# Patient Record
Sex: MALE | Race: WHITE | Employment: OTHER | URBAN - METROPOLITAN AREA
[De-identification: names, ages, dates, MRNs, and addresses within clinical notes are randomized per-mention and may not be internally consistent; named-entity substitution may affect disease eponyms.]

---

## 2018-01-01 ENCOUNTER — CLINICAL DOCUMENTATION (OUTPATIENT)
Dept: ONCOLOGY | Age: 73
End: 2018-01-01

## 2018-01-01 ENCOUNTER — APPOINTMENT (OUTPATIENT)
Dept: MRI IMAGING | Age: 73
DRG: 014 | End: 2018-01-01
Attending: SURGERY
Payer: COMMERCIAL

## 2018-01-01 ENCOUNTER — APPOINTMENT (OUTPATIENT)
Dept: INTERVENTIONAL RADIOLOGY/VASCULAR | Age: 73
DRG: 014 | End: 2018-01-01
Attending: SURGERY
Payer: COMMERCIAL

## 2018-01-01 ENCOUNTER — APPOINTMENT (OUTPATIENT)
Dept: GENERAL RADIOLOGY | Age: 73
DRG: 014 | End: 2018-01-01
Attending: SURGERY
Payer: COMMERCIAL

## 2018-01-01 ENCOUNTER — APPOINTMENT (OUTPATIENT)
Dept: CT IMAGING | Age: 73
DRG: 014 | End: 2018-01-01
Attending: SURGERY
Payer: COMMERCIAL

## 2018-01-01 ENCOUNTER — HOSPITAL ENCOUNTER (OUTPATIENT)
Dept: ONCOLOGY | Age: 73
Setting detail: INFUSION SERIES
Discharge: HOME | End: 2018-08-14
Payer: COMMERCIAL

## 2018-01-01 ENCOUNTER — HOSPITAL ENCOUNTER (OUTPATIENT)
Dept: PULMONOLOGY | Age: 73
Discharge: HOME | End: 2018-08-14
Payer: COMMERCIAL

## 2018-01-01 ENCOUNTER — HOSPITAL ENCOUNTER (OUTPATIENT)
Dept: CT IMAGING | Age: 73
Discharge: HOME | End: 2018-08-14
Payer: COMMERCIAL

## 2018-01-01 ENCOUNTER — APPOINTMENT (OUTPATIENT)
Dept: ULTRASOUND IMAGING | Age: 73
DRG: 014 | End: 2018-01-01
Attending: SURGERY
Payer: COMMERCIAL

## 2018-01-01 ENCOUNTER — HOSPITAL ENCOUNTER (OUTPATIENT)
Dept: NUCLEAR MEDICINE | Age: 73
Discharge: HOME | End: 2018-09-12
Payer: COMMERCIAL

## 2018-01-01 ENCOUNTER — TELEPHONE (OUTPATIENT)
Dept: SURGERY | Age: 73
End: 2018-01-01

## 2018-01-01 ENCOUNTER — HOSPITAL ENCOUNTER (OUTPATIENT)
Dept: NON INVASIVE DIAGNOSTICS | Age: 73
Discharge: HOME | End: 2018-08-14
Payer: COMMERCIAL

## 2018-01-01 ENCOUNTER — HOSPITAL ENCOUNTER (OUTPATIENT)
Dept: ONCOLOGY | Age: 73
Setting detail: INFUSION SERIES
Discharge: HOME | End: 2018-09-03
Payer: COMMERCIAL

## 2018-01-01 ENCOUNTER — HOSPITAL ENCOUNTER (INPATIENT)
Age: 73
LOS: 37 days | DRG: 014 | End: 2018-10-27
Attending: SURGERY | Admitting: INTERNAL MEDICINE
Payer: COMMERCIAL

## 2018-01-01 ENCOUNTER — APPOINTMENT (OUTPATIENT)
Dept: NUCLEAR MEDICINE | Age: 73
DRG: 014 | End: 2018-01-01
Attending: SURGERY
Payer: COMMERCIAL

## 2018-01-01 ENCOUNTER — HOSPITAL ENCOUNTER (OUTPATIENT)
Dept: CT IMAGING | Age: 73
Discharge: HOME | End: 2018-09-12
Payer: COMMERCIAL

## 2018-01-01 ENCOUNTER — ANESTHESIA EVENT (OUTPATIENT)
Dept: OPERATING ROOM | Age: 73
DRG: 014 | End: 2018-01-01
Payer: COMMERCIAL

## 2018-01-01 ENCOUNTER — HOSPITAL ENCOUNTER (OUTPATIENT)
Dept: ONCOLOGY | Age: 73
Setting detail: INFUSION SERIES
Discharge: HOME | End: 2018-08-17
Payer: COMMERCIAL

## 2018-01-01 ENCOUNTER — ANESTHESIA (OUTPATIENT)
Dept: OPERATING ROOM | Age: 73
DRG: 014 | End: 2018-01-01
Payer: COMMERCIAL

## 2018-01-01 ENCOUNTER — HOSPITAL ENCOUNTER (OUTPATIENT)
Dept: GENERAL RADIOLOGY | Age: 73
Discharge: HOME | End: 2018-08-14
Payer: COMMERCIAL

## 2018-01-01 ENCOUNTER — HOSPITAL ENCOUNTER (OUTPATIENT)
Dept: ONCOLOGY | Age: 73
Setting detail: INFUSION SERIES
Discharge: HOME | End: 2018-09-19
Payer: COMMERCIAL

## 2018-01-01 ENCOUNTER — HOSPITAL ENCOUNTER (OUTPATIENT)
Dept: ONCOLOGY | Age: 73
Setting detail: INFUSION SERIES
Discharge: HOME | End: 2018-08-21
Payer: COMMERCIAL

## 2018-01-01 VITALS
WEIGHT: 272.71 LBS | RESPIRATION RATE: 22 BRPM | SYSTOLIC BLOOD PRESSURE: 97 MMHG | TEMPERATURE: 95.4 F | OXYGEN SATURATION: 87 % | BODY MASS INDEX: 40.39 KG/M2 | HEART RATE: 79 BPM | DIASTOLIC BLOOD PRESSURE: 65 MMHG | HEIGHT: 69 IN

## 2018-01-01 VITALS
SYSTOLIC BLOOD PRESSURE: 127 MMHG | OXYGEN SATURATION: 98 % | DIASTOLIC BLOOD PRESSURE: 62 MMHG | RESPIRATION RATE: 18 BRPM

## 2018-01-01 VITALS
SYSTOLIC BLOOD PRESSURE: 139 MMHG | BODY MASS INDEX: 39.51 KG/M2 | TEMPERATURE: 97 F | WEIGHT: 266.76 LBS | HEIGHT: 69 IN | HEART RATE: 58 BPM | RESPIRATION RATE: 18 BRPM | DIASTOLIC BLOOD PRESSURE: 77 MMHG

## 2018-01-01 DIAGNOSIS — C92.00 ACUTE MYELOID LEUKEMIA NOT HAVING ACHIEVED REMISSION (HCC): ICD-10-CM

## 2018-01-01 DIAGNOSIS — T81.718A IATROGENIC PULMONARY EMBOLISM AND INFARCTION, INITIAL ENCOUNTER (HCC): ICD-10-CM

## 2018-01-01 DIAGNOSIS — G89.18 POST-OP PAIN: Primary | ICD-10-CM

## 2018-01-01 DIAGNOSIS — I26.99 IATROGENIC PULMONARY EMBOLISM AND INFARCTION, INITIAL ENCOUNTER (HCC): ICD-10-CM

## 2018-01-01 DIAGNOSIS — Z01.818 PREOP EXAMINATION: ICD-10-CM

## 2018-01-01 LAB
(1,3)-BETA-D-GLUCAN (FUNGITELL) INTERPRETATION: ABNORMAL
(1,3)-BETA-D-GLUCAN (FUNGITELL) INTERPRETATION: NEGATIVE
(1,3)-BETA-D-GLUCAN (FUNGITELL): 59 PG/ML
(1,3)-BETA-D-GLUCAN (FUNGITELL): 61 PG/ML
A/G RATIO: 1.2 (ref 1.1–2.2)
A/G RATIO: 1.4 (ref 1.1–2.2)
A/G RATIO: 1.9 (ref 1.1–2.2)
ABO/RH: NORMAL
ADAMTS13 ACTIVITY: 16 %
ALBUMIN SERPL-MCNC: 2.1 G/DL (ref 3.4–5)
ALBUMIN SERPL-MCNC: 2.1 G/DL (ref 3.4–5)
ALBUMIN SERPL-MCNC: 2.2 G/DL (ref 3.4–5)
ALBUMIN SERPL-MCNC: 2.2 G/DL (ref 3.4–5)
ALBUMIN SERPL-MCNC: 2.3 G/DL (ref 3.4–5)
ALBUMIN SERPL-MCNC: 2.3 G/DL (ref 3.4–5)
ALBUMIN SERPL-MCNC: 2.4 G/DL (ref 3.4–5)
ALBUMIN SERPL-MCNC: 2.4 G/DL (ref 3.4–5)
ALBUMIN SERPL-MCNC: 2.5 G/DL (ref 3.4–5)
ALBUMIN SERPL-MCNC: 2.6 G/DL (ref 3.4–5)
ALBUMIN SERPL-MCNC: 2.7 G/DL (ref 3.4–5)
ALBUMIN SERPL-MCNC: 2.8 G/DL (ref 3.4–5)
ALBUMIN SERPL-MCNC: 2.9 G/DL (ref 3.4–5)
ALBUMIN SERPL-MCNC: 3 G/DL (ref 3.4–5)
ALBUMIN SERPL-MCNC: 3.1 G/DL (ref 3.4–5)
ALBUMIN SERPL-MCNC: 3.2 G/DL (ref 3.4–5)
ALBUMIN SERPL-MCNC: 3.3 G/DL (ref 3.4–5)
ALBUMIN SERPL-MCNC: 3.3 G/DL (ref 3.4–5)
ALBUMIN SERPL-MCNC: 3.4 G/DL (ref 3.4–5)
ALBUMIN SERPL-MCNC: 4 G/DL (ref 3.4–5)
ALBUMIN SERPL-MCNC: 4.2 G/DL (ref 3.4–5)
ALP BLD-CCNC: 100 U/L (ref 40–129)
ALP BLD-CCNC: 100 U/L (ref 40–129)
ALP BLD-CCNC: 102 U/L (ref 40–129)
ALP BLD-CCNC: 108 U/L (ref 40–129)
ALP BLD-CCNC: 112 U/L (ref 40–129)
ALP BLD-CCNC: 116 U/L (ref 40–129)
ALP BLD-CCNC: 121 U/L (ref 40–129)
ALP BLD-CCNC: 125 U/L (ref 40–129)
ALP BLD-CCNC: 132 U/L (ref 40–129)
ALP BLD-CCNC: 137 U/L (ref 40–129)
ALP BLD-CCNC: 143 U/L (ref 40–129)
ALP BLD-CCNC: 150 U/L (ref 40–129)
ALP BLD-CCNC: 56 U/L (ref 40–129)
ALP BLD-CCNC: 57 U/L (ref 40–129)
ALP BLD-CCNC: 58 U/L (ref 40–129)
ALP BLD-CCNC: 61 U/L (ref 40–129)
ALP BLD-CCNC: 61 U/L (ref 40–129)
ALP BLD-CCNC: 64 U/L (ref 40–129)
ALP BLD-CCNC: 64 U/L (ref 40–129)
ALP BLD-CCNC: 68 U/L (ref 40–129)
ALP BLD-CCNC: 74 U/L (ref 40–129)
ALP BLD-CCNC: 75 U/L (ref 40–129)
ALP BLD-CCNC: 76 U/L (ref 40–129)
ALP BLD-CCNC: 77 U/L (ref 40–129)
ALP BLD-CCNC: 80 U/L (ref 40–129)
ALP BLD-CCNC: 83 U/L (ref 40–129)
ALP BLD-CCNC: 85 U/L (ref 40–129)
ALP BLD-CCNC: 88 U/L (ref 40–129)
ALP BLD-CCNC: 89 U/L (ref 40–129)
ALP BLD-CCNC: 90 U/L (ref 40–129)
ALP BLD-CCNC: 91 U/L (ref 40–129)
ALP BLD-CCNC: 94 U/L (ref 40–129)
ALP BLD-CCNC: 96 U/L (ref 40–129)
ALP BLD-CCNC: 97 U/L (ref 40–129)
ALP BLD-CCNC: 98 U/L (ref 40–129)
ALP BLD-CCNC: 98 U/L (ref 40–129)
ALT SERPL-CCNC: 106 U/L (ref 10–40)
ALT SERPL-CCNC: 11 U/L (ref 10–40)
ALT SERPL-CCNC: 110 U/L (ref 10–40)
ALT SERPL-CCNC: 12 U/L (ref 10–40)
ALT SERPL-CCNC: 134 U/L (ref 10–40)
ALT SERPL-CCNC: 15 U/L (ref 10–40)
ALT SERPL-CCNC: 15 U/L (ref 10–40)
ALT SERPL-CCNC: 16 U/L (ref 10–40)
ALT SERPL-CCNC: 16 U/L (ref 10–40)
ALT SERPL-CCNC: 167 U/L (ref 10–40)
ALT SERPL-CCNC: 17 U/L (ref 10–40)
ALT SERPL-CCNC: 18 U/L (ref 10–40)
ALT SERPL-CCNC: 18 U/L (ref 10–40)
ALT SERPL-CCNC: 2008 U/L (ref 10–40)
ALT SERPL-CCNC: 205 U/L (ref 10–40)
ALT SERPL-CCNC: 21 U/L (ref 10–40)
ALT SERPL-CCNC: 210 U/L (ref 10–40)
ALT SERPL-CCNC: 211 U/L (ref 10–40)
ALT SERPL-CCNC: 23 U/L (ref 10–40)
ALT SERPL-CCNC: 25 U/L (ref 10–40)
ALT SERPL-CCNC: 26 U/L (ref 10–40)
ALT SERPL-CCNC: 27 U/L (ref 10–40)
ALT SERPL-CCNC: 27 U/L (ref 10–40)
ALT SERPL-CCNC: 30 U/L (ref 10–40)
ALT SERPL-CCNC: 30 U/L (ref 10–40)
ALT SERPL-CCNC: 36 U/L (ref 10–40)
ALT SERPL-CCNC: 36 U/L (ref 10–40)
ALT SERPL-CCNC: 40 U/L (ref 10–40)
ALT SERPL-CCNC: 40 U/L (ref 10–40)
ALT SERPL-CCNC: 47 U/L (ref 10–40)
ALT SERPL-CCNC: 48 U/L (ref 10–40)
ALT SERPL-CCNC: 49 U/L (ref 10–40)
ALT SERPL-CCNC: 54 U/L (ref 10–40)
ALT SERPL-CCNC: 648 U/L (ref 10–40)
ALT SERPL-CCNC: 68 U/L (ref 10–40)
ALT SERPL-CCNC: 77 U/L (ref 10–40)
ALT SERPL-CCNC: 96 U/L (ref 10–40)
AMMONIA: 16 UMOL/L (ref 16–60)
AMMONIA: <10 UMOL/L (ref 16–60)
AMORPHOUS: ABNORMAL /HPF
ANION GAP SERPL CALCULATED.3IONS-SCNC: 10 MMOL/L (ref 3–16)
ANION GAP SERPL CALCULATED.3IONS-SCNC: 11 MMOL/L (ref 3–16)
ANION GAP SERPL CALCULATED.3IONS-SCNC: 12 MMOL/L (ref 3–16)
ANION GAP SERPL CALCULATED.3IONS-SCNC: 13 MMOL/L (ref 3–16)
ANION GAP SERPL CALCULATED.3IONS-SCNC: 14 MMOL/L (ref 3–16)
ANION GAP SERPL CALCULATED.3IONS-SCNC: 15 MMOL/L (ref 3–16)
ANION GAP SERPL CALCULATED.3IONS-SCNC: 16 MMOL/L (ref 3–16)
ANION GAP SERPL CALCULATED.3IONS-SCNC: 17 MMOL/L (ref 3–16)
ANION GAP SERPL CALCULATED.3IONS-SCNC: 18 MMOL/L (ref 3–16)
ANION GAP SERPL CALCULATED.3IONS-SCNC: 19 MMOL/L (ref 3–16)
ANION GAP SERPL CALCULATED.3IONS-SCNC: 21 MMOL/L (ref 3–16)
ANION GAP SERPL CALCULATED.3IONS-SCNC: 25 MMOL/L (ref 3–16)
ANION GAP SERPL CALCULATED.3IONS-SCNC: 25 MMOL/L (ref 3–16)
ANION GAP SERPL CALCULATED.3IONS-SCNC: 26 MMOL/L (ref 3–16)
ANION GAP SERPL CALCULATED.3IONS-SCNC: 8 MMOL/L (ref 3–16)
ANION GAP SERPL CALCULATED.3IONS-SCNC: 9 MMOL/L (ref 3–16)
ANISOCYTOSIS: ABNORMAL
ANISOCYTOSIS: ABNORMAL
ANTIBODY SCREEN: NORMAL
APPEARANCE BAL (LAVAGE): ABNORMAL
APPEARANCE BAL (LAVAGE): ABNORMAL
APTT: 20 SEC (ref 26–36)
APTT: 27.9 SEC (ref 26–36)
APTT: 29.4 SEC (ref 26–36)
APTT: 29.7 SEC (ref 26–36)
APTT: 29.8 SEC (ref 26–36)
APTT: 30.9 SEC (ref 26–36)
APTT: 31.4 SEC (ref 26–36)
APTT: 31.4 SEC (ref 26–36)
APTT: 34.3 SEC (ref 26–36)
APTT: 34.4 SEC (ref 26–36)
APTT: 36.3 SEC (ref 26–36)
APTT: 36.5 SEC (ref 26–36)
APTT: 37.1 SEC (ref 26–36)
APTT: 37.5 SEC (ref 26–36)
APTT: 38.3 SEC (ref 26–36)
ASPERGILLUS GALACTO AG: NEGATIVE
ASPERGILLUS GALACTO INDEX: 0.03
AST SERPL-CCNC: 111 U/L (ref 15–37)
AST SERPL-CCNC: 12 U/L (ref 15–37)
AST SERPL-CCNC: 12 U/L (ref 15–37)
AST SERPL-CCNC: 13 U/L (ref 15–37)
AST SERPL-CCNC: 15 U/L (ref 15–37)
AST SERPL-CCNC: 15 U/L (ref 15–37)
AST SERPL-CCNC: 152 U/L (ref 15–37)
AST SERPL-CCNC: 16 U/L (ref 15–37)
AST SERPL-CCNC: 17 U/L (ref 15–37)
AST SERPL-CCNC: 1747 U/L (ref 15–37)
AST SERPL-CCNC: 18 U/L (ref 15–37)
AST SERPL-CCNC: 20 U/L (ref 15–37)
AST SERPL-CCNC: 208 U/L (ref 15–37)
AST SERPL-CCNC: 21 U/L (ref 15–37)
AST SERPL-CCNC: 24 U/L (ref 15–37)
AST SERPL-CCNC: 25 U/L (ref 15–37)
AST SERPL-CCNC: 25 U/L (ref 15–37)
AST SERPL-CCNC: 27 U/L (ref 15–37)
AST SERPL-CCNC: 30 U/L (ref 15–37)
AST SERPL-CCNC: 31 U/L (ref 15–37)
AST SERPL-CCNC: 31 U/L (ref 15–37)
AST SERPL-CCNC: 33 U/L (ref 15–37)
AST SERPL-CCNC: 34 U/L (ref 15–37)
AST SERPL-CCNC: 38 U/L (ref 15–37)
AST SERPL-CCNC: 41 U/L (ref 15–37)
AST SERPL-CCNC: 44 U/L (ref 15–37)
AST SERPL-CCNC: 50 U/L (ref 15–37)
AST SERPL-CCNC: 51 U/L (ref 15–37)
AST SERPL-CCNC: 52 U/L (ref 15–37)
AST SERPL-CCNC: 5581 U/L (ref 15–37)
AST SERPL-CCNC: 58 U/L (ref 15–37)
AST SERPL-CCNC: 60 U/L (ref 15–37)
AST SERPL-CCNC: 69 U/L (ref 15–37)
AST SERPL-CCNC: 76 U/L (ref 15–37)
AST SERPL-CCNC: 85 U/L (ref 15–37)
ATYPICAL LYMPHOCYTE RELATIVE PERCENT: 1 % (ref 0–6)
BACTERIA: ABNORMAL /HPF
BANDED NEUTROPHILS RELATIVE PERCENT: 2 % (ref 0–7)
BANDED NEUTROPHILS RELATIVE PERCENT: 3 % (ref 0–7)
BASE EXCESS ARTERIAL: -10 (ref -3–3)
BASE EXCESS ARTERIAL: -10 (ref -3–3)
BASE EXCESS ARTERIAL: -8 (ref -3–3)
BASE EXCESS ARTERIAL: 4 (ref -3–3)
BASE EXCESS ARTERIAL: 6 (ref -3–3)
BASOPHILS ABSOLUTE: 0 K/UL (ref 0–0.2)
BASOPHILS ABSOLUTE: 0.1 K/UL (ref 0–0.2)
BASOPHILS ABSOLUTE: 0.4 K/UL (ref 0–0.2)
BASOPHILS RELATIVE PERCENT: 0 %
BASOPHILS RELATIVE PERCENT: 0.1 %
BASOPHILS RELATIVE PERCENT: 0.1 %
BASOPHILS RELATIVE PERCENT: 0.3 %
BASOPHILS RELATIVE PERCENT: 0.5 %
BASOPHILS RELATIVE PERCENT: 0.5 %
BASOPHILS RELATIVE PERCENT: 0.6 %
BASOPHILS RELATIVE PERCENT: 0.6 %
BASOPHILS RELATIVE PERCENT: 1.3 %
BASOPHILS RELATIVE PERCENT: 1.5 %
BASOPHILS RELATIVE PERCENT: 1.5 %
BASOPHILS RELATIVE PERCENT: 3 %
BILIRUB SERPL-MCNC: 0.3 MG/DL (ref 0–1)
BILIRUB SERPL-MCNC: 0.3 MG/DL (ref 0–1)
BILIRUB SERPL-MCNC: 0.4 MG/DL (ref 0–1)
BILIRUB SERPL-MCNC: 0.5 MG/DL (ref 0–1)
BILIRUB SERPL-MCNC: 0.5 MG/DL (ref 0–1)
BILIRUB SERPL-MCNC: 0.6 MG/DL (ref 0–1)
BILIRUB SERPL-MCNC: 0.6 MG/DL (ref 0–1)
BILIRUB SERPL-MCNC: 0.7 MG/DL (ref 0–1)
BILIRUB SERPL-MCNC: 0.7 MG/DL (ref 0–1)
BILIRUB SERPL-MCNC: 0.9 MG/DL (ref 0–1)
BILIRUB SERPL-MCNC: 1 MG/DL (ref 0–1)
BILIRUB SERPL-MCNC: 1.2 MG/DL (ref 0–1)
BILIRUB SERPL-MCNC: 1.3 MG/DL (ref 0–1)
BILIRUB SERPL-MCNC: 1.3 MG/DL (ref 0–1)
BILIRUB SERPL-MCNC: 1.4 MG/DL (ref 0–1)
BILIRUB SERPL-MCNC: 1.4 MG/DL (ref 0–1)
BILIRUB SERPL-MCNC: 1.5 MG/DL (ref 0–1)
BILIRUB SERPL-MCNC: 1.6 MG/DL (ref 0–1)
BILIRUB SERPL-MCNC: 1.8 MG/DL (ref 0–1)
BILIRUB SERPL-MCNC: 2 MG/DL (ref 0–1)
BILIRUB SERPL-MCNC: 2.2 MG/DL (ref 0–1)
BILIRUB SERPL-MCNC: 2.2 MG/DL (ref 0–1)
BILIRUB SERPL-MCNC: 2.3 MG/DL (ref 0–1)
BILIRUB SERPL-MCNC: 2.4 MG/DL (ref 0–1)
BILIRUB SERPL-MCNC: 2.5 MG/DL (ref 0–1)
BILIRUB SERPL-MCNC: 2.5 MG/DL (ref 0–1)
BILIRUB SERPL-MCNC: 2.6 MG/DL (ref 0–1)
BILIRUB SERPL-MCNC: 2.6 MG/DL (ref 0–1)
BILIRUB SERPL-MCNC: 2.7 MG/DL (ref 0–1)
BILIRUB SERPL-MCNC: 3.2 MG/DL (ref 0–1)
BILIRUB SERPL-MCNC: 3.7 MG/DL (ref 0–1)
BILIRUB SERPL-MCNC: 4 MG/DL (ref 0–1)
BILIRUBIN DIRECT: 0.3 MG/DL (ref 0–0.3)
BILIRUBIN DIRECT: 0.3 MG/DL (ref 0–0.3)
BILIRUBIN DIRECT: 0.5 MG/DL (ref 0–0.3)
BILIRUBIN DIRECT: 0.6 MG/DL (ref 0–0.3)
BILIRUBIN DIRECT: 0.6 MG/DL (ref 0–0.3)
BILIRUBIN DIRECT: 0.7 MG/DL (ref 0–0.3)
BILIRUBIN DIRECT: 0.7 MG/DL (ref 0–0.3)
BILIRUBIN DIRECT: 0.8 MG/DL (ref 0–0.3)
BILIRUBIN DIRECT: 0.9 MG/DL (ref 0–0.3)
BILIRUBIN DIRECT: 0.9 MG/DL (ref 0–0.3)
BILIRUBIN DIRECT: 1.1 MG/DL (ref 0–0.3)
BILIRUBIN DIRECT: 1.2 MG/DL (ref 0–0.3)
BILIRUBIN DIRECT: 1.3 MG/DL (ref 0–0.3)
BILIRUBIN DIRECT: 1.3 MG/DL (ref 0–0.3)
BILIRUBIN DIRECT: 1.4 MG/DL (ref 0–0.3)
BILIRUBIN DIRECT: 1.4 MG/DL (ref 0–0.3)
BILIRUBIN DIRECT: 1.5 MG/DL (ref 0–0.3)
BILIRUBIN DIRECT: 1.6 MG/DL (ref 0–0.3)
BILIRUBIN DIRECT: 1.6 MG/DL (ref 0–0.3)
BILIRUBIN DIRECT: 1.8 MG/DL (ref 0–0.3)
BILIRUBIN DIRECT: 1.9 MG/DL (ref 0–0.3)
BILIRUBIN DIRECT: 2.9 MG/DL (ref 0–0.3)
BILIRUBIN DIRECT: 3.1 MG/DL (ref 0–0.3)
BILIRUBIN DIRECT: <0.2 MG/DL (ref 0–0.3)
BILIRUBIN URINE: NEGATIVE
BILIRUBIN, INDIRECT: 0.2 MG/DL (ref 0–1)
BILIRUBIN, INDIRECT: 0.3 MG/DL (ref 0–1)
BILIRUBIN, INDIRECT: 0.4 MG/DL (ref 0–1)
BILIRUBIN, INDIRECT: 0.5 MG/DL (ref 0–1)
BILIRUBIN, INDIRECT: 0.6 MG/DL (ref 0–1)
BILIRUBIN, INDIRECT: 0.7 MG/DL (ref 0–1)
BILIRUBIN, INDIRECT: 0.8 MG/DL (ref 0–1)
BILIRUBIN, INDIRECT: 0.9 MG/DL (ref 0–1)
BILIRUBIN, INDIRECT: 1.1 MG/DL (ref 0–1)
BILIRUBIN, INDIRECT: 1.3 MG/DL (ref 0–1)
BILIRUBIN, INDIRECT: ABNORMAL MG/DL (ref 0–1)
BILIRUBIN, INDIRECT: NORMAL MG/DL (ref 0–1)
BK VIRUS DAN, QN PCR: NOT DETECTED
BK VIRUS LOG COPY: <2.6 LOG CPY/ML
BK VIRUS SOURCE: NORMAL
BK VIRUS, BLOOD, COPIES/ML: <390 CPY/ML
BLOOD BANK DISPENSE STATUS: NORMAL
BLOOD BANK PRODUCT CODE: NORMAL
BLOOD CULTURE, ROUTINE: ABNORMAL
BLOOD CULTURE, ROUTINE: NORMAL
BLOOD SMEAR REVIEW: NORMAL
BLOOD, URINE: ABNORMAL
BLOOD, URINE: NEGATIVE
BPU ID: NORMAL
BUN BLDV-MCNC: 105 MG/DL (ref 7–20)
BUN BLDV-MCNC: 108 MG/DL (ref 7–20)
BUN BLDV-MCNC: 110 MG/DL (ref 7–20)
BUN BLDV-MCNC: 110 MG/DL (ref 7–20)
BUN BLDV-MCNC: 14 MG/DL (ref 7–20)
BUN BLDV-MCNC: 14 MG/DL (ref 7–20)
BUN BLDV-MCNC: 15 MG/DL (ref 7–20)
BUN BLDV-MCNC: 15 MG/DL (ref 7–20)
BUN BLDV-MCNC: 16 MG/DL (ref 7–20)
BUN BLDV-MCNC: 17 MG/DL (ref 7–20)
BUN BLDV-MCNC: 18 MG/DL (ref 7–20)
BUN BLDV-MCNC: 19 MG/DL (ref 7–20)
BUN BLDV-MCNC: 20 MG/DL (ref 7–20)
BUN BLDV-MCNC: 21 MG/DL (ref 7–20)
BUN BLDV-MCNC: 22 MG/DL (ref 7–20)
BUN BLDV-MCNC: 22 MG/DL (ref 7–20)
BUN BLDV-MCNC: 23 MG/DL (ref 7–20)
BUN BLDV-MCNC: 25 MG/DL (ref 7–20)
BUN BLDV-MCNC: 25 MG/DL (ref 7–20)
BUN BLDV-MCNC: 26 MG/DL (ref 7–20)
BUN BLDV-MCNC: 28 MG/DL (ref 7–20)
BUN BLDV-MCNC: 30 MG/DL (ref 7–20)
BUN BLDV-MCNC: 32 MG/DL (ref 7–20)
BUN BLDV-MCNC: 32 MG/DL (ref 7–20)
BUN BLDV-MCNC: 34 MG/DL (ref 7–20)
BUN BLDV-MCNC: 34 MG/DL (ref 7–20)
BUN BLDV-MCNC: 38 MG/DL (ref 7–20)
BUN BLDV-MCNC: 38 MG/DL (ref 7–20)
BUN BLDV-MCNC: 41 MG/DL (ref 7–20)
BUN BLDV-MCNC: 42 MG/DL (ref 7–20)
BUN BLDV-MCNC: 42 MG/DL (ref 7–20)
BUN BLDV-MCNC: 50 MG/DL (ref 7–20)
BUN BLDV-MCNC: 51 MG/DL (ref 7–20)
BUN BLDV-MCNC: 52 MG/DL (ref 7–20)
BUN BLDV-MCNC: 53 MG/DL (ref 7–20)
BUN BLDV-MCNC: 53 MG/DL (ref 7–20)
BUN BLDV-MCNC: 56 MG/DL (ref 7–20)
BUN BLDV-MCNC: 60 MG/DL (ref 7–20)
BUN BLDV-MCNC: 65 MG/DL (ref 7–20)
BUN BLDV-MCNC: 66 MG/DL (ref 7–20)
BUN BLDV-MCNC: 67 MG/DL (ref 7–20)
BUN BLDV-MCNC: 68 MG/DL (ref 7–20)
BUN BLDV-MCNC: 68 MG/DL (ref 7–20)
BUN BLDV-MCNC: 69 MG/DL (ref 7–20)
BUN BLDV-MCNC: 70 MG/DL (ref 7–20)
BUN BLDV-MCNC: 73 MG/DL (ref 7–20)
BUN BLDV-MCNC: 73 MG/DL (ref 7–20)
BUN BLDV-MCNC: 75 MG/DL (ref 7–20)
BUN BLDV-MCNC: 78 MG/DL (ref 7–20)
BUN BLDV-MCNC: 78 MG/DL (ref 7–20)
BUN BLDV-MCNC: 79 MG/DL (ref 7–20)
BUN BLDV-MCNC: 82 MG/DL (ref 7–20)
BUN BLDV-MCNC: 89 MG/DL (ref 7–20)
BUN BLDV-MCNC: 90 MG/DL (ref 7–20)
BUN BLDV-MCNC: 90 MG/DL (ref 7–20)
BUN BLDV-MCNC: 92 MG/DL (ref 7–20)
BUN BLDV-MCNC: 96 MG/DL (ref 7–20)
C DIFFICILE TOXIN, EIA: NORMAL
CALCIUM IONIZED: 1 MMOL/L (ref 1.12–1.32)
CALCIUM IONIZED: 1.14 MMOL/L (ref 1.12–1.32)
CALCIUM IONIZED: 1.19 MMOL/L (ref 1.12–1.32)
CALCIUM SERPL-MCNC: 6.2 MG/DL (ref 8.3–10.6)
CALCIUM SERPL-MCNC: 7 MG/DL (ref 8.3–10.6)
CALCIUM SERPL-MCNC: 7.1 MG/DL (ref 8.3–10.6)
CALCIUM SERPL-MCNC: 7.1 MG/DL (ref 8.3–10.6)
CALCIUM SERPL-MCNC: 7.3 MG/DL (ref 8.3–10.6)
CALCIUM SERPL-MCNC: 7.3 MG/DL (ref 8.3–10.6)
CALCIUM SERPL-MCNC: 7.4 MG/DL (ref 8.3–10.6)
CALCIUM SERPL-MCNC: 7.4 MG/DL (ref 8.3–10.6)
CALCIUM SERPL-MCNC: 7.6 MG/DL (ref 8.3–10.6)
CALCIUM SERPL-MCNC: 7.7 MG/DL (ref 8.3–10.6)
CALCIUM SERPL-MCNC: 7.8 MG/DL (ref 8.3–10.6)
CALCIUM SERPL-MCNC: 7.8 MG/DL (ref 8.3–10.6)
CALCIUM SERPL-MCNC: 7.9 MG/DL (ref 8.3–10.6)
CALCIUM SERPL-MCNC: 8 MG/DL (ref 8.3–10.6)
CALCIUM SERPL-MCNC: 8.1 MG/DL (ref 8.3–10.6)
CALCIUM SERPL-MCNC: 8.2 MG/DL (ref 8.3–10.6)
CALCIUM SERPL-MCNC: 8.3 MG/DL (ref 8.3–10.6)
CALCIUM SERPL-MCNC: 8.4 MG/DL (ref 8.3–10.6)
CALCIUM SERPL-MCNC: 8.5 MG/DL (ref 8.3–10.6)
CALCIUM SERPL-MCNC: 8.6 MG/DL (ref 8.3–10.6)
CALCIUM SERPL-MCNC: 8.7 MG/DL (ref 8.3–10.6)
CALCIUM SERPL-MCNC: 8.9 MG/DL (ref 8.3–10.6)
CALCIUM SERPL-MCNC: 9.1 MG/DL (ref 8.3–10.6)
CALCIUM SERPL-MCNC: 9.3 MG/DL (ref 8.3–10.6)
CALCIUM SERPL-MCNC: 9.5 MG/DL (ref 8.3–10.6)
CASTS UA: ABNORMAL /LPF
CASTS: ABNORMAL /LPF
CHLORIDE BLD-SCNC: 100 MMOL/L (ref 99–110)
CHLORIDE BLD-SCNC: 101 MMOL/L (ref 99–110)
CHLORIDE BLD-SCNC: 102 MMOL/L (ref 99–110)
CHLORIDE BLD-SCNC: 103 MMOL/L (ref 99–110)
CHLORIDE BLD-SCNC: 104 MMOL/L (ref 99–110)
CHLORIDE BLD-SCNC: 105 MMOL/L (ref 99–110)
CHLORIDE BLD-SCNC: 106 MMOL/L (ref 99–110)
CHLORIDE BLD-SCNC: 107 MMOL/L (ref 99–110)
CHLORIDE BLD-SCNC: 108 MMOL/L (ref 99–110)
CHLORIDE BLD-SCNC: 109 MMOL/L (ref 99–110)
CHLORIDE BLD-SCNC: 109 MMOL/L (ref 99–110)
CHLORIDE BLD-SCNC: 110 MMOL/L (ref 99–110)
CHLORIDE BLD-SCNC: 111 MMOL/L (ref 99–110)
CHLORIDE BLD-SCNC: 112 MMOL/L (ref 99–110)
CHLORIDE BLD-SCNC: 114 MMOL/L (ref 99–110)
CHLORIDE BLD-SCNC: 97 MMOL/L (ref 99–110)
CHLORIDE BLD-SCNC: 98 MMOL/L (ref 99–110)
CLARITY: ABNORMAL
CLARITY: ABNORMAL
CLARITY: CLEAR
CLOT EVALUATION BAL: ABNORMAL
CLOT EVALUATION BAL: ABNORMAL
CMV DNA QNT PCR: <2.6 LOG CPY/ML
CMV DNA QUANTATATIVE INTERPRETATION: <2.4 LOG IU/ML
CMV DNA QUANTATATIVE INTERPRETATION: NOT DETECTED
CMV DNA QUANTITATIVE: <227 IU/ML
CMV SOURCE: NORMAL
CMVQ COPY/ML: <390 CPY/ML
CO2: 10 MMOL/L (ref 21–32)
CO2: 16 MMOL/L (ref 21–32)
CO2: 18 MMOL/L (ref 21–32)
CO2: 19 MMOL/L (ref 21–32)
CO2: 19 MMOL/L (ref 21–32)
CO2: 20 MMOL/L (ref 21–32)
CO2: 21 MMOL/L (ref 21–32)
CO2: 22 MMOL/L (ref 21–32)
CO2: 23 MMOL/L (ref 21–32)
CO2: 24 MMOL/L (ref 21–32)
CO2: 25 MMOL/L (ref 21–32)
CO2: 26 MMOL/L (ref 21–32)
CO2: 27 MMOL/L (ref 21–32)
COLOR LAVAGE: COLORLESS
COLOR LAVAGE: COLORLESS
COLOR: YELLOW
CREAT SERPL-MCNC: 0.5 MG/DL (ref 0.8–1.3)
CREAT SERPL-MCNC: 0.5 MG/DL (ref 0.8–1.3)
CREAT SERPL-MCNC: 0.6 MG/DL (ref 0.8–1.3)
CREAT SERPL-MCNC: 0.7 MG/DL (ref 0.8–1.3)
CREAT SERPL-MCNC: 0.8 MG/DL (ref 0.8–1.3)
CREAT SERPL-MCNC: 0.9 MG/DL (ref 0.8–1.3)
CREAT SERPL-MCNC: 1 MG/DL (ref 0.8–1.3)
CREAT SERPL-MCNC: 1 MG/DL (ref 0.8–1.3)
CREAT SERPL-MCNC: 1.1 MG/DL (ref 0.8–1.3)
CREAT SERPL-MCNC: 1.2 MG/DL (ref 0.8–1.3)
CREAT SERPL-MCNC: 1.3 MG/DL (ref 0.8–1.3)
CREAT SERPL-MCNC: 1.4 MG/DL (ref 0.8–1.3)
CREAT SERPL-MCNC: 1.5 MG/DL (ref 0.8–1.3)
CREAT SERPL-MCNC: 1.6 MG/DL (ref 0.8–1.3)
CREAT SERPL-MCNC: 1.7 MG/DL (ref 0.8–1.3)
CREAT SERPL-MCNC: 1.8 MG/DL (ref 0.8–1.3)
CREAT SERPL-MCNC: 1.9 MG/DL (ref 0.8–1.3)
CREAT SERPL-MCNC: 2.4 MG/DL (ref 0.8–1.3)
CREAT SERPL-MCNC: 2.5 MG/DL (ref 0.8–1.3)
CREAT SERPL-MCNC: <0.5 MG/DL (ref 0.8–1.3)
CREATININE URINE: 22.4 MG/DL (ref 39–259)
CULTURE, BLOOD 2: NORMAL
CULTURE, BLOOD 2: NORMAL
CULTURE, RESPIRATORY: NORMAL
CULTURE, RESPIRATORY: NORMAL
CYTOMEGALOVIRUS IGG ANTIBODY: <0.2 U/ML
CYTOMEGALOVIRUS IGM ANTIBODY: <8 AU/ML
D DIMER: 1188 NG/ML DDU (ref 0–229)
D DIMER: 1273 NG/ML DDU (ref 0–229)
D DIMER: 1984 NG/ML DDU (ref 0–229)
D DIMER: 2086 NG/ML DDU (ref 0–229)
D DIMER: 2686 NG/ML DDU (ref 0–229)
D DIMER: 2895 NG/ML DDU (ref 0–229)
D DIMER: 3255 NG/ML DDU (ref 0–229)
D DIMER: 911 NG/ML DDU (ref 0–229)
D DIMER: 921 NG/ML DDU (ref 0–229)
DAT POLYSPECIFIC: NORMAL
DESCRIPTION BLOOD BANK: NORMAL
EBV, QUANT. COPY/ML: <390 CPY/ML
EBV, QUANT. INTERP: NOT DETECTED
EBV, QUANT. LOG: <2.6 LOG
EBV, QUANT. SOURCE: NORMAL
EER HCV RNA QNT PCR: NORMAL
EKG ATRIAL RATE: 136 BPM
EKG ATRIAL RATE: 59 BPM
EKG DIAGNOSIS: NORMAL
EKG DIAGNOSIS: NORMAL
EKG P AXIS: 58 DEGREES
EKG P-R INTERVAL: 208 MS
EKG Q-T INTERVAL: 366 MS
EKG Q-T INTERVAL: 438 MS
EKG QRS DURATION: 112 MS
EKG QRS DURATION: 94 MS
EKG QTC CALCULATION (BAZETT): 433 MS
EKG QTC CALCULATION (BAZETT): 492 MS
EKG R AXIS: 35 DEGREES
EKG R AXIS: 44 DEGREES
EKG T AXIS: 48 DEGREES
EKG T AXIS: 52 DEGREES
EKG VENTRICULAR RATE: 109 BPM
EKG VENTRICULAR RATE: 59 BPM
EOSINOPHILS ABSOLUTE: 0 K/UL (ref 0–0.6)
EOSINOPHILS ABSOLUTE: 0.1 K/UL (ref 0–0.6)
EOSINOPHILS RELATIVE PERCENT: 0 %
EOSINOPHILS RELATIVE PERCENT: 0.1 %
EOSINOPHILS RELATIVE PERCENT: 0.2 %
EOSINOPHILS RELATIVE PERCENT: 0.4 %
EOSINOPHILS RELATIVE PERCENT: 0.4 %
EOSINOPHILS RELATIVE PERCENT: 0.8 %
EOSINOPHILS RELATIVE PERCENT: 0.9 %
EOSINOPHILS RELATIVE PERCENT: 1.3 %
EPITHELIAL CELLS, UA: ABNORMAL /HPF
EPSTEIN-BARR VCA IGG: 162 U/ML (ref 0–21.9)
EPSTEIN-BARR VCA IGM: <10 U/ML (ref 0–43.9)
FERRITIN: 901.1 NG/ML (ref 30–400)
FIBRINOGEN: 178 MG/DL (ref 200–397)
FIBRINOGEN: 207 MG/DL (ref 200–397)
FIBRINOGEN: 212 MG/DL (ref 200–397)
FIBRINOGEN: 214 MG/DL (ref 200–397)
FIBRINOGEN: 229 MG/DL (ref 200–397)
FIBRINOGEN: 238 MG/DL (ref 200–397)
FIBRINOGEN: 248 MG/DL (ref 200–397)
FIBRINOGEN: 248 MG/DL (ref 200–397)
FIBRINOGEN: 264 MG/DL (ref 200–397)
FIBRINOGEN: 269 MG/DL (ref 200–397)
FINAL REPORT: NORMAL
GFR AFRICAN AMERICAN: 31
GFR AFRICAN AMERICAN: 32
GFR AFRICAN AMERICAN: 42
GFR AFRICAN AMERICAN: 45
GFR AFRICAN AMERICAN: 48
GFR AFRICAN AMERICAN: 52
GFR AFRICAN AMERICAN: 56
GFR AFRICAN AMERICAN: >60
GFR NON-AFRICAN AMERICAN: 25
GFR NON-AFRICAN AMERICAN: 27
GFR NON-AFRICAN AMERICAN: 35
GFR NON-AFRICAN AMERICAN: 37
GFR NON-AFRICAN AMERICAN: 40
GFR NON-AFRICAN AMERICAN: 43
GFR NON-AFRICAN AMERICAN: 46
GFR NON-AFRICAN AMERICAN: 50
GFR NON-AFRICAN AMERICAN: 54
GFR NON-AFRICAN AMERICAN: 59
GFR NON-AFRICAN AMERICAN: >60
GLOBULIN: 1.5 G/DL
GLOBULIN: 1.7 G/DL
GLOBULIN: 1.8 G/DL
GLOBULIN: 2.8 G/DL
GLOBULIN: 3.5 G/DL
GLUCOSE BLD-MCNC: 103 MG/DL (ref 70–99)
GLUCOSE BLD-MCNC: 117 MG/DL (ref 70–99)
GLUCOSE BLD-MCNC: 117 MG/DL (ref 70–99)
GLUCOSE BLD-MCNC: 118 MG/DL (ref 70–99)
GLUCOSE BLD-MCNC: 122 MG/DL (ref 70–99)
GLUCOSE BLD-MCNC: 123 MG/DL (ref 70–99)
GLUCOSE BLD-MCNC: 123 MG/DL (ref 70–99)
GLUCOSE BLD-MCNC: 125 MG/DL (ref 70–99)
GLUCOSE BLD-MCNC: 127 MG/DL (ref 70–99)
GLUCOSE BLD-MCNC: 129 MG/DL (ref 70–99)
GLUCOSE BLD-MCNC: 130 MG/DL (ref 70–99)
GLUCOSE BLD-MCNC: 131 MG/DL (ref 70–99)
GLUCOSE BLD-MCNC: 136 MG/DL (ref 70–99)
GLUCOSE BLD-MCNC: 136 MG/DL (ref 70–99)
GLUCOSE BLD-MCNC: 138 MG/DL (ref 70–99)
GLUCOSE BLD-MCNC: 141 MG/DL (ref 70–99)
GLUCOSE BLD-MCNC: 141 MG/DL (ref 70–99)
GLUCOSE BLD-MCNC: 143 MG/DL (ref 70–99)
GLUCOSE BLD-MCNC: 144 MG/DL (ref 70–99)
GLUCOSE BLD-MCNC: 144 MG/DL (ref 70–99)
GLUCOSE BLD-MCNC: 146 MG/DL (ref 70–99)
GLUCOSE BLD-MCNC: 147 MG/DL (ref 70–99)
GLUCOSE BLD-MCNC: 150 MG/DL (ref 70–99)
GLUCOSE BLD-MCNC: 153 MG/DL (ref 70–99)
GLUCOSE BLD-MCNC: 154 MG/DL (ref 70–99)
GLUCOSE BLD-MCNC: 156 MG/DL (ref 70–99)
GLUCOSE BLD-MCNC: 157 MG/DL (ref 70–99)
GLUCOSE BLD-MCNC: 158 MG/DL (ref 70–99)
GLUCOSE BLD-MCNC: 164 MG/DL (ref 70–99)
GLUCOSE BLD-MCNC: 165 MG/DL (ref 70–99)
GLUCOSE BLD-MCNC: 166 MG/DL (ref 70–99)
GLUCOSE BLD-MCNC: 166 MG/DL (ref 70–99)
GLUCOSE BLD-MCNC: 167 MG/DL (ref 70–99)
GLUCOSE BLD-MCNC: 169 MG/DL (ref 70–99)
GLUCOSE BLD-MCNC: 173 MG/DL (ref 70–99)
GLUCOSE BLD-MCNC: 173 MG/DL (ref 70–99)
GLUCOSE BLD-MCNC: 176 MG/DL (ref 70–99)
GLUCOSE BLD-MCNC: 179 MG/DL (ref 70–99)
GLUCOSE BLD-MCNC: 179 MG/DL (ref 70–99)
GLUCOSE BLD-MCNC: 182 MG/DL (ref 70–99)
GLUCOSE BLD-MCNC: 184 MG/DL (ref 70–99)
GLUCOSE BLD-MCNC: 185 MG/DL (ref 70–99)
GLUCOSE BLD-MCNC: 186 MG/DL (ref 70–99)
GLUCOSE BLD-MCNC: 187 MG/DL (ref 70–99)
GLUCOSE BLD-MCNC: 195 MG/DL (ref 70–99)
GLUCOSE BLD-MCNC: 197 MG/DL (ref 70–99)
GLUCOSE BLD-MCNC: 199 MG/DL (ref 70–99)
GLUCOSE BLD-MCNC: 199 MG/DL (ref 70–99)
GLUCOSE BLD-MCNC: 201 MG/DL (ref 70–99)
GLUCOSE BLD-MCNC: 203 MG/DL (ref 70–99)
GLUCOSE BLD-MCNC: 203 MG/DL (ref 70–99)
GLUCOSE BLD-MCNC: 204 MG/DL (ref 70–99)
GLUCOSE BLD-MCNC: 207 MG/DL (ref 70–99)
GLUCOSE BLD-MCNC: 207 MG/DL (ref 70–99)
GLUCOSE BLD-MCNC: 208 MG/DL (ref 70–99)
GLUCOSE BLD-MCNC: 209 MG/DL (ref 70–99)
GLUCOSE BLD-MCNC: 210 MG/DL (ref 70–99)
GLUCOSE BLD-MCNC: 211 MG/DL (ref 70–99)
GLUCOSE BLD-MCNC: 212 MG/DL (ref 70–99)
GLUCOSE BLD-MCNC: 213 MG/DL (ref 70–99)
GLUCOSE BLD-MCNC: 213 MG/DL (ref 70–99)
GLUCOSE BLD-MCNC: 215 MG/DL (ref 70–99)
GLUCOSE BLD-MCNC: 219 MG/DL (ref 70–99)
GLUCOSE BLD-MCNC: 219 MG/DL (ref 70–99)
GLUCOSE BLD-MCNC: 221 MG/DL (ref 70–99)
GLUCOSE BLD-MCNC: 222 MG/DL (ref 70–99)
GLUCOSE BLD-MCNC: 223 MG/DL (ref 70–99)
GLUCOSE BLD-MCNC: 226 MG/DL (ref 70–99)
GLUCOSE BLD-MCNC: 226 MG/DL (ref 70–99)
GLUCOSE BLD-MCNC: 228 MG/DL (ref 70–99)
GLUCOSE BLD-MCNC: 230 MG/DL (ref 70–99)
GLUCOSE BLD-MCNC: 231 MG/DL (ref 70–99)
GLUCOSE BLD-MCNC: 233 MG/DL (ref 70–99)
GLUCOSE BLD-MCNC: 233 MG/DL (ref 70–99)
GLUCOSE BLD-MCNC: 236 MG/DL (ref 70–99)
GLUCOSE BLD-MCNC: 236 MG/DL (ref 70–99)
GLUCOSE BLD-MCNC: 238 MG/DL (ref 70–99)
GLUCOSE BLD-MCNC: 239 MG/DL (ref 70–99)
GLUCOSE BLD-MCNC: 243 MG/DL (ref 70–99)
GLUCOSE BLD-MCNC: 245 MG/DL (ref 70–99)
GLUCOSE BLD-MCNC: 248 MG/DL (ref 70–99)
GLUCOSE BLD-MCNC: 249 MG/DL (ref 70–99)
GLUCOSE BLD-MCNC: 252 MG/DL (ref 70–99)
GLUCOSE BLD-MCNC: 254 MG/DL (ref 70–99)
GLUCOSE BLD-MCNC: 254 MG/DL (ref 70–99)
GLUCOSE BLD-MCNC: 256 MG/DL (ref 70–99)
GLUCOSE BLD-MCNC: 260 MG/DL (ref 70–99)
GLUCOSE BLD-MCNC: 261 MG/DL (ref 70–99)
GLUCOSE BLD-MCNC: 262 MG/DL (ref 70–99)
GLUCOSE BLD-MCNC: 267 MG/DL (ref 70–99)
GLUCOSE BLD-MCNC: 267 MG/DL (ref 70–99)
GLUCOSE BLD-MCNC: 281 MG/DL (ref 70–99)
GLUCOSE BLD-MCNC: 282 MG/DL (ref 70–99)
GLUCOSE BLD-MCNC: 287 MG/DL (ref 70–99)
GLUCOSE BLD-MCNC: 290 MG/DL (ref 70–99)
GLUCOSE BLD-MCNC: 292 MG/DL (ref 70–99)
GLUCOSE BLD-MCNC: 298 MG/DL (ref 70–99)
GLUCOSE BLD-MCNC: 300 MG/DL (ref 70–99)
GLUCOSE BLD-MCNC: 304 MG/DL (ref 70–99)
GLUCOSE BLD-MCNC: 317 MG/DL (ref 70–99)
GLUCOSE BLD-MCNC: 318 MG/DL (ref 70–99)
GLUCOSE BLD-MCNC: 435 MG/DL (ref 70–99)
GLUCOSE BLD-MCNC: 571 MG/DL (ref 70–99)
GLUCOSE BLD-MCNC: 67 MG/DL (ref 70–99)
GLUCOSE BLD-MCNC: 68 MG/DL (ref 70–99)
GLUCOSE BLD-MCNC: 73 MG/DL (ref 70–99)
GLUCOSE BLD-MCNC: >600 MG/DL (ref 70–99)
GLUCOSE URINE: NEGATIVE MG/DL
GRAM STAIN RESULT: NORMAL
GRAM STAIN RESULT: NORMAL
HAPTOGLOBIN: 140 MG/DL (ref 30–200)
HAPTOGLOBIN: 46 MG/DL (ref 30–200)
HAPTOGLOBIN: 69 MG/DL (ref 30–200)
HAV IGM SER IA-ACNC: NORMAL
HAV IGM SER IA-ACNC: NORMAL
HBV SURFACE AB TITR SER: 113.3 MIU/ML
HBV SURFACE AB TITR SER: <3.5 MIU/ML
HCO3 ARTERIAL: 15.4 MMOL/L (ref 21–29)
HCO3 ARTERIAL: 15.4 MMOL/L (ref 21–29)
HCO3 ARTERIAL: 17.6 MMOL/L (ref 21–29)
HCO3 ARTERIAL: 27.9 MMOL/L (ref 21–29)
HCO3 ARTERIAL: 28.8 MMOL/L (ref 21–29)
HCT VFR BLD CALC: 16.2 % (ref 40.5–52.5)
HCT VFR BLD CALC: 17 % (ref 40.5–52.5)
HCT VFR BLD CALC: 18.4 % (ref 40.5–52.5)
HCT VFR BLD CALC: 18.6 % (ref 40.5–52.5)
HCT VFR BLD CALC: 18.6 % (ref 40.5–52.5)
HCT VFR BLD CALC: 19.3 % (ref 40.5–52.5)
HCT VFR BLD CALC: 19.3 % (ref 40.5–52.5)
HCT VFR BLD CALC: 19.7 % (ref 40.5–52.5)
HCT VFR BLD CALC: 19.7 % (ref 40.5–52.5)
HCT VFR BLD CALC: 19.8 % (ref 40.5–52.5)
HCT VFR BLD CALC: 19.8 % (ref 40.5–52.5)
HCT VFR BLD CALC: 19.9 % (ref 40.5–52.5)
HCT VFR BLD CALC: 20 % (ref 40.5–52.5)
HCT VFR BLD CALC: 20.2 % (ref 40.5–52.5)
HCT VFR BLD CALC: 20.3 % (ref 40.5–52.5)
HCT VFR BLD CALC: 20.4 % (ref 40.5–52.5)
HCT VFR BLD CALC: 20.8 % (ref 40.5–52.5)
HCT VFR BLD CALC: 21.2 % (ref 40.5–52.5)
HCT VFR BLD CALC: 21.2 % (ref 40.5–52.5)
HCT VFR BLD CALC: 21.9 % (ref 40.5–52.5)
HCT VFR BLD CALC: 21.9 % (ref 40.5–52.5)
HCT VFR BLD CALC: 22 % (ref 40.5–52.5)
HCT VFR BLD CALC: 22.2 % (ref 40.5–52.5)
HCT VFR BLD CALC: 22.3 % (ref 40.5–52.5)
HCT VFR BLD CALC: 22.6 % (ref 40.5–52.5)
HCT VFR BLD CALC: 23 % (ref 40.5–52.5)
HCT VFR BLD CALC: 23.1 % (ref 40.5–52.5)
HCT VFR BLD CALC: 23.5 % (ref 40.5–52.5)
HCT VFR BLD CALC: 23.9 % (ref 40.5–52.5)
HCT VFR BLD CALC: 24.1 % (ref 40.5–52.5)
HCT VFR BLD CALC: 25.8 % (ref 40.5–52.5)
HCT VFR BLD CALC: 26.8 % (ref 40.5–52.5)
HCT VFR BLD CALC: 27.2 % (ref 40.5–52.5)
HCT VFR BLD CALC: 27.6 % (ref 40.5–52.5)
HCT VFR BLD CALC: 28.7 % (ref 40.5–52.5)
HCT VFR BLD CALC: 29.2 % (ref 40.5–52.5)
HCT VFR BLD CALC: 30 % (ref 40.5–52.5)
HCT VFR BLD CALC: 30 % (ref 40.5–52.5)
HCT VFR BLD CALC: 30.9 % (ref 40.5–52.5)
HCT VFR BLD CALC: 31.4 % (ref 40.5–52.5)
HCT VFR BLD CALC: 34.7 % (ref 40.5–52.5)
HCT VFR BLD CALC: 35.6 % (ref 40.5–52.5)
HCT VFR BLD CALC: 36.6 % (ref 40.5–52.5)
HCV RNA QNT REAL-TIME PCR INTERP: NOT DETECTED
HCV RNA, QUANTITATIVE REAL TIME PCR: <1.2 LOG IU
HEMOGLOBIN: 10.2 G/DL (ref 13.5–17.5)
HEMOGLOBIN: 10.3 G/DL (ref 13.5–17.5)
HEMOGLOBIN: 10.3 G/DL (ref 13.5–17.5)
HEMOGLOBIN: 10.7 G/DL (ref 13.5–17.5)
HEMOGLOBIN: 11 G/DL (ref 13.5–17.5)
HEMOGLOBIN: 11.7 G/DL (ref 13.5–17.5)
HEMOGLOBIN: 12 G/DL (ref 13.5–17.5)
HEMOGLOBIN: 12.4 G/DL (ref 13.5–17.5)
HEMOGLOBIN: 5.9 G/DL (ref 13.5–17.5)
HEMOGLOBIN: 6.1 G/DL (ref 13.5–17.5)
HEMOGLOBIN: 6.6 G/DL (ref 13.5–17.5)
HEMOGLOBIN: 6.6 G/DL (ref 13.5–17.5)
HEMOGLOBIN: 6.8 G/DL (ref 13.5–17.5)
HEMOGLOBIN: 6.8 G/DL (ref 13.5–17.5)
HEMOGLOBIN: 6.9 G/DL (ref 13.5–17.5)
HEMOGLOBIN: 6.9 G/DL (ref 13.5–17.5)
HEMOGLOBIN: 7 G/DL (ref 13.5–17.5)
HEMOGLOBIN: 7.1 G/DL (ref 13.5–17.5)
HEMOGLOBIN: 7.2 G/DL (ref 13.5–17.5)
HEMOGLOBIN: 7.2 G/DL (ref 13.5–17.5)
HEMOGLOBIN: 7.3 G/DL (ref 13.5–17.5)
HEMOGLOBIN: 7.6 G/DL (ref 13.5–17.5)
HEMOGLOBIN: 7.6 G/DL (ref 13.5–17.5)
HEMOGLOBIN: 7.7 G/DL (ref 13.5–17.5)
HEMOGLOBIN: 7.7 G/DL (ref 13.5–17.5)
HEMOGLOBIN: 7.8 G/DL (ref 13.5–17.5)
HEMOGLOBIN: 7.9 G/DL (ref 13.5–17.5)
HEMOGLOBIN: 7.9 G/DL (ref 13.5–17.5)
HEMOGLOBIN: 8 G/DL (ref 13.5–17.5)
HEMOGLOBIN: 8 G/DL (ref 13.5–17.5)
HEMOGLOBIN: 8.1 G/DL (ref 13.5–17.5)
HEMOGLOBIN: 8.2 G/DL (ref 13.5–17.5)
HEMOGLOBIN: 8.2 G/DL (ref 13.5–17.5)
HEMOGLOBIN: 8.4 G/DL (ref 13.5–17.5)
HEMOGLOBIN: 8.5 G/DL (ref 13.5–17.5)
HEMOGLOBIN: 9.1 G/DL (ref 13.5–17.5)
HEMOGLOBIN: 9.3 G/DL (ref 13.5–17.5)
HEMOGLOBIN: 9.6 G/DL (ref 13.5–17.5)
HEMOGLOBIN: 9.6 G/DL (ref 13.5–17.5)
HEMOGLOBIN: 9.8 G/DL (ref 13.5–17.5)
HEPATITIS B CORE IGM ANTIBODY: NORMAL
HEPATITIS B CORE TOTAL ANTIBODY: NEGATIVE
HEPATITIS B CORE TOTAL ANTIBODY: NEGATIVE
HEPATITIS B SURFACE ANTIGEN INTERPRETATION: NORMAL
HEPATITIS BE ANTIBODY: NEGATIVE
HEPATITIS C ANTIBODY INTERPRETATION: NORMAL
HEPATITIS C ANTIBODY INTERPRETATION: NORMAL
HEPATITIS C RNA PCR QUANT: <15 IU/ML
HERPES TYPE 1/2 IGM COMBINED: 0.19 IV
HERPES TYPE I/II IGG COMBINED: >22.4 IV
HIV AG/AB: NORMAL
HIV ANTIGEN: NORMAL
HIV-1 ANTIBODY: NORMAL
HIV-1 DNA, QUAL, PCR: NOT DETECTED
HIV-2 AB: NORMAL
HTLV I/II AB: NEGATIVE
IMMATURE RETIC FRACT: 0.19 (ref 0.21–0.37)
INR BLD: 1.11 (ref 0.86–1.14)
INR BLD: 1.21 (ref 0.86–1.14)
INR BLD: 1.28 (ref 0.86–1.14)
INR BLD: 1.32 (ref 0.86–1.14)
INR BLD: 1.35 (ref 0.86–1.14)
INR BLD: 1.38 (ref 0.86–1.14)
INR BLD: 1.39 (ref 0.86–1.14)
INR BLD: 1.39 (ref 0.86–1.14)
INR BLD: 1.45 (ref 0.86–1.14)
INR BLD: 1.64 (ref 0.86–1.14)
INR BLD: 1.68 (ref 0.86–1.14)
INR BLD: 1.69 (ref 0.86–1.14)
INR BLD: 1.7 (ref 0.86–1.14)
INR BLD: 1.81 (ref 0.86–1.14)
INR BLD: 4.8 (ref 0.86–1.14)
KETONES, URINE: NEGATIVE MG/DL
LACTATE DEHYDROGENASE: 107 U/L (ref 100–190)
LACTATE DEHYDROGENASE: 120 U/L (ref 100–190)
LACTATE DEHYDROGENASE: 121 U/L (ref 100–190)
LACTATE DEHYDROGENASE: 126 U/L (ref 100–190)
LACTATE DEHYDROGENASE: 132 U/L (ref 100–190)
LACTATE DEHYDROGENASE: 137 U/L (ref 100–190)
LACTATE DEHYDROGENASE: 148 U/L (ref 100–190)
LACTATE DEHYDROGENASE: 151 U/L (ref 100–190)
LACTATE DEHYDROGENASE: 154 U/L (ref 100–190)
LACTATE DEHYDROGENASE: 155 U/L (ref 100–190)
LACTATE DEHYDROGENASE: 155 U/L (ref 100–190)
LACTATE DEHYDROGENASE: 158 U/L (ref 100–190)
LACTATE DEHYDROGENASE: 165 U/L (ref 100–190)
LACTATE DEHYDROGENASE: 174 U/L (ref 100–190)
LACTATE DEHYDROGENASE: 177 U/L (ref 100–190)
LACTATE DEHYDROGENASE: 179 U/L (ref 100–190)
LACTATE DEHYDROGENASE: 179 U/L (ref 100–190)
LACTATE DEHYDROGENASE: 180 U/L (ref 100–190)
LACTATE DEHYDROGENASE: 181 U/L (ref 100–190)
LACTATE DEHYDROGENASE: 204 U/L (ref 100–190)
LACTATE DEHYDROGENASE: 244 U/L (ref 100–190)
LACTATE DEHYDROGENASE: 246 U/L (ref 100–190)
LACTATE DEHYDROGENASE: 253 U/L (ref 100–190)
LACTATE DEHYDROGENASE: 253 U/L (ref 100–190)
LACTATE: 1.67 MMOL/L (ref 0.4–2)
LACTATE: 10.61 MMOL/L (ref 0.4–2)
LACTATE: 5.41 MMOL/L (ref 0.4–2)
LACTATE: 7.47 MMOL/L (ref 0.4–2)
LACTATE: 9.78 MMOL/L (ref 0.4–2)
LACTIC ACID: 1 MMOL/L (ref 0.4–2)
LACTIC ACID: 1.8 MMOL/L (ref 0.4–2)
LACTIC ACID: 10.6 MMOL/L (ref 0.4–2)
LACTIC ACID: 15.1 MMOL/L (ref 0.4–2)
LACTIC ACID: 16.9 MMOL/L (ref 0.4–2)
LACTIC ACID: 4.7 MMOL/L (ref 0.4–2)
LACTIC ACID: 5.3 MMOL/L (ref 0.4–2)
LACTIC ACID: 7.4 MMOL/L (ref 0.4–2)
LACTIC ACID: 8.2 MMOL/L (ref 0.4–2)
LEUKOCYTE ESTERASE, URINE: ABNORMAL
LEUKOCYTE ESTERASE, URINE: NEGATIVE
LV EF: 50 %
LV EF: 55 %
LVEF MODALITY: NORMAL
LVEF MODALITY: NORMAL
LYMPHOCYTES ABSOLUTE: 0 K/UL (ref 1–5.1)
LYMPHOCYTES ABSOLUTE: 0.1 K/UL (ref 1–5.1)
LYMPHOCYTES ABSOLUTE: 0.2 K/UL (ref 1–5.1)
LYMPHOCYTES ABSOLUTE: 0.4 K/UL (ref 1–5.1)
LYMPHOCYTES ABSOLUTE: 0.4 K/UL (ref 1–5.1)
LYMPHOCYTES ABSOLUTE: 0.6 K/UL (ref 1–5.1)
LYMPHOCYTES ABSOLUTE: 1.9 K/UL (ref 1–5.1)
LYMPHOCYTES ABSOLUTE: 2 K/UL (ref 1–5.1)
LYMPHOCYTES ABSOLUTE: 2.4 K/UL (ref 1–5.1)
LYMPHOCYTES RELATIVE PERCENT: 1.2 %
LYMPHOCYTES RELATIVE PERCENT: 1.3 %
LYMPHOCYTES RELATIVE PERCENT: 1.3 %
LYMPHOCYTES RELATIVE PERCENT: 16 %
LYMPHOCYTES RELATIVE PERCENT: 16 %
LYMPHOCYTES RELATIVE PERCENT: 16.6 %
LYMPHOCYTES RELATIVE PERCENT: 2.4 %
LYMPHOCYTES RELATIVE PERCENT: 26.8 %
LYMPHOCYTES RELATIVE PERCENT: 3.2 %
LYMPHOCYTES RELATIVE PERCENT: 5.5 %
LYMPHOCYTES RELATIVE PERCENT: 6 %
LYMPHOCYTES RELATIVE PERCENT: 6 %
LYMPHOCYTES RELATIVE PERCENT: 6.1 %
LYMPHOCYTES RELATIVE PERCENT: 6.5 %
LYMPHOCYTES RELATIVE PERCENT: 9 %
LYMPHOCYTES, BAL: 1 % (ref 5–10)
MACROCYTES: ABNORMAL
MACROPHAGES, BAL: 22 % (ref 90–95)
MACROPHAGES, BAL: 76 % (ref 90–95)
MAGNESIUM: 1.4 MG/DL (ref 1.8–2.4)
MAGNESIUM: 1.5 MG/DL (ref 1.8–2.4)
MAGNESIUM: 1.5 MG/DL (ref 1.8–2.4)
MAGNESIUM: 1.6 MG/DL (ref 1.8–2.4)
MAGNESIUM: 1.7 MG/DL (ref 1.8–2.4)
MAGNESIUM: 1.8 MG/DL (ref 1.8–2.4)
MAGNESIUM: 1.9 MG/DL (ref 1.8–2.4)
MAGNESIUM: 2 MG/DL (ref 1.8–2.4)
MAGNESIUM: 2 MG/DL (ref 1.8–2.4)
MAGNESIUM: 2.1 MG/DL (ref 1.8–2.4)
MAGNESIUM: 2.1 MG/DL (ref 1.8–2.4)
MAGNESIUM: 2.3 MG/DL (ref 1.8–2.4)
MAGNESIUM: 2.4 MG/DL (ref 1.8–2.4)
MCH RBC QN AUTO: 28.5 PG (ref 26–34)
MCH RBC QN AUTO: 28.9 PG (ref 26–34)
MCH RBC QN AUTO: 29 PG (ref 26–34)
MCH RBC QN AUTO: 29.2 PG (ref 26–34)
MCH RBC QN AUTO: 29.2 PG (ref 26–34)
MCH RBC QN AUTO: 29.3 PG (ref 26–34)
MCH RBC QN AUTO: 29.4 PG (ref 26–34)
MCH RBC QN AUTO: 29.5 PG (ref 26–34)
MCH RBC QN AUTO: 29.5 PG (ref 26–34)
MCH RBC QN AUTO: 29.6 PG (ref 26–34)
MCH RBC QN AUTO: 29.8 PG (ref 26–34)
MCH RBC QN AUTO: 29.9 PG (ref 26–34)
MCH RBC QN AUTO: 29.9 PG (ref 26–34)
MCH RBC QN AUTO: 30 PG (ref 26–34)
MCH RBC QN AUTO: 30.3 PG (ref 26–34)
MCH RBC QN AUTO: 30.8 PG (ref 26–34)
MCH RBC QN AUTO: 31.2 PG (ref 26–34)
MCH RBC QN AUTO: 31.3 PG (ref 26–34)
MCH RBC QN AUTO: 32.1 PG (ref 26–34)
MCH RBC QN AUTO: 32.5 PG (ref 26–34)
MCH RBC QN AUTO: 32.6 PG (ref 26–34)
MCH RBC QN AUTO: 32.9 PG (ref 26–34)
MCH RBC QN AUTO: 33.1 PG (ref 26–34)
MCH RBC QN AUTO: 33.2 PG (ref 26–34)
MCH RBC QN AUTO: 33.2 PG (ref 26–34)
MCH RBC QN AUTO: 33.4 PG (ref 26–34)
MCH RBC QN AUTO: 33.4 PG (ref 26–34)
MCH RBC QN AUTO: 33.5 PG (ref 26–34)
MCH RBC QN AUTO: 33.6 PG (ref 26–34)
MCH RBC QN AUTO: 33.8 PG (ref 26–34)
MCH RBC QN AUTO: 33.9 PG (ref 26–34)
MCH RBC QN AUTO: 33.9 PG (ref 26–34)
MCH RBC QN AUTO: 34 PG (ref 26–34)
MCH RBC QN AUTO: 34.1 PG (ref 26–34)
MCH RBC QN AUTO: 34.2 PG (ref 26–34)
MCH RBC QN AUTO: 34.3 PG (ref 26–34)
MCH RBC QN AUTO: 34.4 PG (ref 26–34)
MCHC RBC AUTO-ENTMCNC: 33.6 G/DL (ref 31–36)
MCHC RBC AUTO-ENTMCNC: 33.8 G/DL (ref 31–36)
MCHC RBC AUTO-ENTMCNC: 33.9 G/DL (ref 31–36)
MCHC RBC AUTO-ENTMCNC: 33.9 G/DL (ref 31–36)
MCHC RBC AUTO-ENTMCNC: 34.1 G/DL (ref 31–36)
MCHC RBC AUTO-ENTMCNC: 34.4 G/DL (ref 31–36)
MCHC RBC AUTO-ENTMCNC: 34.4 G/DL (ref 31–36)
MCHC RBC AUTO-ENTMCNC: 34.5 G/DL (ref 31–36)
MCHC RBC AUTO-ENTMCNC: 34.6 G/DL (ref 31–36)
MCHC RBC AUTO-ENTMCNC: 34.6 G/DL (ref 31–36)
MCHC RBC AUTO-ENTMCNC: 34.9 G/DL (ref 31–36)
MCHC RBC AUTO-ENTMCNC: 35 G/DL (ref 31–36)
MCHC RBC AUTO-ENTMCNC: 35.1 G/DL (ref 31–36)
MCHC RBC AUTO-ENTMCNC: 35.2 G/DL (ref 31–36)
MCHC RBC AUTO-ENTMCNC: 35.2 G/DL (ref 31–36)
MCHC RBC AUTO-ENTMCNC: 35.3 G/DL (ref 31–36)
MCHC RBC AUTO-ENTMCNC: 35.4 G/DL (ref 31–36)
MCHC RBC AUTO-ENTMCNC: 35.7 G/DL (ref 31–36)
MCHC RBC AUTO-ENTMCNC: 35.8 G/DL (ref 31–36)
MCHC RBC AUTO-ENTMCNC: 35.8 G/DL (ref 31–36)
MCHC RBC AUTO-ENTMCNC: 36 G/DL (ref 31–36)
MCHC RBC AUTO-ENTMCNC: 36 G/DL (ref 31–36)
MCHC RBC AUTO-ENTMCNC: 36.1 G/DL (ref 31–36)
MCHC RBC AUTO-ENTMCNC: 36.1 G/DL (ref 31–36)
MCHC RBC AUTO-ENTMCNC: 36.3 G/DL (ref 31–36)
MCHC RBC AUTO-ENTMCNC: 36.4 G/DL (ref 31–36)
MCHC RBC AUTO-ENTMCNC: 36.5 G/DL (ref 31–36)
MCV RBC AUTO: 101.7 FL (ref 80–100)
MCV RBC AUTO: 80.2 FL (ref 80–100)
MCV RBC AUTO: 80.4 FL (ref 80–100)
MCV RBC AUTO: 80.5 FL (ref 80–100)
MCV RBC AUTO: 81.3 FL (ref 80–100)
MCV RBC AUTO: 81.3 FL (ref 80–100)
MCV RBC AUTO: 81.4 FL (ref 80–100)
MCV RBC AUTO: 81.7 FL (ref 80–100)
MCV RBC AUTO: 81.7 FL (ref 80–100)
MCV RBC AUTO: 81.9 FL (ref 80–100)
MCV RBC AUTO: 82.9 FL (ref 80–100)
MCV RBC AUTO: 83.3 FL (ref 80–100)
MCV RBC AUTO: 83.5 FL (ref 80–100)
MCV RBC AUTO: 83.6 FL (ref 80–100)
MCV RBC AUTO: 83.7 FL (ref 80–100)
MCV RBC AUTO: 84.4 FL (ref 80–100)
MCV RBC AUTO: 85 FL (ref 80–100)
MCV RBC AUTO: 85 FL (ref 80–100)
MCV RBC AUTO: 85.5 FL (ref 80–100)
MCV RBC AUTO: 86 FL (ref 80–100)
MCV RBC AUTO: 86.2 FL (ref 80–100)
MCV RBC AUTO: 88.5 FL (ref 80–100)
MCV RBC AUTO: 90 FL (ref 80–100)
MCV RBC AUTO: 90.8 FL (ref 80–100)
MCV RBC AUTO: 92.7 FL (ref 80–100)
MCV RBC AUTO: 92.7 FL (ref 80–100)
MCV RBC AUTO: 93.2 FL (ref 80–100)
MCV RBC AUTO: 93.5 FL (ref 80–100)
MCV RBC AUTO: 94.7 FL (ref 80–100)
MCV RBC AUTO: 95.8 FL (ref 80–100)
MCV RBC AUTO: 95.8 FL (ref 80–100)
MCV RBC AUTO: 96.2 FL (ref 80–100)
MCV RBC AUTO: 97.1 FL (ref 80–100)
MCV RBC AUTO: 97.1 FL (ref 80–100)
MCV RBC AUTO: 97.6 FL (ref 80–100)
MCV RBC AUTO: 97.9 FL (ref 80–100)
MCV RBC AUTO: 97.9 FL (ref 80–100)
MCV RBC AUTO: 98 FL (ref 80–100)
MCV RBC AUTO: 98.3 FL (ref 80–100)
MCV RBC AUTO: 98.8 FL (ref 80–100)
MCV RBC AUTO: 98.8 FL (ref 80–100)
MCV RBC AUTO: 99.9 FL (ref 80–100)
MICROALBUMIN UR-MCNC: <1.2 MG/DL
MICROALBUMIN/CREAT UR-RTO: ABNORMAL MG/G (ref 0–30)
MICROCYTES: ABNORMAL
MICROSCOPIC EXAMINATION: NORMAL
MICROSCOPIC EXAMINATION: YES
MISCELLANEOUS LAB TEST ORDER: ABNORMAL
MISCELLANEOUS LAB TEST ORDER: NORMAL
MISCELLANEOUS LAB TEST ORDER: NORMAL
MONOCYTES ABSOLUTE: 0 K/UL (ref 0–1.3)
MONOCYTES ABSOLUTE: 0.1 K/UL (ref 0–1.3)
MONOCYTES ABSOLUTE: 0.2 K/UL (ref 0–1.3)
MONOCYTES ABSOLUTE: 0.2 K/UL (ref 0–1.3)
MONOCYTES ABSOLUTE: 0.4 K/UL (ref 0–1.3)
MONOCYTES ABSOLUTE: 0.7 K/UL (ref 0–1.3)
MONOCYTES ABSOLUTE: 1.5 K/UL (ref 0–1.3)
MONOCYTES ABSOLUTE: 2.1 K/UL (ref 0–1.3)
MONOCYTES RELATIVE PERCENT: 0 %
MONOCYTES RELATIVE PERCENT: 0.1 %
MONOCYTES RELATIVE PERCENT: 0.1 %
MONOCYTES RELATIVE PERCENT: 0.3 %
MONOCYTES RELATIVE PERCENT: 0.8 %
MONOCYTES RELATIVE PERCENT: 1 %
MONOCYTES RELATIVE PERCENT: 1.5 %
MONOCYTES RELATIVE PERCENT: 12 %
MONOCYTES RELATIVE PERCENT: 12.5 %
MONOCYTES RELATIVE PERCENT: 13 %
MONOCYTES RELATIVE PERCENT: 15 %
MONOCYTES RELATIVE PERCENT: 3.8 %
MONOCYTES RELATIVE PERCENT: 6 %
MONOCYTES RELATIVE PERCENT: 9 %
MONOCYTES RELATIVE PERCENT: 9.1 %
MUCUS: ABNORMAL /LPF
MUCUS: ABNORMAL /LPF
NEUTROPHILS ABSOLUTE: 0.5 K/UL (ref 1.7–7.7)
NEUTROPHILS ABSOLUTE: 0.5 K/UL (ref 1.7–7.7)
NEUTROPHILS ABSOLUTE: 1 K/UL (ref 1.7–7.7)
NEUTROPHILS ABSOLUTE: 1.1 K/UL (ref 1.7–7.7)
NEUTROPHILS ABSOLUTE: 1.4 K/UL (ref 1.7–7.7)
NEUTROPHILS ABSOLUTE: 1.4 K/UL (ref 1.7–7.7)
NEUTROPHILS ABSOLUTE: 2.7 K/UL (ref 1.7–7.7)
NEUTROPHILS ABSOLUTE: 2.9 K/UL (ref 1.7–7.7)
NEUTROPHILS ABSOLUTE: 4.5 K/UL (ref 1.7–7.7)
NEUTROPHILS ABSOLUTE: 4.6 K/UL (ref 1.7–7.7)
NEUTROPHILS ABSOLUTE: 6 K/UL (ref 1.7–7.7)
NEUTROPHILS ABSOLUTE: 6.4 K/UL (ref 1.7–7.7)
NEUTROPHILS ABSOLUTE: 8.3 K/UL (ref 1.7–7.7)
NEUTROPHILS ABSOLUTE: 8.6 K/UL (ref 1.7–7.7)
NEUTROPHILS ABSOLUTE: 9.6 K/UL (ref 1.7–7.7)
NEUTROPHILS RELATIVE PERCENT: 61.9 %
NEUTROPHILS RELATIVE PERCENT: 68 %
NEUTROPHILS RELATIVE PERCENT: 69 %
NEUTROPHILS RELATIVE PERCENT: 70.1 %
NEUTROPHILS RELATIVE PERCENT: 75 %
NEUTROPHILS RELATIVE PERCENT: 85 %
NEUTROPHILS RELATIVE PERCENT: 86 %
NEUTROPHILS RELATIVE PERCENT: 89.4 %
NEUTROPHILS RELATIVE PERCENT: 91.8 %
NEUTROPHILS RELATIVE PERCENT: 93.4 %
NEUTROPHILS RELATIVE PERCENT: 94.5 %
NEUTROPHILS RELATIVE PERCENT: 95.8 %
NEUTROPHILS RELATIVE PERCENT: 96.5 %
NEUTROPHILS RELATIVE PERCENT: 97.7 %
NEUTROPHILS RELATIVE PERCENT: 97.7 %
NITRITE, URINE: NEGATIVE
NUMBER OF CELLS COUNTED BAL (LAVAGE): 200
NUMBER OF CELLS COUNTED BAL (LAVAGE): 90
O2 SAT, ARTERIAL: 95 % (ref 93–100)
O2 SAT, ARTERIAL: 97 % (ref 93–100)
O2 SAT, ARTERIAL: 99 % (ref 93–100)
ORGANISM: ABNORMAL
PCO2 ARTERIAL: 26.6 MM HG (ref 35–45)
PCO2 ARTERIAL: 27.7 MM HG (ref 35–45)
PCO2 ARTERIAL: 32.8 MM HG (ref 35–45)
PCO2 ARTERIAL: 35.8 MM HG (ref 35–45)
PCO2 ARTERIAL: 43 MM HG (ref 35–45)
PDW BLD-RTO: 12.5 % (ref 12.4–15.4)
PDW BLD-RTO: 12.7 % (ref 12.4–15.4)
PDW BLD-RTO: 12.7 % (ref 12.4–15.4)
PDW BLD-RTO: 12.8 % (ref 12.4–15.4)
PDW BLD-RTO: 12.9 % (ref 12.4–15.4)
PDW BLD-RTO: 12.9 % (ref 12.4–15.4)
PDW BLD-RTO: 13 % (ref 12.4–15.4)
PDW BLD-RTO: 13.1 % (ref 12.4–15.4)
PDW BLD-RTO: 13.1 % (ref 12.4–15.4)
PDW BLD-RTO: 13.2 % (ref 12.4–15.4)
PDW BLD-RTO: 13.3 % (ref 12.4–15.4)
PDW BLD-RTO: 13.4 % (ref 12.4–15.4)
PDW BLD-RTO: 13.4 % (ref 12.4–15.4)
PDW BLD-RTO: 13.5 % (ref 12.4–15.4)
PDW BLD-RTO: 13.9 % (ref 12.4–15.4)
PDW BLD-RTO: 14 % (ref 12.4–15.4)
PDW BLD-RTO: 14.1 % (ref 12.4–15.4)
PDW BLD-RTO: 14.5 % (ref 12.4–15.4)
PDW BLD-RTO: 14.5 % (ref 12.4–15.4)
PDW BLD-RTO: 14.7 % (ref 12.4–15.4)
PDW BLD-RTO: 14.8 % (ref 12.4–15.4)
PDW BLD-RTO: 14.8 % (ref 12.4–15.4)
PDW BLD-RTO: 14.9 % (ref 12.4–15.4)
PDW BLD-RTO: 15 % (ref 12.4–15.4)
PDW BLD-RTO: 15.7 % (ref 12.4–15.4)
PERFORMED ON: ABNORMAL
PH ARTERIAL: 7.34 (ref 7.35–7.45)
PH ARTERIAL: 7.35 (ref 7.35–7.45)
PH ARTERIAL: 7.37 (ref 7.35–7.45)
PH ARTERIAL: 7.42 (ref 7.35–7.45)
PH ARTERIAL: 7.51 (ref 7.35–7.45)
PH UA: 6
PH VENOUS: 7.26 (ref 7.35–7.45)
PHOSPHORUS: 2.3 MG/DL (ref 2.5–4.9)
PHOSPHORUS: 2.6 MG/DL (ref 2.5–4.9)
PHOSPHORUS: 2.7 MG/DL (ref 2.5–4.9)
PHOSPHORUS: 2.8 MG/DL (ref 2.5–4.9)
PHOSPHORUS: 2.9 MG/DL (ref 2.5–4.9)
PHOSPHORUS: 2.9 MG/DL (ref 2.5–4.9)
PHOSPHORUS: 3 MG/DL (ref 2.5–4.9)
PHOSPHORUS: 3 MG/DL (ref 2.5–4.9)
PHOSPHORUS: 3.1 MG/DL (ref 2.5–4.9)
PHOSPHORUS: 3.1 MG/DL (ref 2.5–4.9)
PHOSPHORUS: 3.2 MG/DL (ref 2.5–4.9)
PHOSPHORUS: 3.5 MG/DL (ref 2.5–4.9)
PHOSPHORUS: 3.7 MG/DL (ref 2.5–4.9)
PHOSPHORUS: 4 MG/DL (ref 2.5–4.9)
PHOSPHORUS: 4.2 MG/DL (ref 2.5–4.9)
PHOSPHORUS: 4.4 MG/DL (ref 2.5–4.9)
PHOSPHORUS: 4.4 MG/DL (ref 2.5–4.9)
PHOSPHORUS: 4.5 MG/DL (ref 2.5–4.9)
PHOSPHORUS: 4.6 MG/DL (ref 2.5–4.9)
PHOSPHORUS: 4.6 MG/DL (ref 2.5–4.9)
PHOSPHORUS: 4.8 MG/DL (ref 2.5–4.9)
PHOSPHORUS: 4.9 MG/DL (ref 2.5–4.9)
PHOSPHORUS: 4.9 MG/DL (ref 2.5–4.9)
PHOSPHORUS: 5.2 MG/DL (ref 2.5–4.9)
PHOSPHORUS: 5.2 MG/DL (ref 2.5–4.9)
PHOSPHORUS: 5.3 MG/DL (ref 2.5–4.9)
PHOSPHORUS: 5.4 MG/DL (ref 2.5–4.9)
PHOSPHORUS: 5.4 MG/DL (ref 2.5–4.9)
PHOSPHORUS: 5.6 MG/DL (ref 2.5–4.9)
PHOSPHORUS: 5.6 MG/DL (ref 2.5–4.9)
PHOSPHORUS: 5.7 MG/DL (ref 2.5–4.9)
PHOSPHORUS: 6 MG/DL (ref 2.5–4.9)
PHOSPHORUS: 6 MG/DL (ref 2.5–4.9)
PHOSPHORUS: 6.4 MG/DL (ref 2.5–4.9)
PHOSPHORUS: 6.5 MG/DL (ref 2.5–4.9)
PHOSPHORUS: 6.8 MG/DL (ref 2.5–4.9)
PLATELET # BLD: 10 K/UL (ref 135–450)
PLATELET # BLD: 10 K/UL (ref 135–450)
PLATELET # BLD: 11 K/UL (ref 135–450)
PLATELET # BLD: 120 K/UL (ref 135–450)
PLATELET # BLD: 13 K/UL (ref 135–450)
PLATELET # BLD: 13 K/UL (ref 135–450)
PLATELET # BLD: 14 K/UL (ref 135–450)
PLATELET # BLD: 152 K/UL (ref 135–450)
PLATELET # BLD: 162 K/UL (ref 135–450)
PLATELET # BLD: 18 K/UL (ref 135–450)
PLATELET # BLD: 18 K/UL (ref 135–450)
PLATELET # BLD: 19 K/UL (ref 135–450)
PLATELET # BLD: 203 K/UL (ref 135–450)
PLATELET # BLD: 22 K/UL (ref 135–450)
PLATELET # BLD: 234 K/UL (ref 135–450)
PLATELET # BLD: 283 K/UL (ref 135–450)
PLATELET # BLD: 309 K/UL (ref 135–450)
PLATELET # BLD: 321 K/UL (ref 135–450)
PLATELET # BLD: 34 K/UL (ref 135–450)
PLATELET # BLD: 361 K/UL (ref 135–450)
PLATELET # BLD: 386 K/UL (ref 135–450)
PLATELET # BLD: 4 K/UL (ref 135–450)
PLATELET # BLD: 422 K/UL (ref 135–450)
PLATELET # BLD: 47 K/UL (ref 135–450)
PLATELET # BLD: 6 K/UL (ref 135–450)
PLATELET # BLD: 6 K/UL (ref 135–450)
PLATELET # BLD: 7 K/UL (ref 135–450)
PLATELET # BLD: 8 K/UL (ref 135–450)
PLATELET # BLD: 8 K/UL (ref 135–450)
PLATELET # BLD: 9 K/UL (ref 135–450)
PLATELET # BLD: 94 K/UL (ref 135–450)
PLATELET SLIDE REVIEW: ADEQUATE
PLATELET SLIDE REVIEW: ADEQUATE
PMV BLD AUTO: 6.5 FL (ref 5–10.5)
PMV BLD AUTO: 6.9 FL (ref 5–10.5)
PMV BLD AUTO: 6.9 FL (ref 5–10.5)
PMV BLD AUTO: 7 FL (ref 5–10.5)
PMV BLD AUTO: 7.1 FL (ref 5–10.5)
PMV BLD AUTO: 7.2 FL (ref 5–10.5)
PMV BLD AUTO: 7.3 FL (ref 5–10.5)
PMV BLD AUTO: 7.4 FL (ref 5–10.5)
PMV BLD AUTO: 7.5 FL (ref 5–10.5)
PMV BLD AUTO: 7.7 FL (ref 5–10.5)
PMV BLD AUTO: 7.8 FL (ref 5–10.5)
PMV BLD AUTO: 7.8 FL (ref 5–10.5)
PMV BLD AUTO: 7.9 FL (ref 5–10.5)
PMV BLD AUTO: 8 FL (ref 5–10.5)
PMV BLD AUTO: 8.1 FL (ref 5–10.5)
PMV BLD AUTO: 8.1 FL (ref 5–10.5)
PMV BLD AUTO: 8.4 FL (ref 5–10.5)
PMV BLD AUTO: 8.5 FL (ref 5–10.5)
PMV BLD AUTO: 8.6 FL (ref 5–10.5)
PMV BLD AUTO: 8.9 FL (ref 5–10.5)
PMV BLD AUTO: 9.1 FL (ref 5–10.5)
PMV BLD AUTO: 9.1 FL (ref 5–10.5)
PMV BLD AUTO: 9.2 FL (ref 5–10.5)
PMV BLD AUTO: 9.4 FL (ref 5–10.5)
PMV BLD AUTO: 9.6 FL (ref 5–10.5)
PO2 ARTERIAL: 115.9 MM HG (ref 75–108)
PO2 ARTERIAL: 141.4 MM HG (ref 75–108)
PO2 ARTERIAL: 161.6 MM HG (ref 75–108)
PO2 ARTERIAL: 75.7 MM HG (ref 75–108)
PO2 ARTERIAL: 91.3 MM HG (ref 75–108)
POC POTASSIUM: 3.9 MMOL/L (ref 3.5–5.1)
POC POTASSIUM: 4.3 MMOL/L (ref 3.5–5.1)
POC SAMPLE TYPE: ABNORMAL
POC SODIUM: 138 MMOL/L (ref 136–145)
POC SODIUM: 149 MMOL/L (ref 136–145)
POTASSIUM REFLEX MAGNESIUM: 3.3 MMOL/L (ref 3.5–5.1)
POTASSIUM REFLEX MAGNESIUM: 4.7 MMOL/L (ref 3.5–5.1)
POTASSIUM REFLEX MAGNESIUM: 5.5 MMOL/L (ref 3.5–5.1)
POTASSIUM SERPL-SCNC: 3.2 MMOL/L (ref 3.5–5.1)
POTASSIUM SERPL-SCNC: 3.3 MMOL/L (ref 3.5–5.1)
POTASSIUM SERPL-SCNC: 3.4 MMOL/L (ref 3.5–5.1)
POTASSIUM SERPL-SCNC: 3.5 MMOL/L (ref 3.5–5.1)
POTASSIUM SERPL-SCNC: 3.6 MMOL/L (ref 3.5–5.1)
POTASSIUM SERPL-SCNC: 3.7 MMOL/L (ref 3.5–5.1)
POTASSIUM SERPL-SCNC: 3.8 MMOL/L (ref 3.5–5.1)
POTASSIUM SERPL-SCNC: 3.9 MMOL/L (ref 3.5–5.1)
POTASSIUM SERPL-SCNC: 4 MMOL/L (ref 3.5–5.1)
POTASSIUM SERPL-SCNC: 4.1 MMOL/L (ref 3.5–5.1)
POTASSIUM SERPL-SCNC: 4.1 MMOL/L (ref 3.5–5.1)
POTASSIUM SERPL-SCNC: 4.2 MMOL/L (ref 3.5–5.1)
POTASSIUM SERPL-SCNC: 4.3 MMOL/L (ref 3.5–5.1)
POTASSIUM SERPL-SCNC: 4.4 MMOL/L (ref 3.5–5.1)
POTASSIUM SERPL-SCNC: 4.5 MMOL/L (ref 3.5–5.1)
POTASSIUM SERPL-SCNC: 4.7 MMOL/L (ref 3.5–5.1)
POTASSIUM SERPL-SCNC: 4.7 MMOL/L (ref 3.5–5.1)
POTASSIUM SERPL-SCNC: 4.8 MMOL/L (ref 3.5–5.1)
POTASSIUM SERPL-SCNC: 4.9 MMOL/L (ref 3.5–5.1)
POTASSIUM SERPL-SCNC: 5 MMOL/L (ref 3.5–5.1)
POTASSIUM SERPL-SCNC: 5.1 MMOL/L (ref 3.5–5.1)
POTASSIUM SERPL-SCNC: 5.7 MMOL/L (ref 3.5–5.1)
POTASSIUM SERPL-SCNC: 5.7 MMOL/L (ref 3.5–5.1)
PRELIMINARY: NORMAL
PRO-BNP: 538 PG/ML (ref 0–124)
PRO-BNP: 576 PG/ML (ref 0–124)
PROCALCITONIN: 1.72 NG/ML (ref 0–0.15)
PROTEIN UA: ABNORMAL MG/DL
PROTEIN UA: NEGATIVE MG/DL
PROTHROMBIN TIME: 12.7 SEC (ref 9.8–13)
PROTHROMBIN TIME: 13.8 SEC (ref 9.8–13)
PROTHROMBIN TIME: 14.6 SEC (ref 9.8–13)
PROTHROMBIN TIME: 15 SEC (ref 9.8–13)
PROTHROMBIN TIME: 15.4 SEC (ref 9.8–13)
PROTHROMBIN TIME: 15.7 SEC (ref 9.8–13)
PROTHROMBIN TIME: 15.8 SEC (ref 9.8–13)
PROTHROMBIN TIME: 15.9 SEC (ref 9.8–13)
PROTHROMBIN TIME: 16.5 SEC (ref 9.8–13)
PROTHROMBIN TIME: 18.7 SEC (ref 9.8–13)
PROTHROMBIN TIME: 19.1 SEC (ref 9.8–13)
PROTHROMBIN TIME: 19.3 SEC (ref 9.8–13)
PROTHROMBIN TIME: 19.4 SEC (ref 9.8–13)
PROTHROMBIN TIME: 20.6 SEC (ref 9.8–13)
PROTHROMBIN TIME: 54.7 SEC (ref 9.8–13)
RBC # BLD: 1.87 M/UL (ref 4.2–5.9)
RBC # BLD: 2.01 M/UL (ref 4.2–5.9)
RBC # BLD: 2.04 M/UL (ref 4.2–5.9)
RBC # BLD: 2.18 M/UL (ref 4.2–5.9)
RBC # BLD: 2.2 M/UL (ref 4.2–5.9)
RBC # BLD: 2.22 M/UL (ref 4.2–5.9)
RBC # BLD: 2.25 M/UL (ref 4.2–5.9)
RBC # BLD: 2.27 M/UL (ref 4.2–5.9)
RBC # BLD: 2.32 M/UL (ref 4.2–5.9)
RBC # BLD: 2.36 M/UL (ref 4.2–5.9)
RBC # BLD: 2.37 M/UL (ref 4.2–5.9)
RBC # BLD: 2.37 M/UL (ref 4.2–5.9)
RBC # BLD: 2.39 M/UL (ref 4.2–5.9)
RBC # BLD: 2.4 M/UL (ref 4.2–5.9)
RBC # BLD: 2.41 M/UL (ref 4.2–5.9)
RBC # BLD: 2.43 M/UL (ref 4.2–5.9)
RBC # BLD: 2.44 M/UL (ref 4.2–5.9)
RBC # BLD: 2.44 M/UL (ref 4.2–5.9)
RBC # BLD: 2.46 M/UL (ref 4.2–5.9)
RBC # BLD: 2.51 M/UL (ref 4.2–5.9)
RBC # BLD: 2.56 M/UL (ref 4.2–5.9)
RBC # BLD: 2.58 M/UL (ref 4.2–5.9)
RBC # BLD: 2.62 M/UL (ref 4.2–5.9)
RBC # BLD: 2.64 M/UL (ref 4.2–5.9)
RBC # BLD: 2.64 M/UL (ref 4.2–5.9)
RBC # BLD: 2.68 M/UL (ref 4.2–5.9)
RBC # BLD: 2.69 M/UL (ref 4.2–5.9)
RBC # BLD: 2.72 M/UL (ref 4.2–5.9)
RBC # BLD: 2.73 M/UL (ref 4.2–5.9)
RBC # BLD: 2.79 M/UL (ref 4.2–5.9)
RBC # BLD: 2.81 M/UL (ref 4.2–5.9)
RBC # BLD: 2.82 M/UL (ref 4.2–5.9)
RBC # BLD: 2.89 M/UL (ref 4.2–5.9)
RBC # BLD: 2.96 M/UL (ref 4.2–5.9)
RBC # BLD: 3 M/UL (ref 4.2–5.9)
RBC # BLD: 3.01 M/UL (ref 4.2–5.9)
RBC # BLD: 3.06 M/UL (ref 4.2–5.9)
RBC # BLD: 3.15 M/UL (ref 4.2–5.9)
RBC # BLD: 3.22 M/UL (ref 4.2–5.9)
RBC # BLD: 3.41 M/UL (ref 4.2–5.9)
RBC # BLD: 3.61 M/UL (ref 4.2–5.9)
RBC # BLD: 3.7 M/UL (ref 4.2–5.9)
RBC # BLD: NORMAL 10*6/UL
RBC UA: ABNORMAL /HPF (ref 0–2)
RBC, BAL: 45 /CUMM
RBC, BAL: 55 /CUMM
REPORT: NORMAL
RETICULOCYTE ABSOLUTE COUNT: 0 M/UL
RETICULOCYTE COUNT PCT: 0.06 % (ref 0.5–2.18)
SEGMENTED NEUTROPHILS, BAL: 23 % (ref 5–10)
SEGMENTED NEUTROPHILS, BAL: 78 % (ref 5–10)
SICKLE CELL SCREEN: NEGATIVE
SLIDE REVIEW: ABNORMAL
SODIUM BLD-SCNC: 130 MMOL/L (ref 136–145)
SODIUM BLD-SCNC: 133 MMOL/L (ref 136–145)
SODIUM BLD-SCNC: 133 MMOL/L (ref 136–145)
SODIUM BLD-SCNC: 134 MMOL/L (ref 136–145)
SODIUM BLD-SCNC: 135 MMOL/L (ref 136–145)
SODIUM BLD-SCNC: 135 MMOL/L (ref 136–145)
SODIUM BLD-SCNC: 136 MMOL/L (ref 136–145)
SODIUM BLD-SCNC: 137 MMOL/L (ref 136–145)
SODIUM BLD-SCNC: 138 MMOL/L (ref 136–145)
SODIUM BLD-SCNC: 139 MMOL/L (ref 136–145)
SODIUM BLD-SCNC: 140 MMOL/L (ref 136–145)
SODIUM BLD-SCNC: 141 MMOL/L (ref 136–145)
SODIUM BLD-SCNC: 142 MMOL/L (ref 136–145)
SODIUM BLD-SCNC: 143 MMOL/L (ref 136–145)
SODIUM BLD-SCNC: 144 MMOL/L (ref 136–145)
SODIUM BLD-SCNC: 145 MMOL/L (ref 136–145)
SODIUM BLD-SCNC: 146 MMOL/L (ref 136–145)
SODIUM BLD-SCNC: 147 MMOL/L (ref 136–145)
SODIUM BLD-SCNC: 147 MMOL/L (ref 136–145)
SODIUM BLD-SCNC: 148 MMOL/L (ref 136–145)
SODIUM BLD-SCNC: 149 MMOL/L (ref 136–145)
SODIUM BLD-SCNC: 150 MMOL/L (ref 136–145)
SODIUM BLD-SCNC: 150 MMOL/L (ref 136–145)
SODIUM BLD-SCNC: 151 MMOL/L (ref 136–145)
SPECIFIC GRAVITY UA: 1.01
SPECIFIC GRAVITY UA: 1.02
TACROLIMUS BLOOD: 10.4 NG/ML (ref 5–20)
TACROLIMUS BLOOD: 11.4 NG/ML (ref 5–20)
TACROLIMUS BLOOD: 12.7 NG/ML (ref 5–20)
TACROLIMUS BLOOD: 12.8 NG/ML (ref 5–20)
TACROLIMUS BLOOD: 12.9 NG/ML (ref 5–20)
TACROLIMUS BLOOD: 13.5 NG/ML (ref 5–20)
TACROLIMUS BLOOD: 13.8 NG/ML (ref 5–20)
TACROLIMUS BLOOD: 15.7 NG/ML (ref 5–20)
TACROLIMUS BLOOD: 16.1 NG/ML (ref 5–20)
TACROLIMUS BLOOD: 18.3 NG/ML (ref 5–20)
TACROLIMUS BLOOD: 5 NG/ML (ref 5–20)
TACROLIMUS BLOOD: 5.3 NG/ML (ref 5–20)
TACROLIMUS BLOOD: 6.4 NG/ML (ref 5–20)
TACROLIMUS BLOOD: 6.4 NG/ML (ref 5–20)
TACROLIMUS BLOOD: 6.7 NG/ML (ref 5–20)
TACROLIMUS BLOOD: 7 NG/ML (ref 5–20)
TACROLIMUS BLOOD: 7.7 NG/ML (ref 5–20)
TACROLIMUS BLOOD: 7.7 NG/ML (ref 5–20)
TACROLIMUS BLOOD: 9.1 NG/ML (ref 5–20)
TACROLIMUS BLOOD: 9.4 NG/ML (ref 5–20)
TCO2 ARTERIAL: 16 MMOL/L
TCO2 ARTERIAL: 16 MMOL/L
TCO2 ARTERIAL: 19 MMOL/L
TCO2 ARTERIAL: 29 MMOL/L
TCO2 ARTERIAL: 30 MMOL/L
THROAT CULTURE: NORMAL
TOBRAMYCIN RANDOM DOSE AMOUNT: NORMAL
TOBRAMYCIN TROUGH: 0.8 UG/ML
TOTAL PROTEIN: 3.8 G/DL (ref 6.4–8.2)
TOTAL PROTEIN: 3.8 G/DL (ref 6.4–8.2)
TOTAL PROTEIN: 4 G/DL (ref 6.4–8.2)
TOTAL PROTEIN: 4.1 G/DL (ref 6.4–8.2)
TOTAL PROTEIN: 4.2 G/DL (ref 6.4–8.2)
TOTAL PROTEIN: 4.3 G/DL (ref 6.4–8.2)
TOTAL PROTEIN: 4.4 G/DL (ref 6.4–8.2)
TOTAL PROTEIN: 4.5 G/DL (ref 6.4–8.2)
TOTAL PROTEIN: 4.5 G/DL (ref 6.4–8.2)
TOTAL PROTEIN: 4.6 G/DL (ref 6.4–8.2)
TOTAL PROTEIN: 4.6 G/DL (ref 6.4–8.2)
TOTAL PROTEIN: 4.7 G/DL (ref 6.4–8.2)
TOTAL PROTEIN: 4.7 G/DL (ref 6.4–8.2)
TOTAL PROTEIN: 4.8 G/DL (ref 6.4–8.2)
TOTAL PROTEIN: 4.9 G/DL (ref 6.4–8.2)
TOTAL PROTEIN: 5 G/DL (ref 6.4–8.2)
TOTAL PROTEIN: 5.1 G/DL (ref 6.4–8.2)
TOTAL PROTEIN: 5.2 G/DL (ref 6.4–8.2)
TOTAL PROTEIN: 5.2 G/DL (ref 6.4–8.2)
TOTAL PROTEIN: 5.3 G/DL (ref 6.4–8.2)
TOTAL PROTEIN: 5.3 G/DL (ref 6.4–8.2)
TOTAL PROTEIN: 5.4 G/DL (ref 6.4–8.2)
TOTAL PROTEIN: 5.4 G/DL (ref 6.4–8.2)
TOTAL PROTEIN: 5.7 G/DL (ref 6.4–8.2)
TOTAL PROTEIN: 5.7 G/DL (ref 6.4–8.2)
TOTAL PROTEIN: 5.8 G/DL (ref 6.4–8.2)
TOTAL PROTEIN: 6.1 G/DL (ref 6.4–8.2)
TOTAL PROTEIN: 6.1 G/DL (ref 6.4–8.2)
TOTAL PROTEIN: 6.8 G/DL (ref 6.4–8.2)
TOTAL PROTEIN: 7.7 G/DL (ref 6.4–8.2)
TOTAL SYPHILLIS IGG/IGM: NORMAL
TOXIC GRANULATION: PRESENT
TOXOPLASMA GONDI AB IGM: <3 AU/ML
TOXOPLASMA GONDII AB IGG: <3 IU/ML
TRIGL SERPL-MCNC: 169 MG/DL (ref 0–150)
TRIGL SERPL-MCNC: 170 MG/DL (ref 0–150)
TROPONIN: 0.03 NG/ML
TROPONIN: 0.05 NG/ML
TRYPANOSOMA CRUZI IGG ANTIBODY: 0.1 IV
TRYPANOSOMA CRUZI IGM ANTIBODY: NORMAL
URIC ACID, SERUM: 2.8 MG/DL (ref 3.5–7.2)
URIC ACID, SERUM: 2.8 MG/DL (ref 3.5–7.2)
URIC ACID, SERUM: 2.9 MG/DL (ref 3.5–7.2)
URIC ACID, SERUM: 3.4 MG/DL (ref 3.5–7.2)
URIC ACID, SERUM: 3.8 MG/DL (ref 3.5–7.2)
URIC ACID, SERUM: 4 MG/DL (ref 3.5–7.2)
URIC ACID, SERUM: 4.1 MG/DL (ref 3.5–7.2)
URIC ACID, SERUM: 4.2 MG/DL (ref 3.5–7.2)
URIC ACID, SERUM: 4.4 MG/DL (ref 3.5–7.2)
URIC ACID, SERUM: 4.5 MG/DL (ref 3.5–7.2)
URIC ACID, SERUM: 4.9 MG/DL (ref 3.5–7.2)
URIC ACID, SERUM: 5 MG/DL (ref 3.5–7.2)
URIC ACID, SERUM: 5.1 MG/DL (ref 3.5–7.2)
URIC ACID, SERUM: 5.3 MG/DL (ref 3.5–7.2)
URIC ACID, SERUM: 5.4 MG/DL (ref 3.5–7.2)
URIC ACID, SERUM: 5.7 MG/DL (ref 3.5–7.2)
URIC ACID, SERUM: 5.9 MG/DL (ref 3.5–7.2)
URIC ACID, SERUM: 6.7 MG/DL (ref 3.5–7.2)
URIC ACID, SERUM: 7.3 MG/DL (ref 3.5–7.2)
URINE CULTURE, ROUTINE: ABNORMAL
URINE CULTURE, ROUTINE: ABNORMAL
URINE CULTURE, ROUTINE: NORMAL
URINE TYPE: ABNORMAL
URINE TYPE: NORMAL
UROBILINOGEN, URINE: 0.2 E.U./DL
UROBILINOGEN, URINE: 1 E.U./DL
VANCOMYCIN TROUGH: 14.6 UG/ML (ref 10–20)
VANCOMYCIN TROUGH: 14.8 UG/ML (ref 10–20)
VANCOMYCIN TROUGH: 19.1 UG/ML (ref 10–20)
VANCOMYCIN TROUGH: 21.6 UG/ML (ref 10–20)
VANCOMYCIN TROUGH: 23.7 UG/ML (ref 10–20)
VARICELLA ZOSTER AB IGM: 0.07 ISR
VRE CULTURE: ABNORMAL
VRE CULTURE: NORMAL
VZV IGG SER QL IA: 1671 IV
WBC # BLD: 0 K/UL (ref 4–11)
WBC # BLD: 0.1 K/UL (ref 4–11)
WBC # BLD: 0.2 K/UL (ref 4–11)
WBC # BLD: 0.3 K/UL (ref 4–11)
WBC # BLD: 0.4 K/UL (ref 4–11)
WBC # BLD: 0.5 K/UL (ref 4–11)
WBC # BLD: 0.6 K/UL (ref 4–11)
WBC # BLD: 0.7 K/UL (ref 4–11)
WBC # BLD: 1.1 K/UL (ref 4–11)
WBC # BLD: 1.4 K/UL (ref 4–11)
WBC # BLD: 1.4 K/UL (ref 4–11)
WBC # BLD: 1.6 K/UL (ref 4–11)
WBC # BLD: 1.7 K/UL (ref 4–11)
WBC # BLD: 12.1 K/UL (ref 4–11)
WBC # BLD: 14.1 K/UL (ref 4–11)
WBC # BLD: 2.7 K/UL (ref 4–11)
WBC # BLD: 3 K/UL (ref 4–11)
WBC # BLD: 4.8 K/UL (ref 4–11)
WBC # BLD: 6.4 K/UL (ref 4–11)
WBC # BLD: 7 K/UL (ref 4–11)
WBC # BLD: 7.3 K/UL (ref 4–11)
WBC # BLD: 9.6 K/UL (ref 4–11)
WBC UA: ABNORMAL /HPF (ref 0–5)
WBC/EPI CELLS BAL: 330 /CUMM
WBC/EPI CELLS BAL: 880 /CUMM
WEST NILE VIRUS, IGG: 0.1 IV
WEST NILE VIRUS, IGM: 0.02 IV

## 2018-01-01 PROCEDURE — 94760 N-INVAS EAR/PLS OXIMETRY 1: CPT

## 2018-01-01 PROCEDURE — 70450 CT HEAD/BRAIN W/O DYE: CPT

## 2018-01-01 PROCEDURE — 36556 INSERT NON-TUNNEL CV CATH: CPT

## 2018-01-01 PROCEDURE — 71045 X-RAY EXAM CHEST 1 VIEW: CPT

## 2018-01-01 PROCEDURE — 6360000002 HC RX W HCPCS: Performed by: INTERNAL MEDICINE

## 2018-01-01 PROCEDURE — 94640 AIRWAY INHALATION TREATMENT: CPT

## 2018-01-01 PROCEDURE — 80197 ASSAY OF TACROLIMUS: CPT

## 2018-01-01 PROCEDURE — C9113 INJ PANTOPRAZOLE SODIUM, VIA: HCPCS | Performed by: NURSE PRACTITIONER

## 2018-01-01 PROCEDURE — 97530 THERAPEUTIC ACTIVITIES: CPT

## 2018-01-01 PROCEDURE — 80200 ASSAY OF TOBRAMYCIN: CPT

## 2018-01-01 PROCEDURE — 2580000003 HC RX 258: Performed by: NURSE PRACTITIONER

## 2018-01-01 PROCEDURE — 80069 RENAL FUNCTION PANEL: CPT

## 2018-01-01 PROCEDURE — 6370000000 HC RX 637 (ALT 250 FOR IP): Performed by: NURSE PRACTITIONER

## 2018-01-01 PROCEDURE — 80053 COMPREHEN METABOLIC PANEL: CPT

## 2018-01-01 PROCEDURE — 3700000000 HC ANESTHESIA ATTENDED CARE: Performed by: SURGERY

## 2018-01-01 PROCEDURE — 83735 ASSAY OF MAGNESIUM: CPT

## 2018-01-01 PROCEDURE — 84100 ASSAY OF PHOSPHORUS: CPT

## 2018-01-01 PROCEDURE — 80202 ASSAY OF VANCOMYCIN: CPT

## 2018-01-01 PROCEDURE — 36558 INSERT TUNNELED CV CATH: CPT | Performed by: SURGERY

## 2018-01-01 PROCEDURE — 6370000000 HC RX 637 (ALT 250 FOR IP): Performed by: INTERNAL MEDICINE

## 2018-01-01 PROCEDURE — 36592 COLLECT BLOOD FROM PICC: CPT

## 2018-01-01 PROCEDURE — 90937 HEMODIALYSIS REPEATED EVAL: CPT

## 2018-01-01 PROCEDURE — 97110 THERAPEUTIC EXERCISES: CPT

## 2018-01-01 PROCEDURE — 84550 ASSAY OF BLOOD/URIC ACID: CPT

## 2018-01-01 PROCEDURE — 2060000000 HC ICU INTERMEDIATE R&B

## 2018-01-01 PROCEDURE — 6360000004 HC RX CONTRAST MEDICATION: Performed by: INTERNAL MEDICINE

## 2018-01-01 PROCEDURE — 86923 COMPATIBILITY TEST ELECTRIC: CPT

## 2018-01-01 PROCEDURE — 97166 OT EVAL MOD COMPLEX 45 MIN: CPT

## 2018-01-01 PROCEDURE — 85025 COMPLETE CBC W/AUTO DIFF WBC: CPT

## 2018-01-01 PROCEDURE — 95819 EEG AWAKE AND ASLEEP: CPT

## 2018-01-01 PROCEDURE — 83010 ASSAY OF HAPTOGLOBIN QUANT: CPT

## 2018-01-01 PROCEDURE — 2580000003 HC RX 258: Performed by: INTERNAL MEDICINE

## 2018-01-01 PROCEDURE — 80076 HEPATIC FUNCTION PANEL: CPT

## 2018-01-01 PROCEDURE — 2580000003 HC RX 258: Performed by: ANESTHESIOLOGY

## 2018-01-01 PROCEDURE — 86644 CMV ANTIBODY: CPT

## 2018-01-01 PROCEDURE — 77001 FLUOROGUIDE FOR VEIN DEVICE: CPT | Performed by: SURGERY

## 2018-01-01 PROCEDURE — 83605 ASSAY OF LACTIC ACID: CPT

## 2018-01-01 PROCEDURE — 6360000002 HC RX W HCPCS: Performed by: ANESTHESIOLOGY

## 2018-01-01 PROCEDURE — 85730 THROMBOPLASTIN TIME PARTIAL: CPT

## 2018-01-01 PROCEDURE — 6360000002 HC RX W HCPCS: Performed by: NURSE PRACTITIONER

## 2018-01-01 PROCEDURE — 83615 LACTATE (LD) (LDH) ENZYME: CPT

## 2018-01-01 PROCEDURE — 99211 OFF/OP EST MAY X REQ PHY/QHP: CPT

## 2018-01-01 PROCEDURE — 2000000000 HC ICU R&B

## 2018-01-01 PROCEDURE — 87799 DETECT AGENT NOS DNA QUANT: CPT

## 2018-01-01 PROCEDURE — 93970 EXTREMITY STUDY: CPT

## 2018-01-01 PROCEDURE — 85379 FIBRIN DEGRADATION QUANT: CPT

## 2018-01-01 PROCEDURE — 3600000002 HC SURGERY LEVEL 2 BASE: Performed by: SURGERY

## 2018-01-01 PROCEDURE — 2500000003 HC RX 250 WO HCPCS: Performed by: INTERNAL MEDICINE

## 2018-01-01 PROCEDURE — 82947 ASSAY GLUCOSE BLOOD QUANT: CPT

## 2018-01-01 PROCEDURE — 74018 RADEX ABDOMEN 1 VIEW: CPT

## 2018-01-01 PROCEDURE — 2700000000 HC OXYGEN THERAPY PER DAY

## 2018-01-01 PROCEDURE — 87390 HIV-1 AG IA: CPT

## 2018-01-01 PROCEDURE — 6360000002 HC RX W HCPCS: Performed by: RADIOLOGY

## 2018-01-01 PROCEDURE — 76705 ECHO EXAM OF ABDOMEN: CPT

## 2018-01-01 PROCEDURE — 80048 BASIC METABOLIC PNL TOTAL CA: CPT

## 2018-01-01 PROCEDURE — 94003 VENT MGMT INPAT SUBQ DAY: CPT

## 2018-01-01 PROCEDURE — 85049 AUTOMATED PLATELET COUNT: CPT

## 2018-01-01 PROCEDURE — P9037 PLATE PHERES LEUKOREDU IRRAD: HCPCS

## 2018-01-01 PROCEDURE — 86645 CMV ANTIBODY IGM: CPT

## 2018-01-01 PROCEDURE — 51798 US URINE CAPACITY MEASURE: CPT

## 2018-01-01 PROCEDURE — 87633 RESP VIRUS 12-25 TARGETS: CPT

## 2018-01-01 PROCEDURE — 86706 HEP B SURFACE ANTIBODY: CPT

## 2018-01-01 PROCEDURE — 2580000003 HC RX 258

## 2018-01-01 PROCEDURE — P9047 ALBUMIN (HUMAN), 25%, 50ML: HCPCS | Performed by: INTERNAL MEDICINE

## 2018-01-01 PROCEDURE — P9040 RBC LEUKOREDUCED IRRADIATED: HCPCS

## 2018-01-01 PROCEDURE — G8987 SELF CARE CURRENT STATUS: HCPCS

## 2018-01-01 PROCEDURE — 81003 URINALYSIS AUTO W/O SCOPE: CPT

## 2018-01-01 PROCEDURE — 87081 CULTURE SCREEN ONLY: CPT

## 2018-01-01 PROCEDURE — 0B9F8ZX DRAINAGE OF RIGHT LOWER LUNG LOBE, VIA NATURAL OR ARTIFICIAL OPENING ENDOSCOPIC, DIAGNOSTIC: ICD-10-PCS | Performed by: INTERNAL MEDICINE

## 2018-01-01 PROCEDURE — 6360000002 HC RX W HCPCS: Performed by: STUDENT IN AN ORGANIZED HEALTH CARE EDUCATION/TRAINING PROGRAM

## 2018-01-01 PROCEDURE — G8988 SELF CARE GOAL STATUS: HCPCS

## 2018-01-01 PROCEDURE — 99254 IP/OBS CNSLTJ NEW/EST MOD 60: CPT | Performed by: INTERNAL MEDICINE

## 2018-01-01 PROCEDURE — 74176 CT ABD & PELVIS W/O CONTRAST: CPT

## 2018-01-01 PROCEDURE — 82803 BLOOD GASES ANY COMBINATION: CPT

## 2018-01-01 PROCEDURE — 87103 BLOOD FUNGUS CULTURE: CPT

## 2018-01-01 PROCEDURE — 2500000003 HC RX 250 WO HCPCS

## 2018-01-01 PROCEDURE — 5A1D70Z PERFORMANCE OF URINARY FILTRATION, INTERMITTENT, LESS THAN 6 HOURS PER DAY: ICD-10-PCS | Performed by: INTERNAL MEDICINE

## 2018-01-01 PROCEDURE — 97535 SELF CARE MNGMENT TRAINING: CPT

## 2018-01-01 PROCEDURE — 8150000000 HC NATIONAL MARROW DONOR PROGRAM

## 2018-01-01 PROCEDURE — 86777 TOXOPLASMA ANTIBODY: CPT

## 2018-01-01 PROCEDURE — C1752 CATH,HEMODIALYSIS,SHORT-TERM: HCPCS

## 2018-01-01 PROCEDURE — 85384 FIBRINOGEN ACTIVITY: CPT

## 2018-01-01 PROCEDURE — 93005 ELECTROCARDIOGRAM TRACING: CPT | Performed by: STUDENT IN AN ORGANIZED HEALTH CARE EDUCATION/TRAINING PROGRAM

## 2018-01-01 PROCEDURE — 86900 BLOOD TYPING SEROLOGIC ABO: CPT

## 2018-01-01 PROCEDURE — 7100000000 HC PACU RECOVERY - FIRST 15 MIN: Performed by: SURGERY

## 2018-01-01 PROCEDURE — 36415 COLL VENOUS BLD VENIPUNCTURE: CPT

## 2018-01-01 PROCEDURE — 87070 CULTURE OTHR SPECIMN AEROBIC: CPT

## 2018-01-01 PROCEDURE — 2500000003 HC RX 250 WO HCPCS: Performed by: NURSE PRACTITIONER

## 2018-01-01 PROCEDURE — 02HV33Z INSERTION OF INFUSION DEVICE INTO SUPERIOR VENA CAVA, PERCUTANEOUS APPROACH: ICD-10-PCS | Performed by: SURGERY

## 2018-01-01 PROCEDURE — 88112 CYTOPATH CELL ENHANCE TECH: CPT

## 2018-01-01 PROCEDURE — 6360000002 HC RX W HCPCS

## 2018-01-01 PROCEDURE — 2500000003 HC RX 250 WO HCPCS: Performed by: SURGERY

## 2018-01-01 PROCEDURE — 85045 AUTOMATED RETICULOCYTE COUNT: CPT

## 2018-01-01 PROCEDURE — 97165 OT EVAL LOW COMPLEX 30 MIN: CPT

## 2018-01-01 PROCEDURE — 87205 SMEAR GRAM STAIN: CPT

## 2018-01-01 PROCEDURE — 81378 HLA I & II TYPING HR: CPT

## 2018-01-01 PROCEDURE — 77001 FLUOROGUIDE FOR VEIN DEVICE: CPT

## 2018-01-01 PROCEDURE — 36430 TRANSFUSION BLD/BLD COMPNT: CPT

## 2018-01-01 PROCEDURE — 82248 BILIRUBIN DIRECT: CPT

## 2018-01-01 PROCEDURE — 87102 FUNGUS ISOLATION CULTURE: CPT

## 2018-01-01 PROCEDURE — 87254 VIRUS INOCULATION SHELL VIA: CPT

## 2018-01-01 PROCEDURE — 87541 LEGION PNEUMO DNA AMP PROB: CPT

## 2018-01-01 PROCEDURE — 2709999900 HC NON-CHARGEABLE SUPPLY: Performed by: SURGERY

## 2018-01-01 PROCEDURE — 87253 VIRUS INOCULATE TISSUE ADDL: CPT

## 2018-01-01 PROCEDURE — 94664 DEMO&/EVAL PT USE INHALER: CPT

## 2018-01-01 PROCEDURE — 87305 ASPERGILLUS AG IA: CPT

## 2018-01-01 PROCEDURE — G8997 SWALLOW GOAL STATUS: HCPCS

## 2018-01-01 PROCEDURE — 86694 HERPES SIMPLEX NES ANTBDY: CPT

## 2018-01-01 PROCEDURE — 93005 ELECTROCARDIOGRAM TRACING: CPT | Performed by: NURSE PRACTITIONER

## 2018-01-01 PROCEDURE — 93975 VASCULAR STUDY: CPT

## 2018-01-01 PROCEDURE — 36600 WITHDRAWAL OF ARTERIAL BLOOD: CPT

## 2018-01-01 PROCEDURE — 51702 INSERT TEMP BLADDER CATH: CPT

## 2018-01-01 PROCEDURE — G8979 MOBILITY GOAL STATUS: HCPCS

## 2018-01-01 PROCEDURE — 3700000001 HC ADD 15 MINUTES (ANESTHESIA): Performed by: SURGERY

## 2018-01-01 PROCEDURE — 7100000001 HC PACU RECOVERY - ADDTL 15 MIN: Performed by: SURGERY

## 2018-01-01 PROCEDURE — 86901 BLOOD TYPING SEROLOGIC RH(D): CPT

## 2018-01-01 PROCEDURE — 87533 HHV-6 DNA QUANT: CPT

## 2018-01-01 PROCEDURE — 84145 PROCALCITONIN (PCT): CPT

## 2018-01-01 PROCEDURE — 94761 N-INVAS EAR/PLS OXIMETRY MLT: CPT

## 2018-01-01 PROCEDURE — 70486 CT MAXILLOFACIAL W/O DYE: CPT

## 2018-01-01 PROCEDURE — 81001 URINALYSIS AUTO W/SCOPE: CPT

## 2018-01-01 PROCEDURE — 99223 1ST HOSP IP/OBS HIGH 75: CPT | Performed by: INTERNAL MEDICINE

## 2018-01-01 PROCEDURE — 2709999900 HC NON-CHARGEABLE SUPPLY

## 2018-01-01 PROCEDURE — 82140 ASSAY OF AMMONIA: CPT

## 2018-01-01 PROCEDURE — 88305 TISSUE EXAM BY PATHOLOGIST: CPT

## 2018-01-01 PROCEDURE — 88313 SPECIAL STAINS GROUP 2: CPT

## 2018-01-01 PROCEDURE — 94010 BREATHING CAPACITY TEST: CPT

## 2018-01-01 PROCEDURE — 87015 SPECIMEN INFECT AGNT CONCNTJ: CPT

## 2018-01-01 PROCEDURE — 97116 GAIT TRAINING THERAPY: CPT

## 2018-01-01 PROCEDURE — 85610 PROTHROMBIN TIME: CPT

## 2018-01-01 PROCEDURE — 87086 URINE CULTURE/COLONY COUNT: CPT

## 2018-01-01 PROCEDURE — 86787 VARICELLA-ZOSTER ANTIBODY: CPT

## 2018-01-01 PROCEDURE — 87040 BLOOD CULTURE FOR BACTERIA: CPT

## 2018-01-01 PROCEDURE — 76700 US EXAM ABDOM COMPLETE: CPT

## 2018-01-01 PROCEDURE — 71250 CT THORAX DX C-: CPT

## 2018-01-01 PROCEDURE — 84295 ASSAY OF SERUM SODIUM: CPT

## 2018-01-01 PROCEDURE — 96417 CHEMO IV INFUS EACH ADDL SEQ: CPT

## 2018-01-01 PROCEDURE — 87116 MYCOBACTERIA CULTURE: CPT

## 2018-01-01 PROCEDURE — 83880 ASSAY OF NATRIURETIC PEPTIDE: CPT

## 2018-01-01 PROCEDURE — 96413 CHEMO IV INFUSION 1 HR: CPT

## 2018-01-01 PROCEDURE — 87449 NOS EACH ORGANISM AG IA: CPT

## 2018-01-01 PROCEDURE — 0JPT0WZ REMOVAL OF TOTALLY IMPLANTABLE VASCULAR ACCESS DEVICE FROM TRUNK SUBCUTANEOUS TISSUE AND FASCIA, OPEN APPROACH: ICD-10-PCS | Performed by: SURGERY

## 2018-01-01 PROCEDURE — C1769 GUIDE WIRE: HCPCS

## 2018-01-01 PROCEDURE — 86790 VIRUS ANTIBODY NOS: CPT

## 2018-01-01 PROCEDURE — 87186 SC STD MICRODIL/AGAR DIL: CPT

## 2018-01-01 PROCEDURE — 87581 M.PNEUMON DNA AMP PROBE: CPT

## 2018-01-01 PROCEDURE — 94667 MNPJ CHEST WALL 1ST: CPT

## 2018-01-01 PROCEDURE — 2709999900 HC NON-CHARGEABLE SUPPLY: Performed by: INTERNAL MEDICINE

## 2018-01-01 PROCEDURE — 97161 PT EVAL LOW COMPLEX 20 MIN: CPT

## 2018-01-01 PROCEDURE — 0BCB8ZZ EXTIRPATION OF MATTER FROM LEFT LOWER LOBE BRONCHUS, VIA NATURAL OR ARTIFICIAL OPENING ENDOSCOPIC: ICD-10-PCS | Performed by: INTERNAL MEDICINE

## 2018-01-01 PROCEDURE — 87252 VIRUS INOCULATION TISSUE: CPT

## 2018-01-01 PROCEDURE — 86665 EPSTEIN-BARR CAPSID VCA: CPT

## 2018-01-01 PROCEDURE — 86701 HIV-1ANTIBODY: CPT

## 2018-01-01 PROCEDURE — 94750 HC PULMONARY COMPLIANCE STUDY: CPT

## 2018-01-01 PROCEDURE — 78582 LUNG VENTILAT&PERFUS IMAGING: CPT

## 2018-01-01 PROCEDURE — 88312 SPECIAL STAINS GROUP 1: CPT

## 2018-01-01 PROCEDURE — 86850 RBC ANTIBODY SCREEN: CPT

## 2018-01-01 PROCEDURE — 3430000000 HC RX DIAGNOSTIC RADIOPHARMACEUTICAL: Performed by: INTERNAL MEDICINE

## 2018-01-01 PROCEDURE — 76937 US GUIDE VASCULAR ACCESS: CPT

## 2018-01-01 PROCEDURE — 99152 MOD SED SAME PHYS/QHP 5/>YRS: CPT | Performed by: INTERNAL MEDICINE

## 2018-01-01 PROCEDURE — 99233 SBSQ HOSP IP/OBS HIGH 50: CPT | Performed by: NURSE PRACTITIONER

## 2018-01-01 PROCEDURE — 87801 DETECT AGNT MULT DNA AMPLI: CPT

## 2018-01-01 PROCEDURE — 90945 DIALYSIS ONE EVALUATION: CPT

## 2018-01-01 PROCEDURE — 82043 UR ALBUMIN QUANTITATIVE: CPT

## 2018-01-01 PROCEDURE — 3600000012 HC SURGERY LEVEL 2 ADDTL 15MIN: Performed by: SURGERY

## 2018-01-01 PROCEDURE — 99291 CRITICAL CARE FIRST HOUR: CPT | Performed by: INTERNAL MEDICINE

## 2018-01-01 PROCEDURE — 31645 BRNCHSC W/THER ASPIR 1ST: CPT | Performed by: INTERNAL MEDICINE

## 2018-01-01 PROCEDURE — 82728 ASSAY OF FERRITIN: CPT

## 2018-01-01 PROCEDURE — 94150 VITAL CAPACITY TEST: CPT

## 2018-01-01 PROCEDURE — 93005 ELECTROCARDIOGRAM TRACING: CPT | Performed by: INTERNAL MEDICINE

## 2018-01-01 PROCEDURE — 84478 ASSAY OF TRIGLYCERIDES: CPT

## 2018-01-01 PROCEDURE — 81265 STR MARKERS SPECIMEN ANAL: CPT

## 2018-01-01 PROCEDURE — G8980 MOBILITY D/C STATUS: HCPCS

## 2018-01-01 PROCEDURE — 3E04305 INTRODUCTION OF OTHER ANTINEOPLASTIC INTO CENTRAL VEIN, PERCUTANEOUS APPROACH: ICD-10-PCS | Performed by: INTERNAL MEDICINE

## 2018-01-01 PROCEDURE — A9541 TC99M SULFUR COLLOID: HCPCS | Performed by: INTERNAL MEDICINE

## 2018-01-01 PROCEDURE — 86807 CYTOTOXIC ANTIBODY SCREENING: CPT

## 2018-01-01 PROCEDURE — 86753 PROTOZOA ANTIBODY NOS: CPT

## 2018-01-01 PROCEDURE — 84484 ASSAY OF TROPONIN QUANT: CPT

## 2018-01-01 PROCEDURE — 85027 COMPLETE CBC AUTOMATED: CPT

## 2018-01-01 PROCEDURE — 5A1945Z RESPIRATORY VENTILATION, 24-96 CONSECUTIVE HOURS: ICD-10-PCS | Performed by: STUDENT IN AN ORGANIZED HEALTH CARE EDUCATION/TRAINING PROGRAM

## 2018-01-01 PROCEDURE — 81267 CHIMERISM ANAL NO CELL SELEC: CPT

## 2018-01-01 PROCEDURE — C1894 INTRO/SHEATH, NON-LASER: HCPCS

## 2018-01-01 PROCEDURE — 36591 DRAW BLOOD OFF VENOUS DEVICE: CPT

## 2018-01-01 PROCEDURE — 71275 CT ANGIOGRAPHY CHEST: CPT

## 2018-01-01 PROCEDURE — 36590 REMOVAL TUNNELED CV CATH: CPT | Performed by: SURGERY

## 2018-01-01 PROCEDURE — 86880 COOMBS TEST DIRECT: CPT

## 2018-01-01 PROCEDURE — 86709 HEPATITIS A IGM ANTIBODY: CPT

## 2018-01-01 PROCEDURE — 90935 HEMODIALYSIS ONE EVALUATION: CPT

## 2018-01-01 PROCEDURE — 82247 BILIRUBIN TOTAL: CPT

## 2018-01-01 PROCEDURE — 97162 PT EVAL MOD COMPLEX 30 MIN: CPT

## 2018-01-01 PROCEDURE — 78215 LVR&SPLEEN IMG STATIC ONLY: CPT

## 2018-01-01 PROCEDURE — 87798 DETECT AGENT NOS DNA AMP: CPT

## 2018-01-01 PROCEDURE — 31500 INSERT EMERGENCY AIRWAY: CPT | Performed by: INTERNAL MEDICINE

## 2018-01-01 PROCEDURE — 84132 ASSAY OF SERUM POTASSIUM: CPT

## 2018-01-01 PROCEDURE — 94729 DIFFUSING CAPACITY: CPT

## 2018-01-01 PROCEDURE — 94002 VENT MGMT INPAT INIT DAY: CPT

## 2018-01-01 PROCEDURE — 87486 CHLMYD PNEUM DNA AMP PROBE: CPT

## 2018-01-01 PROCEDURE — 86803 HEPATITIS C AB TEST: CPT

## 2018-01-01 PROCEDURE — 93010 ELECTROCARDIOGRAM REPORT: CPT | Performed by: INTERNAL MEDICINE

## 2018-01-01 PROCEDURE — 87340 HEPATITIS B SURFACE AG IA: CPT

## 2018-01-01 PROCEDURE — 88342 IMHCHEM/IMCYTCHM 1ST ANTB: CPT

## 2018-01-01 PROCEDURE — 88311 DECALCIFY TISSUE: CPT

## 2018-01-01 PROCEDURE — 87641 MR-STAPH DNA AMP PROBE: CPT

## 2018-01-01 PROCEDURE — A9558 XE133 XENON 10MCI: HCPCS | Performed by: INTERNAL MEDICINE

## 2018-01-01 PROCEDURE — 89051 BODY FLUID CELL COUNT: CPT

## 2018-01-01 PROCEDURE — G8978 MOBILITY CURRENT STATUS: HCPCS

## 2018-01-01 PROCEDURE — 07DR3ZX EXTRACTION OF ILIAC BONE MARROW, PERCUTANEOUS APPROACH, DIAGNOSTIC: ICD-10-PCS | Performed by: INTERNAL MEDICINE

## 2018-01-01 PROCEDURE — 93306 TTE W/DOPPLER COMPLETE: CPT

## 2018-01-01 PROCEDURE — 86788 WEST NILE VIRUS AB IGM: CPT

## 2018-01-01 PROCEDURE — 31624 DX BRONCHOSCOPE/LAVAGE: CPT | Performed by: INTERNAL MEDICINE

## 2018-01-01 PROCEDURE — C8929 TTE W OR WO FOL WCON,DOPPLER: HCPCS

## 2018-01-01 PROCEDURE — 30243Y3 TRANSFUSION OF ALLOGENEIC UNRELATED HEMATOPOIETIC STEM CELLS INTO CENTRAL VEIN, PERCUTANEOUS APPROACH: ICD-10-PCS | Performed by: INTERNAL MEDICINE

## 2018-01-01 PROCEDURE — 0BH17EZ INSERTION OF ENDOTRACHEAL AIRWAY INTO TRACHEA, VIA NATURAL OR ARTIFICIAL OPENING: ICD-10-PCS | Performed by: STUDENT IN AN ORGANIZED HEALTH CARE EDUCATION/TRAINING PROGRAM

## 2018-01-01 PROCEDURE — 86704 HEP B CORE ANTIBODY TOTAL: CPT

## 2018-01-01 PROCEDURE — 92610 EVALUATE SWALLOWING FUNCTION: CPT

## 2018-01-01 PROCEDURE — 87522 HEPATITIS C REVRS TRNSCRPJ: CPT

## 2018-01-01 PROCEDURE — 99233 SBSQ HOSP IP/OBS HIGH 50: CPT | Performed by: INTERNAL MEDICINE

## 2018-01-01 PROCEDURE — 3609010900 HC BRONCHOSCOPY THERAPUTIC ASPIRATION INITIAL: Performed by: INTERNAL MEDICINE

## 2018-01-01 PROCEDURE — 86789 WEST NILE VIRUS ANTIBODY: CPT

## 2018-01-01 PROCEDURE — 86702 HIV-2 ANTIBODY: CPT

## 2018-01-01 PROCEDURE — 87206 SMEAR FLUORESCENT/ACID STAI: CPT

## 2018-01-01 PROCEDURE — 94726 PLETHYSMOGRAPHY LUNG VOLUMES: CPT

## 2018-01-01 PROCEDURE — 82330 ASSAY OF CALCIUM: CPT

## 2018-01-01 PROCEDURE — G8989 SELF CARE D/C STATUS: HCPCS

## 2018-01-01 PROCEDURE — 82570 ASSAY OF URINE CREATININE: CPT

## 2018-01-01 PROCEDURE — 80074 ACUTE HEPATITIS PANEL: CPT

## 2018-01-01 PROCEDURE — 94799 UNLISTED PULMONARY SVC/PX: CPT

## 2018-01-01 PROCEDURE — APPSS45 APP SPLIT SHARED TIME 31-45 MINUTES: Performed by: NURSE PRACTITIONER

## 2018-01-01 PROCEDURE — G8996 SWALLOW CURRENT STATUS: HCPCS

## 2018-01-01 PROCEDURE — 99203 OFFICE O/P NEW LOW 30 MIN: CPT

## 2018-01-01 PROCEDURE — 3609010800 HC BRONCHOSCOPY ALVEOLAR LAVAGE: Performed by: INTERNAL MEDICINE

## 2018-01-01 PROCEDURE — 87535 HIV-1 PROBE&REVERSE TRNSCRPJ: CPT

## 2018-01-01 PROCEDURE — 71046 X-RAY EXAM CHEST 2 VIEWS: CPT

## 2018-01-01 PROCEDURE — 87077 CULTURE AEROBIC IDENTIFY: CPT

## 2018-01-01 PROCEDURE — 85660 RBC SICKLE CELL TEST: CPT

## 2018-01-01 PROCEDURE — 6360000002 HC RX W HCPCS: Performed by: SURGERY

## 2018-01-01 PROCEDURE — 96409 CHEMO IV PUSH SNGL DRUG: CPT

## 2018-01-01 PROCEDURE — 86780 TREPONEMA PALLIDUM: CPT

## 2018-01-01 PROCEDURE — 85397 CLOTTING FUNCT ACTIVITY: CPT

## 2018-01-01 PROCEDURE — 86778 TOXOPLASMA ANTIBODY IGM: CPT

## 2018-01-01 PROCEDURE — 0BC68ZZ EXTIRPATION OF MATTER FROM RIGHT LOWER LOBE BRONCHUS, VIA NATURAL OR ARTIFICIAL OPENING ENDOSCOPIC: ICD-10-PCS | Performed by: INTERNAL MEDICINE

## 2018-01-01 PROCEDURE — 92526 ORAL FUNCTION THERAPY: CPT

## 2018-01-01 PROCEDURE — 94770 HC ETCO2 MONITOR DAILY: CPT

## 2018-01-01 PROCEDURE — A9540 TC99M MAA: HCPCS | Performed by: INTERNAL MEDICINE

## 2018-01-01 PROCEDURE — 87324 CLOSTRIDIUM AG IA: CPT

## 2018-01-01 PROCEDURE — 86707 HEPATITIS BE ANTIBODY: CPT

## 2018-01-01 PROCEDURE — 70551 MRI BRAIN STEM W/O DYE: CPT

## 2018-01-01 RX ORDER — TACROLIMUS 0.5 MG/1
0.5 CAPSULE ORAL EVERY MORNING
Status: DISCONTINUED | OUTPATIENT
Start: 2018-01-01 | End: 2018-01-01 | Stop reason: ALTCHOICE

## 2018-01-01 RX ORDER — TACROLIMUS 1 MG/1
CAPSULE ORAL
Qty: 120 CAPSULE | Refills: 3 | Status: SHIPPED | OUTPATIENT
Start: 2018-01-01

## 2018-01-01 RX ORDER — 0.9 % SODIUM CHLORIDE 0.9 %
250 INTRAVENOUS SOLUTION INTRAVENOUS ONCE
Status: DISCONTINUED | OUTPATIENT
Start: 2018-01-01 | End: 2018-01-01

## 2018-01-01 RX ORDER — PROMETHAZINE HYDROCHLORIDE 25 MG/ML
6.25 INJECTION, SOLUTION INTRAMUSCULAR; INTRAVENOUS
Status: DISCONTINUED | OUTPATIENT
Start: 2018-01-01 | End: 2018-01-01 | Stop reason: HOSPADM

## 2018-01-01 RX ORDER — PANTOPRAZOLE SODIUM 40 MG/1
40 TABLET, DELAYED RELEASE ORAL
Status: DISCONTINUED | OUTPATIENT
Start: 2018-01-01 | End: 2018-01-01

## 2018-01-01 RX ORDER — 0.9 % SODIUM CHLORIDE 0.9 %
250 INTRAVENOUS SOLUTION INTRAVENOUS ONCE
Status: COMPLETED | OUTPATIENT
Start: 2018-01-01 | End: 2018-01-01

## 2018-01-01 RX ORDER — DIPHENHYDRAMINE HYDROCHLORIDE 50 MG/ML
12.5 INJECTION INTRAMUSCULAR; INTRAVENOUS
Status: DISCONTINUED | OUTPATIENT
Start: 2018-01-01 | End: 2018-01-01 | Stop reason: HOSPADM

## 2018-01-01 RX ORDER — DEXTROSE MONOHYDRATE 50 MG/ML
INJECTION, SOLUTION INTRAVENOUS CONTINUOUS
Status: DISCONTINUED | OUTPATIENT
Start: 2018-01-01 | End: 2018-01-01 | Stop reason: SDUPTHER

## 2018-01-01 RX ORDER — SODIUM CHLORIDE 9 MG/ML
INJECTION, SOLUTION INTRAVENOUS
Status: COMPLETED
Start: 2018-01-01 | End: 2018-01-01

## 2018-01-01 RX ORDER — SODIUM CHLORIDE 9 MG/ML
INJECTION, SOLUTION INTRAVENOUS CONTINUOUS
Status: DISCONTINUED | OUTPATIENT
Start: 2018-01-01 | End: 2018-01-01 | Stop reason: DRUGHIGH

## 2018-01-01 RX ORDER — HALOPERIDOL 5 MG/ML
5 INJECTION INTRAMUSCULAR ONCE
Status: DISCONTINUED | OUTPATIENT
Start: 2018-01-01 | End: 2018-01-01

## 2018-01-01 RX ORDER — DIPHENHYDRAMINE HCL 25 MG
25 TABLET ORAL PRN
Status: DISCONTINUED | OUTPATIENT
Start: 2018-01-01 | End: 2018-01-01

## 2018-01-01 RX ORDER — LORAZEPAM 2 MG/ML
1 INJECTION INTRAMUSCULAR
Status: DISCONTINUED | OUTPATIENT
Start: 2018-01-01 | End: 2018-01-01 | Stop reason: HOSPADM

## 2018-01-01 RX ORDER — METHOTREXATE 25 MG/ML
30 INJECTION, SOLUTION INTRA-ARTERIAL; INTRAMUSCULAR; INTRAVENOUS ONCE
Status: COMPLETED | OUTPATIENT
Start: 2018-01-01 | End: 2018-01-01

## 2018-01-01 RX ORDER — OXYCODONE HYDROCHLORIDE 5 MG/1
10 TABLET ORAL PRN
Status: DISCONTINUED | OUTPATIENT
Start: 2018-01-01 | End: 2018-01-01 | Stop reason: HOSPADM

## 2018-01-01 RX ORDER — DIMETHICONE, CAMPHOR (SYNTHETIC), MENTHOL, AND PHENOL 1.1; .5; .625; .5 G/100G; G/100G; G/100G; G/100G
OINTMENT TOPICAL PRN
Status: DISCONTINUED | OUTPATIENT
Start: 2018-01-01 | End: 2018-01-01 | Stop reason: HOSPADM

## 2018-01-01 RX ORDER — DIPHENHYDRAMINE HCL 25 MG
25 TABLET ORAL ONCE
Status: COMPLETED | OUTPATIENT
Start: 2018-01-01 | End: 2018-01-01

## 2018-01-01 RX ORDER — PROPOFOL 10 MG/ML
20 INJECTION, EMULSION INTRAVENOUS ONCE
Status: COMPLETED | OUTPATIENT
Start: 2018-01-01 | End: 2018-01-01

## 2018-01-01 RX ORDER — LOPERAMIDE HYDROCHLORIDE 2 MG/1
4 CAPSULE ORAL ONCE
Status: COMPLETED | OUTPATIENT
Start: 2018-01-01 | End: 2018-01-01

## 2018-01-01 RX ORDER — LABETALOL HYDROCHLORIDE 5 MG/ML
5 INJECTION, SOLUTION INTRAVENOUS EVERY 10 MIN PRN
Status: DISCONTINUED | OUTPATIENT
Start: 2018-01-01 | End: 2018-01-01 | Stop reason: HOSPADM

## 2018-01-01 RX ORDER — HALOPERIDOL 5 MG/ML
0.5 INJECTION INTRAMUSCULAR EVERY 6 HOURS PRN
Status: DISCONTINUED | OUTPATIENT
Start: 2018-01-01 | End: 2018-01-01 | Stop reason: HOSPADM

## 2018-01-01 RX ORDER — METHOTREXATE 25 MG/ML
20 INJECTION, SOLUTION INTRA-ARTERIAL; INTRAMUSCULAR; INTRAVENOUS ONCE
Status: COMPLETED | OUTPATIENT
Start: 2018-01-01 | End: 2018-01-01

## 2018-01-01 RX ORDER — POTASSIUM CHLORIDE 29.8 MG/ML
20 INJECTION INTRAVENOUS PRN
Status: DISCONTINUED | OUTPATIENT
Start: 2018-01-01 | End: 2018-01-01 | Stop reason: HOSPADM

## 2018-01-01 RX ORDER — SODIUM CHLORIDE, SODIUM LACTATE, POTASSIUM CHLORIDE, CALCIUM CHLORIDE 600; 310; 30; 20 MG/100ML; MG/100ML; MG/100ML; MG/100ML
INJECTION, SOLUTION INTRAVENOUS CONTINUOUS
Status: DISCONTINUED | OUTPATIENT
Start: 2018-01-01 | End: 2018-01-01

## 2018-01-01 RX ORDER — ONDANSETRON HYDROCHLORIDE 8 MG/1
8 TABLET, FILM COATED ORAL EVERY 8 HOURS PRN
Status: DISCONTINUED | OUTPATIENT
Start: 2018-01-01 | End: 2018-01-01 | Stop reason: HOSPADM

## 2018-01-01 RX ORDER — MORPHINE SULFATE 2 MG/ML
2 INJECTION, SOLUTION INTRAMUSCULAR; INTRAVENOUS
Status: DISCONTINUED | OUTPATIENT
Start: 2018-01-01 | End: 2018-01-01 | Stop reason: HOSPADM

## 2018-01-01 RX ORDER — HEPARIN SODIUM (PORCINE) LOCK FLUSH IV SOLN 100 UNIT/ML 100 UNIT/ML
100 SOLUTION INTRAVENOUS PRN
Status: DISCONTINUED | OUTPATIENT
Start: 2018-01-01 | End: 2018-01-01 | Stop reason: HOSPADM

## 2018-01-01 RX ORDER — CETIRIZINE HYDROCHLORIDE 10 MG/1
10 TABLET ORAL DAILY PRN
Status: DISCONTINUED | OUTPATIENT
Start: 2018-01-01 | End: 2018-01-01 | Stop reason: HOSPADM

## 2018-01-01 RX ORDER — METOCLOPRAMIDE HYDROCHLORIDE 5 MG/ML
10 INJECTION INTRAMUSCULAR; INTRAVENOUS
Status: DISCONTINUED | OUTPATIENT
Start: 2018-01-01 | End: 2018-01-01 | Stop reason: HOSPADM

## 2018-01-01 RX ORDER — 0.9 % SODIUM CHLORIDE 0.9 %
10 VIAL (ML) INJECTION PRN
Status: DISCONTINUED | OUTPATIENT
Start: 2018-01-01 | End: 2018-01-01 | Stop reason: HOSPADM

## 2018-01-01 RX ORDER — OXYCODONE HYDROCHLORIDE 5 MG/1
2.5 TABLET ORAL EVERY 4 HOURS PRN
Status: DISCONTINUED | OUTPATIENT
Start: 2018-01-01 | End: 2018-01-01

## 2018-01-01 RX ORDER — BACLOFEN 10 MG/1
5 TABLET ORAL 3 TIMES DAILY
Status: DISCONTINUED | OUTPATIENT
Start: 2018-01-01 | End: 2018-01-01

## 2018-01-01 RX ORDER — URSODIOL 250 MG/1
250 TABLET, FILM COATED ORAL
COMMUNITY

## 2018-01-01 RX ORDER — SULFAMETHOXAZOLE AND TRIMETHOPRIM 800; 160 MG/1; MG/1
1 TABLET ORAL 2 TIMES DAILY
Status: COMPLETED | OUTPATIENT
Start: 2018-01-01 | End: 2018-01-01

## 2018-01-01 RX ORDER — PROPOFOL 10 MG/ML
INJECTION, EMULSION INTRAVENOUS
Status: DISPENSED
Start: 2018-01-01 | End: 2018-01-01

## 2018-01-01 RX ORDER — FUROSEMIDE 10 MG/ML
20 INJECTION INTRAMUSCULAR; INTRAVENOUS ONCE
Status: COMPLETED | OUTPATIENT
Start: 2018-01-01 | End: 2018-01-01

## 2018-01-01 RX ORDER — MAGNESIUM HYDROXIDE/ALUMINUM HYDROXICE/SIMETHICONE 120; 1200; 1200 MG/30ML; MG/30ML; MG/30ML
30 SUSPENSION ORAL EVERY 6 HOURS PRN
Status: DISCONTINUED | OUTPATIENT
Start: 2018-01-01 | End: 2018-01-01 | Stop reason: HOSPADM

## 2018-01-01 RX ORDER — ONDANSETRON HYDROCHLORIDE 8 MG/1
24 TABLET, FILM COATED ORAL ONCE
Status: COMPLETED | OUTPATIENT
Start: 2018-01-01 | End: 2018-01-01

## 2018-01-01 RX ORDER — ACETAMINOPHEN 325 MG/1
650 TABLET ORAL EVERY 6 HOURS PRN
Status: DISCONTINUED | OUTPATIENT
Start: 2018-01-01 | End: 2018-01-01

## 2018-01-01 RX ORDER — SODIUM CHLORIDE 9 MG/ML
INJECTION, SOLUTION INTRAVENOUS CONTINUOUS
Status: DISCONTINUED | OUTPATIENT
Start: 2018-01-01 | End: 2018-01-01

## 2018-01-01 RX ORDER — DIPHENHYDRAMINE HYDROCHLORIDE 50 MG/ML
12.5 INJECTION INTRAMUSCULAR; INTRAVENOUS ONCE
Status: COMPLETED | OUTPATIENT
Start: 2018-01-01 | End: 2018-01-01

## 2018-01-01 RX ORDER — HYDRALAZINE HYDROCHLORIDE 20 MG/ML
5 INJECTION INTRAMUSCULAR; INTRAVENOUS EVERY 10 MIN PRN
Status: DISCONTINUED | OUTPATIENT
Start: 2018-01-01 | End: 2018-01-01 | Stop reason: HOSPADM

## 2018-01-01 RX ORDER — HEPARIN SODIUM (PORCINE) LOCK FLUSH IV SOLN 100 UNIT/ML 100 UNIT/ML
500 SOLUTION INTRAVENOUS PRN
Status: DISCONTINUED | OUTPATIENT
Start: 2018-01-01 | End: 2018-01-01 | Stop reason: HOSPADM

## 2018-01-01 RX ORDER — TACROLIMUS 0.5 MG/1
CAPSULE ORAL
Qty: 60 CAPSULE | Refills: 3 | Status: SHIPPED | OUTPATIENT
Start: 2018-01-01

## 2018-01-01 RX ORDER — FLUCONAZOLE 200 MG/1
400 TABLET ORAL DAILY
Status: DISCONTINUED | OUTPATIENT
Start: 2018-01-01 | End: 2018-01-01

## 2018-01-01 RX ORDER — MORPHINE SULFATE 4 MG/ML
INJECTION, SOLUTION INTRAMUSCULAR; INTRAVENOUS
Status: COMPLETED
Start: 2018-01-01 | End: 2018-01-01

## 2018-01-01 RX ORDER — PROPOFOL 10 MG/ML
INJECTION, EMULSION INTRAVENOUS
Status: COMPLETED
Start: 2018-01-01 | End: 2018-01-01

## 2018-01-01 RX ORDER — 0.9 % SODIUM CHLORIDE 0.9 %
500 INTRAVENOUS SOLUTION INTRAVENOUS ONCE
Status: COMPLETED | OUTPATIENT
Start: 2018-01-01 | End: 2018-01-01

## 2018-01-01 RX ORDER — ZOLPIDEM TARTRATE 5 MG/1
5 TABLET ORAL NIGHTLY PRN
Status: DISCONTINUED | OUTPATIENT
Start: 2018-01-01 | End: 2018-01-01

## 2018-01-01 RX ORDER — FUROSEMIDE 10 MG/ML
40 INJECTION INTRAMUSCULAR; INTRAVENOUS 2 TIMES DAILY
Status: DISCONTINUED | OUTPATIENT
Start: 2018-01-01 | End: 2018-01-01

## 2018-01-01 RX ORDER — PROPRANOLOL HYDROCHLORIDE 20 MG/1
20 TABLET ORAL 3 TIMES DAILY
Status: DISCONTINUED | OUTPATIENT
Start: 2018-01-01 | End: 2018-01-01

## 2018-01-01 RX ORDER — NICOTINE POLACRILEX 4 MG
15 LOZENGE BUCCAL PRN
Status: DISCONTINUED | OUTPATIENT
Start: 2018-01-01 | End: 2018-01-01 | Stop reason: HOSPADM

## 2018-01-01 RX ORDER — SODIUM CHLORIDE 9 MG/ML
INJECTION, SOLUTION INTRAVENOUS CONTINUOUS PRN
Status: DISCONTINUED | OUTPATIENT
Start: 2018-01-01 | End: 2018-01-01 | Stop reason: HOSPADM

## 2018-01-01 RX ORDER — ACETAMINOPHEN 325 MG/1
650 TABLET ORAL ONCE
Status: COMPLETED | OUTPATIENT
Start: 2018-01-01 | End: 2018-01-01

## 2018-01-01 RX ORDER — SODIUM CHLORIDE 0.9 % (FLUSH) 0.9 %
10 SYRINGE (ML) INJECTION EVERY 12 HOURS SCHEDULED
Status: DISCONTINUED | OUTPATIENT
Start: 2018-01-01 | End: 2018-01-01 | Stop reason: HOSPADM

## 2018-01-01 RX ORDER — MEPERIDINE HYDROCHLORIDE 25 MG/ML
12.5 INJECTION INTRAMUSCULAR; INTRAVENOUS; SUBCUTANEOUS EVERY 5 MIN PRN
Status: DISCONTINUED | OUTPATIENT
Start: 2018-01-01 | End: 2018-01-01 | Stop reason: HOSPADM

## 2018-01-01 RX ORDER — LORAZEPAM 0.5 MG/1
0.5 TABLET ORAL EVERY 4 HOURS PRN
Status: DISCONTINUED | OUTPATIENT
Start: 2018-01-01 | End: 2018-01-01

## 2018-01-01 RX ORDER — SODIUM CHLORIDE 9 MG/ML
INJECTION, SOLUTION INTRAVENOUS CONTINUOUS
Status: DISCONTINUED | OUTPATIENT
Start: 2018-01-01 | End: 2018-01-01 | Stop reason: HOSPADM

## 2018-01-01 RX ORDER — DIPHENHYDRAMINE HYDROCHLORIDE 50 MG/ML
25 INJECTION INTRAMUSCULAR; INTRAVENOUS PRN
Status: DISCONTINUED | OUTPATIENT
Start: 2018-01-01 | End: 2018-01-01

## 2018-01-01 RX ORDER — EPINEPHRINE 1 MG/ML
0.3 INJECTION, SOLUTION, CONCENTRATE INTRAVENOUS
Status: DISPENSED | OUTPATIENT
Start: 2018-01-01 | End: 2018-01-01

## 2018-01-01 RX ORDER — ALBUMIN (HUMAN) 12.5 G/50ML
25 SOLUTION INTRAVENOUS ONCE
Status: DISCONTINUED | OUTPATIENT
Start: 2018-01-01 | End: 2018-01-01

## 2018-01-01 RX ORDER — ACETAMINOPHEN 325 MG/1
650 TABLET ORAL EVERY 4 HOURS PRN
Status: DISCONTINUED | OUTPATIENT
Start: 2018-01-01 | End: 2018-01-01 | Stop reason: HOSPADM

## 2018-01-01 RX ORDER — DEXTROSE MONOHYDRATE 50 MG/ML
INJECTION, SOLUTION INTRAVENOUS CONTINUOUS
Status: DISCONTINUED | OUTPATIENT
Start: 2018-01-01 | End: 2018-01-01

## 2018-01-01 RX ORDER — TACROLIMUS 0.5 MG/1
0.5 CAPSULE ORAL 2 TIMES DAILY
Status: DISCONTINUED | OUTPATIENT
Start: 2018-01-01 | End: 2018-01-01

## 2018-01-01 RX ORDER — TACROLIMUS 0.5 MG/1
0.5 CAPSULE ORAL NIGHTLY
Status: DISCONTINUED | OUTPATIENT
Start: 2018-01-01 | End: 2018-01-01 | Stop reason: ALTCHOICE

## 2018-01-01 RX ORDER — HEPARIN SODIUM 1000 [USP'U]/ML
3200 INJECTION, SOLUTION INTRAVENOUS; SUBCUTANEOUS PRN
Status: DISCONTINUED | OUTPATIENT
Start: 2018-01-01 | End: 2018-01-01 | Stop reason: HOSPADM

## 2018-01-01 RX ORDER — SIMETHICONE 80 MG
80 TABLET,CHEWABLE ORAL EVERY 6 HOURS PRN
Status: DISCONTINUED | OUTPATIENT
Start: 2018-01-01 | End: 2018-01-01 | Stop reason: HOSPADM

## 2018-01-01 RX ORDER — FUROSEMIDE 10 MG/ML
80 INJECTION INTRAMUSCULAR; INTRAVENOUS ONCE
Status: DISCONTINUED | OUTPATIENT
Start: 2018-01-01 | End: 2018-01-01

## 2018-01-01 RX ORDER — LEVALBUTEROL INHALATION SOLUTION 0.63 MG/3ML
0.63 SOLUTION RESPIRATORY (INHALATION) EVERY 6 HOURS PRN
Status: DISCONTINUED | OUTPATIENT
Start: 2018-01-01 | End: 2018-01-01

## 2018-01-01 RX ORDER — MAGNESIUM SULFATE IN WATER 40 MG/ML
4 INJECTION, SOLUTION INTRAVENOUS PRN
Status: DISCONTINUED | OUTPATIENT
Start: 2018-01-01 | End: 2018-01-01

## 2018-01-01 RX ORDER — MORPHINE SULFATE 4 MG/ML
4 INJECTION, SOLUTION INTRAMUSCULAR; INTRAVENOUS
Status: DISCONTINUED | OUTPATIENT
Start: 2018-01-01 | End: 2018-01-01 | Stop reason: HOSPADM

## 2018-01-01 RX ORDER — TACROLIMUS 0.5 MG/1
0.5 CAPSULE ORAL 2 TIMES DAILY
Status: DISCONTINUED | OUTPATIENT
Start: 2018-01-01 | End: 2018-01-01 | Stop reason: ALTCHOICE

## 2018-01-01 RX ORDER — POTASSIUM CHLORIDE 29.8 MG/ML
20 INJECTION INTRAVENOUS PRN
Status: DISCONTINUED | OUTPATIENT
Start: 2018-01-01 | End: 2018-01-01

## 2018-01-01 RX ORDER — ZOLPIDEM TARTRATE 10 MG/1
10 TABLET ORAL NIGHTLY PRN
COMMUNITY
Start: 2017-05-30

## 2018-01-01 RX ORDER — 0.9 % SODIUM CHLORIDE 0.9 %
100 INTRAVENOUS SOLUTION INTRAVENOUS PRN
Status: DISCONTINUED | OUTPATIENT
Start: 2018-01-01 | End: 2018-01-01 | Stop reason: HOSPADM

## 2018-01-01 RX ORDER — MAGNESIUM SULFATE 1 G/100ML
1 INJECTION INTRAVENOUS PRN
Status: DISCONTINUED | OUTPATIENT
Start: 2018-01-01 | End: 2018-01-01 | Stop reason: HOSPADM

## 2018-01-01 RX ORDER — METHOTREXATE 25 MG/ML
20 INJECTION, SOLUTION INTRA-ARTERIAL; INTRAMUSCULAR; INTRAVENOUS ONCE
Status: DISCONTINUED | OUTPATIENT
Start: 2018-01-01 | End: 2018-01-01 | Stop reason: DRUGHIGH

## 2018-01-01 RX ORDER — ONDANSETRON 2 MG/ML
8 INJECTION INTRAMUSCULAR; INTRAVENOUS EVERY 8 HOURS PRN
Status: DISCONTINUED | OUTPATIENT
Start: 2018-01-01 | End: 2018-01-01 | Stop reason: HOSPADM

## 2018-01-01 RX ORDER — TACROLIMUS 1 MG/1
1 CAPSULE ORAL DAILY
Status: DISCONTINUED | OUTPATIENT
Start: 2018-01-01 | End: 2018-01-01

## 2018-01-01 RX ORDER — OXYCODONE HYDROCHLORIDE 5 MG/1
5 TABLET ORAL EVERY 4 HOURS PRN
Status: DISCONTINUED | OUTPATIENT
Start: 2018-01-01 | End: 2018-01-01

## 2018-01-01 RX ORDER — DEXTROSE AND SODIUM CHLORIDE 5; .45 G/100ML; G/100ML
1000 INJECTION, SOLUTION INTRAVENOUS CONTINUOUS
Status: DISPENSED | OUTPATIENT
Start: 2018-01-01 | End: 2018-01-01

## 2018-01-01 RX ORDER — DEXAMETHASONE 4 MG/1
20 TABLET ORAL ONCE
Status: COMPLETED | OUTPATIENT
Start: 2018-01-01 | End: 2018-01-01

## 2018-01-01 RX ORDER — TACROLIMUS 1 MG/1
1 CAPSULE ORAL 2 TIMES DAILY
Status: DISCONTINUED | OUTPATIENT
Start: 2018-01-01 | End: 2018-01-01

## 2018-01-01 RX ORDER — POTASSIUM CHLORIDE 1.5 G/1.77G
20 POWDER, FOR SOLUTION ORAL 2 TIMES DAILY
Status: DISCONTINUED | OUTPATIENT
Start: 2018-01-01 | End: 2018-01-01

## 2018-01-01 RX ORDER — CLINDAMYCIN PHOSPHATE 900 MG/50ML
900 INJECTION INTRAVENOUS ONCE
Status: COMPLETED | OUTPATIENT
Start: 2018-01-01 | End: 2018-01-01

## 2018-01-01 RX ORDER — BACLOFEN 10 MG/1
10 TABLET ORAL 3 TIMES DAILY
Status: DISCONTINUED | OUTPATIENT
Start: 2018-01-01 | End: 2018-01-01

## 2018-01-01 RX ORDER — FUROSEMIDE 10 MG/ML
20 INJECTION INTRAMUSCULAR; INTRAVENOUS 2 TIMES DAILY
Status: DISCONTINUED | OUTPATIENT
Start: 2018-01-01 | End: 2018-01-01

## 2018-01-01 RX ORDER — PROPOFOL 10 MG/ML
50 INJECTION, EMULSION INTRAVENOUS ONCE
Status: COMPLETED | OUTPATIENT
Start: 2018-01-01 | End: 2018-01-01

## 2018-01-01 RX ORDER — FUROSEMIDE 10 MG/ML
40 INJECTION INTRAMUSCULAR; INTRAVENOUS EVERY 12 HOURS
Status: DISCONTINUED | OUTPATIENT
Start: 2018-01-01 | End: 2018-01-01

## 2018-01-01 RX ORDER — LEVOFLOXACIN 500 MG/1
500 TABLET, FILM COATED ORAL NIGHTLY
Status: DISCONTINUED | OUTPATIENT
Start: 2018-01-01 | End: 2018-01-01

## 2018-01-01 RX ORDER — URSODIOL 250 MG/1
250 TABLET, FILM COATED ORAL
Status: DISCONTINUED | OUTPATIENT
Start: 2018-01-01 | End: 2018-01-01 | Stop reason: HOSPADM

## 2018-01-01 RX ORDER — TACROLIMUS 0.5 MG/1
0.5 CAPSULE ORAL ONCE
Status: COMPLETED | OUTPATIENT
Start: 2018-01-01 | End: 2018-01-01

## 2018-01-01 RX ORDER — HALOPERIDOL 5 MG/ML
2 INJECTION INTRAMUSCULAR ONCE
Status: DISCONTINUED | OUTPATIENT
Start: 2018-01-01 | End: 2018-01-01

## 2018-01-01 RX ORDER — DEXTROSE MONOHYDRATE 25 G/50ML
12.5 INJECTION, SOLUTION INTRAVENOUS PRN
Status: DISCONTINUED | OUTPATIENT
Start: 2018-01-01 | End: 2018-01-01 | Stop reason: ALTCHOICE

## 2018-01-01 RX ORDER — ALBUMIN (HUMAN) 12.5 G/50ML
12.5 SOLUTION INTRAVENOUS PRN
Status: DISCONTINUED | OUTPATIENT
Start: 2018-01-01 | End: 2018-01-01 | Stop reason: HOSPADM

## 2018-01-01 RX ORDER — HALOPERIDOL 5 MG/ML
2 INJECTION INTRAMUSCULAR PRN
Status: DISCONTINUED | OUTPATIENT
Start: 2018-01-01 | End: 2018-01-01 | Stop reason: HOSPADM

## 2018-01-01 RX ORDER — ATROPINE SULFATE 10 MG/ML
2 SOLUTION/ DROPS OPHTHALMIC
Status: DISCONTINUED | OUTPATIENT
Start: 2018-01-01 | End: 2018-01-01 | Stop reason: HOSPADM

## 2018-01-01 RX ORDER — OXYCODONE HYDROCHLORIDE 5 MG/1
5 TABLET ORAL PRN
Status: DISCONTINUED | OUTPATIENT
Start: 2018-01-01 | End: 2018-01-01 | Stop reason: HOSPADM

## 2018-01-01 RX ORDER — PRENATAL VIT 91/IRON/FOLIC/DHA 28-975-200
COMBINATION PACKAGE (EA) ORAL 2 TIMES DAILY
Status: DISCONTINUED | OUTPATIENT
Start: 2018-01-01 | End: 2018-01-01

## 2018-01-01 RX ORDER — VALACYCLOVIR HYDROCHLORIDE 500 MG/1
500 TABLET, FILM COATED ORAL 2 TIMES DAILY
Status: DISCONTINUED | OUTPATIENT
Start: 2018-01-01 | End: 2018-01-01

## 2018-01-01 RX ORDER — 0.9 % SODIUM CHLORIDE 0.9 %
500 INTRAVENOUS SOLUTION INTRAVENOUS ONCE
Status: DISCONTINUED | OUTPATIENT
Start: 2018-01-01 | End: 2018-01-01 | Stop reason: HOSPADM

## 2018-01-01 RX ORDER — PANTOPRAZOLE SODIUM 40 MG/1
40 GRANULE, DELAYED RELEASE ORAL
Status: DISCONTINUED | OUTPATIENT
Start: 2018-01-01 | End: 2018-01-01 | Stop reason: HOSPADM

## 2018-01-01 RX ORDER — TACROLIMUS 0.5 MG/1
0.5 CAPSULE ORAL 2 TIMES DAILY
Status: DISCONTINUED | OUTPATIENT
Start: 2018-01-01 | End: 2018-01-01 | Stop reason: DRUGHIGH

## 2018-01-01 RX ORDER — PROCHLORPERAZINE MALEATE 10 MG
10 TABLET ORAL EVERY 4 HOURS PRN
Status: DISCONTINUED | OUTPATIENT
Start: 2018-01-01 | End: 2018-01-01 | Stop reason: HOSPADM

## 2018-01-01 RX ORDER — BACLOFEN 10 MG/1
10 TABLET ORAL 3 TIMES DAILY
COMMUNITY

## 2018-01-01 RX ORDER — MORPHINE SULFATE 4 MG/ML
4 INJECTION, SOLUTION INTRAMUSCULAR; INTRAVENOUS EVERY 4 HOURS PRN
Status: DISCONTINUED | OUTPATIENT
Start: 2018-01-01 | End: 2018-01-01

## 2018-01-01 RX ORDER — FUROSEMIDE 20 MG/1
20 TABLET ORAL 2 TIMES DAILY
Status: DISCONTINUED | OUTPATIENT
Start: 2018-01-01 | End: 2018-01-01

## 2018-01-01 RX ORDER — TACROLIMUS 1 MG/1
1 CAPSULE ORAL NIGHTLY
Status: DISCONTINUED | OUTPATIENT
Start: 2018-01-01 | End: 2018-01-01 | Stop reason: ALTCHOICE

## 2018-01-01 RX ORDER — MORPHINE SULFATE 4 MG/ML
4 INJECTION, SOLUTION INTRAMUSCULAR; INTRAVENOUS
Status: DISCONTINUED | OUTPATIENT
Start: 2018-01-01 | End: 2018-01-01

## 2018-01-01 RX ORDER — LIDOCAINE HYDROCHLORIDE 10 MG/ML
INJECTION, SOLUTION INFILTRATION; PERINEURAL PRN
Status: DISCONTINUED | OUTPATIENT
Start: 2018-01-01 | End: 2018-01-01 | Stop reason: HOSPADM

## 2018-01-01 RX ORDER — LORAZEPAM 2 MG/ML
0.5 INJECTION INTRAMUSCULAR EVERY 4 HOURS PRN
Status: DISCONTINUED | OUTPATIENT
Start: 2018-01-01 | End: 2018-01-01

## 2018-01-01 RX ORDER — PROPRANOLOL HYDROCHLORIDE 20 MG/1
80 TABLET ORAL DAILY
Status: DISCONTINUED | OUTPATIENT
Start: 2018-01-01 | End: 2018-01-01

## 2018-01-01 RX ORDER — MORPHINE SULFATE 2 MG/ML
1 INJECTION, SOLUTION INTRAMUSCULAR; INTRAVENOUS EVERY 5 MIN PRN
Status: DISCONTINUED | OUTPATIENT
Start: 2018-01-01 | End: 2018-01-01 | Stop reason: HOSPADM

## 2018-01-01 RX ORDER — PANTOPRAZOLE SODIUM 40 MG/10ML
40 INJECTION, POWDER, LYOPHILIZED, FOR SOLUTION INTRAVENOUS DAILY
Status: DISCONTINUED | OUTPATIENT
Start: 2018-01-01 | End: 2018-01-01

## 2018-01-01 RX ORDER — SODIUM CHLORIDE 9 MG/ML
INJECTION, SOLUTION INTRAVENOUS
Status: DISCONTINUED
Start: 2018-01-01 | End: 2018-01-01

## 2018-01-01 RX ORDER — LINEZOLID 2 MG/ML
600 INJECTION, SOLUTION INTRAVENOUS EVERY 12 HOURS
Status: DISCONTINUED | OUTPATIENT
Start: 2018-01-01 | End: 2018-01-01 | Stop reason: HOSPADM

## 2018-01-01 RX ORDER — DEXTROSE MONOHYDRATE 50 MG/ML
100 INJECTION, SOLUTION INTRAVENOUS PRN
Status: DISCONTINUED | OUTPATIENT
Start: 2018-01-01 | End: 2018-01-01 | Stop reason: HOSPADM

## 2018-01-01 RX ORDER — XENON XE-133 10 MCI/1
10 GAS RESPIRATORY (INHALATION)
Status: COMPLETED | OUTPATIENT
Start: 2018-01-01 | End: 2018-01-01

## 2018-01-01 RX ORDER — LOPERAMIDE HYDROCHLORIDE 2 MG/1
2 CAPSULE ORAL PRN
Status: DISCONTINUED | OUTPATIENT
Start: 2018-01-01 | End: 2018-01-01 | Stop reason: HOSPADM

## 2018-01-01 RX ORDER — MORPHINE SULFATE 2 MG/ML
2 INJECTION, SOLUTION INTRAMUSCULAR; INTRAVENOUS PRN
Status: DISPENSED | OUTPATIENT
Start: 2018-01-01 | End: 2018-01-01

## 2018-01-01 RX ORDER — FUROSEMIDE 10 MG/ML
80 INJECTION INTRAMUSCULAR; INTRAVENOUS ONCE
Status: COMPLETED | OUTPATIENT
Start: 2018-01-01 | End: 2018-01-01

## 2018-01-01 RX ORDER — DEXAMETHASONE 0.5 MG/5ML
1 SOLUTION ORAL 4 TIMES DAILY
Status: DISCONTINUED | OUTPATIENT
Start: 2018-01-01 | End: 2018-01-01

## 2018-01-01 RX ORDER — IBUPROFEN 200 MG
200 TABLET ORAL
Status: ON HOLD | COMMUNITY
End: 2018-01-01

## 2018-01-01 RX ORDER — 0.9 % SODIUM CHLORIDE 0.9 %
10 VIAL (ML) INJECTION DAILY
Status: DISCONTINUED | OUTPATIENT
Start: 2018-01-01 | End: 2018-01-01

## 2018-01-01 RX ORDER — MORPHINE SULFATE 2 MG/ML
2 INJECTION, SOLUTION INTRAMUSCULAR; INTRAVENOUS EVERY 4 HOURS PRN
Status: DISCONTINUED | OUTPATIENT
Start: 2018-01-01 | End: 2018-01-01

## 2018-01-01 RX ORDER — HEPARIN SODIUM (PORCINE) LOCK FLUSH IV SOLN 100 UNIT/ML 100 UNIT/ML
SOLUTION INTRAVENOUS PRN
Status: DISCONTINUED | OUTPATIENT
Start: 2018-01-01 | End: 2018-01-01 | Stop reason: HOSPADM

## 2018-01-01 RX ORDER — BUPIVACAINE HYDROCHLORIDE AND EPINEPHRINE 5; 5 MG/ML; UG/ML
INJECTION, SOLUTION EPIDURAL; INTRACAUDAL; PERINEURAL PRN
Status: DISCONTINUED | OUTPATIENT
Start: 2018-01-01 | End: 2018-01-01 | Stop reason: HOSPADM

## 2018-01-01 RX ORDER — CASTOR OIL AND BALSAM, PERU 788; 87 MG/G; MG/G
OINTMENT TOPICAL 2 TIMES DAILY
Status: DISCONTINUED | OUTPATIENT
Start: 2018-01-01 | End: 2018-01-01 | Stop reason: HOSPADM

## 2018-01-01 RX ORDER — URSODIOL 300 MG/1
300 CAPSULE ORAL
Status: ON HOLD | COMMUNITY
End: 2018-01-01 | Stop reason: DRUGHIGH

## 2018-01-01 RX ORDER — AMLODIPINE BESYLATE 5 MG/1
5 TABLET ORAL DAILY
Status: DISCONTINUED | OUTPATIENT
Start: 2018-01-01 | End: 2018-01-01

## 2018-01-01 RX ORDER — MORPHINE SULFATE 2 MG/ML
2 INJECTION, SOLUTION INTRAMUSCULAR; INTRAVENOUS
Status: DISCONTINUED | OUTPATIENT
Start: 2018-01-01 | End: 2018-01-01

## 2018-01-01 RX ORDER — POTASSIUM CHLORIDE 20 MEQ/1
20 TABLET, EXTENDED RELEASE ORAL 2 TIMES DAILY WITH MEALS
Status: DISCONTINUED | OUTPATIENT
Start: 2018-01-01 | End: 2018-01-01

## 2018-01-01 RX ORDER — LEVALBUTEROL INHALATION SOLUTION 0.63 MG/3ML
0.63 SOLUTION RESPIRATORY (INHALATION) EVERY 6 HOURS
Status: DISCONTINUED | OUTPATIENT
Start: 2018-01-01 | End: 2018-01-01

## 2018-01-01 RX ORDER — DEXTROSE, SODIUM CHLORIDE, AND POTASSIUM CHLORIDE 5; .45; .075 G/100ML; G/100ML; G/100ML
1000 INJECTION INTRAVENOUS CONTINUOUS
Status: DISCONTINUED | OUTPATIENT
Start: 2018-01-01 | End: 2018-01-01

## 2018-01-01 RX ORDER — TACROLIMUS 0.5 MG/1
0.5 CAPSULE ORAL DAILY
Status: DISCONTINUED | OUTPATIENT
Start: 2018-01-01 | End: 2018-01-01

## 2018-01-01 RX ORDER — PROPOFOL 10 MG/ML
10 INJECTION, EMULSION INTRAVENOUS
Status: DISCONTINUED | OUTPATIENT
Start: 2018-01-01 | End: 2018-01-01 | Stop reason: HOSPADM

## 2018-01-01 RX ORDER — DIPHENHYDRAMINE HYDROCHLORIDE 50 MG/ML
12.5 INJECTION INTRAMUSCULAR; INTRAVENOUS PRN
Status: DISCONTINUED | OUTPATIENT
Start: 2018-01-01 | End: 2018-01-01

## 2018-01-01 RX ORDER — ACETAMINOPHEN 325 MG/1
650 TABLET ORAL EVERY 6 HOURS PRN
COMMUNITY

## 2018-01-01 RX ORDER — DEXTROSE MONOHYDRATE 25 G/50ML
12.5 INJECTION, SOLUTION INTRAVENOUS PRN
Status: DISCONTINUED | OUTPATIENT
Start: 2018-01-01 | End: 2018-01-01 | Stop reason: HOSPADM

## 2018-01-01 RX ADMIN — Medication 1 MG: at 09:42

## 2018-01-01 RX ADMIN — CASTOR OIL AND BALSAM, PERU: 788; 87 OINTMENT TOPICAL at 08:02

## 2018-01-01 RX ADMIN — MEROPENEM 500 MG: 500 INJECTION, POWDER, FOR SOLUTION INTRAVENOUS at 18:03

## 2018-01-01 RX ADMIN — LEVOFLOXACIN 500 MG: 500 TABLET, FILM COATED ORAL at 21:08

## 2018-01-01 RX ADMIN — VALACYCLOVIR HYDROCHLORIDE 500 MG: 500 TABLET, FILM COATED ORAL at 09:07

## 2018-01-01 RX ADMIN — TACROLIMUS 2.5 MG: 0.5 CAPSULE ORAL at 09:18

## 2018-01-01 RX ADMIN — Medication 0.5 MG: at 21:45

## 2018-01-01 RX ADMIN — VALACYCLOVIR HYDROCHLORIDE 500 MG: 500 TABLET, FILM COATED ORAL at 09:55

## 2018-01-01 RX ADMIN — MICAFUNGIN SODIUM 50 MG: 10 INJECTION, POWDER, LYOPHILIZED, FOR SOLUTION INTRAVENOUS at 10:14

## 2018-01-01 RX ADMIN — TACROLIMUS 1.5 MG: 0.5 CAPSULE ORAL at 21:08

## 2018-01-01 RX ADMIN — VALACYCLOVIR HYDROCHLORIDE 500 MG: 500 TABLET, FILM COATED ORAL at 20:22

## 2018-01-01 RX ADMIN — VANCOMYCIN HYDROCHLORIDE 1250 MG: 10 INJECTION, POWDER, LYOPHILIZED, FOR SOLUTION INTRAVENOUS at 03:40

## 2018-01-01 RX ADMIN — MICONAZOLE NITRATE: 20 POWDER TOPICAL at 13:04

## 2018-01-01 RX ADMIN — URSODIOL 250 MG: 250 TABLET, FILM COATED ORAL at 21:29

## 2018-01-01 RX ADMIN — LEVALBUTEROL HYDROCHLORIDE 0.63 MG: 0.63 SOLUTION RESPIRATORY (INHALATION) at 11:12

## 2018-01-01 RX ADMIN — URSODIOL 250 MG: 250 TABLET, FILM COATED ORAL at 16:21

## 2018-01-01 RX ADMIN — Medication 10 ML: at 08:16

## 2018-01-01 RX ADMIN — VALACYCLOVIR HYDROCHLORIDE 500 MG: 500 TABLET, FILM COATED ORAL at 20:51

## 2018-01-01 RX ADMIN — TACROLIMUS 1 MG: 1 CAPSULE ORAL at 20:54

## 2018-01-01 RX ADMIN — PROPRANOLOL HYDROCHLORIDE 80 MG: 20 TABLET ORAL at 08:50

## 2018-01-01 RX ADMIN — LOPERAMIDE HYDROCHLORIDE 2 MG: 2 CAPSULE ORAL at 17:57

## 2018-01-01 RX ADMIN — POTASSIUM CHLORIDE, DEXTROSE MONOHYDRATE AND SODIUM CHLORIDE 1000 ML: 75; 5; 450 INJECTION, SOLUTION INTRAVENOUS at 00:29

## 2018-01-01 RX ADMIN — FUROSEMIDE 40 MG: 10 INJECTION, SOLUTION INTRAMUSCULAR; INTRAVENOUS at 07:16

## 2018-01-01 RX ADMIN — ONDANSETRON HYDROCHLORIDE 12 MG: 8 TABLET, FILM COATED ORAL at 09:54

## 2018-01-01 RX ADMIN — MORPHINE SULFATE 2 MG: 2 INJECTION, SOLUTION INTRAMUSCULAR; INTRAVENOUS at 09:25

## 2018-01-01 RX ADMIN — ALBUMIN (HUMAN) 12.5 G: 0.25 INJECTION, SOLUTION INTRAVENOUS at 18:49

## 2018-01-01 RX ADMIN — TACROLIMUS 1 MG: 1 CAPSULE ORAL at 20:23

## 2018-01-01 RX ADMIN — SODIUM CHLORIDE 15 ML: 900 IRRIGANT IRRIGATION at 20:24

## 2018-01-01 RX ADMIN — MEROPENEM 1 G: 1 INJECTION, POWDER, FOR SOLUTION INTRAVENOUS at 07:31

## 2018-01-01 RX ADMIN — PROCHLORPERAZINE EDISYLATE 10 MG: 5 INJECTION INTRAMUSCULAR; INTRAVENOUS at 13:53

## 2018-01-01 RX ADMIN — Medication 1 MG: at 20:06

## 2018-01-01 RX ADMIN — URSODIOL 250 MG: 250 TABLET, FILM COATED ORAL at 18:02

## 2018-01-01 RX ADMIN — SODIUM CHLORIDE 15 ML: 900 IRRIGANT IRRIGATION at 08:08

## 2018-01-01 RX ADMIN — TBO-FILGRASTIM 300 MCG: 300 INJECTION, SOLUTION SUBCUTANEOUS at 18:42

## 2018-01-01 RX ADMIN — VALACYCLOVIR HYDROCHLORIDE 500 MG: 500 TABLET, FILM COATED ORAL at 21:40

## 2018-01-01 RX ADMIN — LINEZOLID 600 MG: 600 INJECTION, SOLUTION INTRAVENOUS at 10:29

## 2018-01-01 RX ADMIN — Medication 2500 ML/HR: at 21:48

## 2018-01-01 RX ADMIN — DIPHENHYDRAMINE HCL 25 MG: 25 TABLET ORAL at 08:05

## 2018-01-01 RX ADMIN — SODIUM CHLORIDE 15 ML: 900 IRRIGANT IRRIGATION at 11:42

## 2018-01-01 RX ADMIN — PROPRANOLOL HYDROCHLORIDE 80 MG: 20 TABLET ORAL at 09:37

## 2018-01-01 RX ADMIN — TACROLIMUS 1.5 MG: 0.5 CAPSULE ORAL at 08:38

## 2018-01-01 RX ADMIN — TACROLIMUS 1.5 MG: 0.5 CAPSULE ORAL at 21:27

## 2018-01-01 RX ADMIN — LEVALBUTEROL HYDROCHLORIDE 0.63 MG: 0.63 SOLUTION RESPIRATORY (INHALATION) at 03:50

## 2018-01-01 RX ADMIN — SODIUM CHLORIDE 15 ML: 900 IRRIGANT IRRIGATION at 14:32

## 2018-01-01 RX ADMIN — Medication 1 MG: at 14:27

## 2018-01-01 RX ADMIN — POTASSIUM CHLORIDE: 2 INJECTION, SOLUTION, CONCENTRATE INTRAVENOUS at 17:32

## 2018-01-01 RX ADMIN — MELPHALAN HYDROCHLORIDE 300 MG: 50 INJECTION, POWDER, LYOPHILIZED, FOR SOLUTION INTRAVENOUS at 15:08

## 2018-01-01 RX ADMIN — SODIUM CHLORIDE 250 ML: 9 INJECTION, SOLUTION INTRAVENOUS at 15:17

## 2018-01-01 RX ADMIN — SODIUM CHLORIDE 15 ML: 900 IRRIGANT IRRIGATION at 09:23

## 2018-01-01 RX ADMIN — CASTOR OIL AND BALSAM, PERU: 788; 87 OINTMENT TOPICAL at 09:01

## 2018-01-01 RX ADMIN — ALBUMIN (HUMAN) 12.5 G: 0.25 INJECTION, SOLUTION INTRAVENOUS at 19:35

## 2018-01-01 RX ADMIN — FUROSEMIDE 20 MG: 20 TABLET ORAL at 16:21

## 2018-01-01 RX ADMIN — Medication 0.5 MG: at 21:48

## 2018-01-01 RX ADMIN — URSODIOL 250 MG: 250 TABLET, FILM COATED ORAL at 06:48

## 2018-01-01 RX ADMIN — Medication 10 ML: at 23:54

## 2018-01-01 RX ADMIN — URSODIOL 250 MG: 250 TABLET, FILM COATED ORAL at 16:40

## 2018-01-01 RX ADMIN — Medication 10 ML: at 10:04

## 2018-01-01 RX ADMIN — MEROPENEM 1 G: 1 INJECTION, POWDER, FOR SOLUTION INTRAVENOUS at 15:57

## 2018-01-01 RX ADMIN — Medication 500 UNITS: at 11:51

## 2018-01-01 RX ADMIN — VALACYCLOVIR HYDROCHLORIDE 500 MG: 500 TABLET, FILM COATED ORAL at 09:23

## 2018-01-01 RX ADMIN — Medication 100 UNITS: at 10:37

## 2018-01-01 RX ADMIN — SODIUM CHLORIDE 250 ML: 9 INJECTION, SOLUTION INTRAVENOUS at 05:56

## 2018-01-01 RX ADMIN — SULFAMETHOXAZOLE AND TRIMETHOPRIM 1 TABLET: 800; 160 TABLET ORAL at 20:38

## 2018-01-01 RX ADMIN — Medication 5 ML: at 10:14

## 2018-01-01 RX ADMIN — MICONAZOLE NITRATE: 20 POWDER TOPICAL at 09:36

## 2018-01-01 RX ADMIN — DIPHENHYDRAMINE HYDROCHLORIDE 12.5 MG: 50 INJECTION, SOLUTION INTRAMUSCULAR; INTRAVENOUS at 04:48

## 2018-01-01 RX ADMIN — VALACYCLOVIR HYDROCHLORIDE 500 MG: 500 TABLET, FILM COATED ORAL at 21:08

## 2018-01-01 RX ADMIN — SODIUM CHLORIDE 15 ML: 900 IRRIGANT IRRIGATION at 03:31

## 2018-01-01 RX ADMIN — LEVOFLOXACIN 500 MG: 500 TABLET, FILM COATED ORAL at 21:36

## 2018-01-01 RX ADMIN — URSODIOL 250 MG: 250 TABLET, FILM COATED ORAL at 11:33

## 2018-01-01 RX ADMIN — URSODIOL 250 MG: 250 TABLET, FILM COATED ORAL at 05:45

## 2018-01-01 RX ADMIN — SODIUM CHLORIDE: 900 IRRIGANT IRRIGATION at 17:06

## 2018-01-01 RX ADMIN — SODIUM CHLORIDE: 9 INJECTION, SOLUTION INTRAVENOUS at 14:04

## 2018-01-01 RX ADMIN — URSODIOL 250 MG: 250 TABLET, FILM COATED ORAL at 18:09

## 2018-01-01 RX ADMIN — Medication 5 MILLICURIE: at 11:45

## 2018-01-01 RX ADMIN — INSULIN HUMAN 2 UNITS: 100 INJECTION, SOLUTION PARENTERAL at 18:36

## 2018-01-01 RX ADMIN — SODIUM CHLORIDE 15 ML: 900 IRRIGANT IRRIGATION at 11:25

## 2018-01-01 RX ADMIN — Medication 10 ML: at 09:23

## 2018-01-01 RX ADMIN — LOPERAMIDE HYDROCHLORIDE 2 MG: 2 CAPSULE ORAL at 22:49

## 2018-01-01 RX ADMIN — MICONAZOLE NITRATE: 20 POWDER TOPICAL at 22:33

## 2018-01-01 RX ADMIN — MORPHINE SULFATE 4 MG: 4 INJECTION INTRAVENOUS at 09:19

## 2018-01-01 RX ADMIN — POTASSIUM PHOSPHATE, MONOBASIC AND POTASSIUM PHOSPHATE, DIBASIC: 224; 236 INJECTION, SOLUTION INTRAVENOUS at 14:42

## 2018-01-01 RX ADMIN — MICAFUNGIN SODIUM 150 MG: 10 INJECTION, POWDER, LYOPHILIZED, FOR SOLUTION INTRAVENOUS at 09:13

## 2018-01-01 RX ADMIN — AMLODIPINE BESYLATE 5 MG: 5 TABLET ORAL at 08:38

## 2018-01-01 RX ADMIN — POTASSIUM CHLORIDE 20 MEQ: 20 TABLET, EXTENDED RELEASE ORAL at 12:12

## 2018-01-01 RX ADMIN — MEROPENEM 500 MG: 500 INJECTION, POWDER, FOR SOLUTION INTRAVENOUS at 16:31

## 2018-01-01 RX ADMIN — Medication 1 MG: at 08:38

## 2018-01-01 RX ADMIN — SODIUM CHLORIDE 15 ML: 900 IRRIGANT IRRIGATION at 06:54

## 2018-01-01 RX ADMIN — DIPHENHYDRAMINE HCL 25 MG: 25 TABLET ORAL at 05:57

## 2018-01-01 RX ADMIN — INSULIN HUMAN 6 UNITS: 100 INJECTION, SOLUTION PARENTERAL at 13:32

## 2018-01-01 RX ADMIN — ZOLPIDEM TARTRATE 5 MG: 5 TABLET ORAL at 23:52

## 2018-01-01 RX ADMIN — BACLOFEN 10 MG: 10 TABLET ORAL at 10:53

## 2018-01-01 RX ADMIN — BACLOFEN 10 MG: 10 TABLET ORAL at 21:27

## 2018-01-01 RX ADMIN — VALACYCLOVIR HYDROCHLORIDE 500 MG: 500 TABLET, FILM COATED ORAL at 09:26

## 2018-01-01 RX ADMIN — Medication 0.5 MG: at 20:33

## 2018-01-01 RX ADMIN — BACLOFEN 10 MG: 10 TABLET ORAL at 21:33

## 2018-01-01 RX ADMIN — ONDANSETRON 8 MG: 2 INJECTION INTRAMUSCULAR; INTRAVENOUS at 07:29

## 2018-01-01 RX ADMIN — MEROPENEM 1 G: 1 INJECTION, POWDER, FOR SOLUTION INTRAVENOUS at 08:48

## 2018-01-01 RX ADMIN — PROPRANOLOL HYDROCHLORIDE 20 MG: 20 TABLET ORAL at 14:22

## 2018-01-01 RX ADMIN — Medication 10 ML: at 10:49

## 2018-01-01 RX ADMIN — URSODIOL 250 MG: 250 TABLET, FILM COATED ORAL at 17:49

## 2018-01-01 RX ADMIN — SODIUM CHLORIDE 250 ML: 9 INJECTION, SOLUTION INTRAVENOUS at 04:46

## 2018-01-01 RX ADMIN — URSODIOL 250 MG: 250 TABLET, FILM COATED ORAL at 09:54

## 2018-01-01 RX ADMIN — BACLOFEN 10 MG: 10 TABLET ORAL at 09:50

## 2018-01-01 RX ADMIN — LOPERAMIDE HYDROCHLORIDE 2 MG: 2 CAPSULE ORAL at 09:26

## 2018-01-01 RX ADMIN — MICAFUNGIN SODIUM 50 MG: 10 INJECTION, POWDER, LYOPHILIZED, FOR SOLUTION INTRAVENOUS at 09:55

## 2018-01-01 RX ADMIN — DEXTROSE MONOHYDRATE: 50 INJECTION, SOLUTION INTRAVENOUS at 22:08

## 2018-01-01 RX ADMIN — HEPARIN SODIUM 3200 UNITS: 1000 INJECTION INTRAVENOUS; SUBCUTANEOUS at 17:13

## 2018-01-01 RX ADMIN — Medication 1 MG: at 09:32

## 2018-01-01 RX ADMIN — URSODIOL 250 MG: 250 TABLET, FILM COATED ORAL at 12:11

## 2018-01-01 RX ADMIN — FLUCONAZOLE 400 MG: 200 TABLET ORAL at 09:18

## 2018-01-01 RX ADMIN — LOPERAMIDE HYDROCHLORIDE 2 MG: 2 CAPSULE ORAL at 06:57

## 2018-01-01 RX ADMIN — Medication 2500 ML/HR: at 09:54

## 2018-01-01 RX ADMIN — POTASSIUM CHLORIDE 20 MEQ: 20 TABLET, EXTENDED RELEASE ORAL at 18:01

## 2018-01-01 RX ADMIN — URSODIOL 250 MG: 250 TABLET, FILM COATED ORAL at 16:22

## 2018-01-01 RX ADMIN — BACLOFEN 10 MG: 10 TABLET ORAL at 20:38

## 2018-01-01 RX ADMIN — BACLOFEN 10 MG: 10 TABLET ORAL at 21:40

## 2018-01-01 RX ADMIN — CASTOR OIL AND BALSAM, PERU: 788; 87 OINTMENT TOPICAL at 21:06

## 2018-01-01 RX ADMIN — PANTOPRAZOLE SODIUM 40 MG: 40 GRANULE, DELAYED RELEASE ORAL at 13:05

## 2018-01-01 RX ADMIN — NOREPINEPHRINE BITARTRATE 4 MCG/MIN: 1 INJECTION INTRAVENOUS at 13:02

## 2018-01-01 RX ADMIN — Medication 0.5 MG: at 00:12

## 2018-01-01 RX ADMIN — PIPERACILLIN SODIUM,TAZOBACTAM SODIUM 4.5 G: 4; .5 INJECTION, POWDER, FOR SOLUTION INTRAVENOUS at 18:08

## 2018-01-01 RX ADMIN — LOPERAMIDE HYDROCHLORIDE 2 MG: 2 CAPSULE ORAL at 11:38

## 2018-01-01 RX ADMIN — ONDANSETRON HYDROCHLORIDE 12 MG: 8 TABLET, FILM COATED ORAL at 08:10

## 2018-01-01 RX ADMIN — Medication 10 ML: at 08:15

## 2018-01-01 RX ADMIN — POTASSIUM CHLORIDE: 2 INJECTION, SOLUTION, CONCENTRATE INTRAVENOUS at 04:49

## 2018-01-01 RX ADMIN — MORPHINE SULFATE 4 MG: 4 INJECTION INTRAVENOUS at 21:23

## 2018-01-01 RX ADMIN — CASTOR OIL AND BALSAM, PERU: 788; 87 OINTMENT TOPICAL at 22:22

## 2018-01-01 RX ADMIN — URSODIOL 250 MG: 250 TABLET, FILM COATED ORAL at 18:27

## 2018-01-01 RX ADMIN — Medication 10 ML: at 20:23

## 2018-01-01 RX ADMIN — LINEZOLID 600 MG: 600 INJECTION, SOLUTION INTRAVENOUS at 08:13

## 2018-01-01 RX ADMIN — MEROPENEM 1 G: 1 INJECTION, POWDER, FOR SOLUTION INTRAVENOUS at 17:33

## 2018-01-01 RX ADMIN — PANTOPRAZOLE SODIUM 40 MG: 40 TABLET, DELAYED RELEASE ORAL at 06:40

## 2018-01-01 RX ADMIN — MEROPENEM 1 G: 1 INJECTION, POWDER, FOR SOLUTION INTRAVENOUS at 15:16

## 2018-01-01 RX ADMIN — PROPRANOLOL HYDROCHLORIDE 20 MG: 20 TABLET ORAL at 21:32

## 2018-01-01 RX ADMIN — SODIUM CHLORIDE 15 ML: 900 IRRIGANT IRRIGATION at 06:49

## 2018-01-01 RX ADMIN — URSODIOL 250 MG: 250 TABLET, FILM COATED ORAL at 17:42

## 2018-01-01 RX ADMIN — MEROPENEM 1 G: 1 INJECTION, POWDER, FOR SOLUTION INTRAVENOUS at 00:39

## 2018-01-01 RX ADMIN — MICAFUNGIN SODIUM 150 MG: 10 INJECTION, POWDER, LYOPHILIZED, FOR SOLUTION INTRAVENOUS at 09:35

## 2018-01-01 RX ADMIN — Medication 1 MG: at 18:01

## 2018-01-01 RX ADMIN — PANTOPRAZOLE SODIUM 40 MG: 40 INJECTION, POWDER, FOR SOLUTION INTRAVENOUS at 08:38

## 2018-01-01 RX ADMIN — MICONAZOLE NITRATE: 20 POWDER TOPICAL at 23:55

## 2018-01-01 RX ADMIN — LOPERAMIDE HYDROCHLORIDE 2 MG: 2 CAPSULE ORAL at 19:52

## 2018-01-01 RX ADMIN — Medication 0.5 MG: at 22:28

## 2018-01-01 RX ADMIN — SODIUM CHLORIDE 15 ML: 900 IRRIGANT IRRIGATION at 20:58

## 2018-01-01 RX ADMIN — SODIUM CHLORIDE 15 ML: 900 IRRIGANT IRRIGATION at 17:28

## 2018-01-01 RX ADMIN — INSULIN HUMAN 4 UNITS: 100 INJECTION, SOLUTION PARENTERAL at 23:30

## 2018-01-01 RX ADMIN — INSULIN HUMAN 8 UNITS: 100 INJECTION, SOLUTION PARENTERAL at 16:04

## 2018-01-01 RX ADMIN — URSODIOL 250 MG: 250 TABLET, FILM COATED ORAL at 11:09

## 2018-01-01 RX ADMIN — Medication 10 ML: at 20:13

## 2018-01-01 RX ADMIN — SODIUM CHLORIDE: 0.9 INJECTION, SOLUTION INTRAVENOUS at 00:00

## 2018-01-01 RX ADMIN — SODIUM CHLORIDE 250 ML: 9 INJECTION, SOLUTION INTRAVENOUS at 06:15

## 2018-01-01 RX ADMIN — MICONAZOLE NITRATE: 20 POWDER TOPICAL at 21:07

## 2018-01-01 RX ADMIN — AMLODIPINE BESYLATE 5 MG: 5 TABLET ORAL at 09:54

## 2018-01-01 RX ADMIN — CETIRIZINE HYDROCHLORIDE 10 MG: 10 TABLET, FILM COATED ORAL at 20:55

## 2018-01-01 RX ADMIN — PANTOPRAZOLE SODIUM 40 MG: 40 TABLET, DELAYED RELEASE ORAL at 05:56

## 2018-01-01 RX ADMIN — PROPRANOLOL HYDROCHLORIDE 20 MG: 20 TABLET ORAL at 08:03

## 2018-01-01 RX ADMIN — SODIUM CHLORIDE 250 ML: 9 INJECTION, SOLUTION INTRAVENOUS at 05:26

## 2018-01-01 RX ADMIN — AMLODIPINE BESYLATE 5 MG: 5 TABLET ORAL at 09:07

## 2018-01-01 RX ADMIN — ACYCLOVIR SODIUM 350 MG: 50 INJECTION, SOLUTION INTRAVENOUS at 20:53

## 2018-01-01 RX ADMIN — CASTOR OIL AND BALSAM, PERU: 788; 87 OINTMENT TOPICAL at 16:15

## 2018-01-01 RX ADMIN — URSODIOL 250 MG: 250 TABLET, FILM COATED ORAL at 06:40

## 2018-01-01 RX ADMIN — LOPERAMIDE HYDROCHLORIDE 2 MG: 2 CAPSULE ORAL at 02:13

## 2018-01-01 RX ADMIN — LOPERAMIDE HYDROCHLORIDE 2 MG: 2 CAPSULE ORAL at 15:33

## 2018-01-01 RX ADMIN — LINEZOLID 600 MG: 600 INJECTION, SOLUTION INTRAVENOUS at 23:02

## 2018-01-01 RX ADMIN — Medication 0.5 MG: at 16:14

## 2018-01-01 RX ADMIN — LINEZOLID 600 MG: 600 INJECTION, SOLUTION INTRAVENOUS at 22:02

## 2018-01-01 RX ADMIN — VANCOMYCIN HYDROCHLORIDE 1250 MG: 10 INJECTION, POWDER, LYOPHILIZED, FOR SOLUTION INTRAVENOUS at 18:01

## 2018-01-01 RX ADMIN — Medication 10 ML: at 21:00

## 2018-01-01 RX ADMIN — PROPOFOL 10 MCG/KG/MIN: 10 INJECTION, EMULSION INTRAVENOUS at 19:29

## 2018-01-01 RX ADMIN — INSULIN HUMAN 2 UNITS: 100 INJECTION, SOLUTION PARENTERAL at 21:40

## 2018-01-01 RX ADMIN — MEROPENEM 1 G: 1 INJECTION, POWDER, FOR SOLUTION INTRAVENOUS at 15:25

## 2018-01-01 RX ADMIN — URSODIOL 250 MG: 250 TABLET, FILM COATED ORAL at 15:58

## 2018-01-01 RX ADMIN — BACLOFEN 5 MG: 10 TABLET ORAL at 13:52

## 2018-01-01 RX ADMIN — SODIUM CHLORIDE 15 ML: 900 IRRIGANT IRRIGATION at 06:16

## 2018-01-01 RX ADMIN — ACYCLOVIR SODIUM 450 MG: 50 INJECTION, SOLUTION INTRAVENOUS at 11:09

## 2018-01-01 RX ADMIN — PANTOPRAZOLE SODIUM 40 MG: 40 TABLET, DELAYED RELEASE ORAL at 05:57

## 2018-01-01 RX ADMIN — VALACYCLOVIR HYDROCHLORIDE 500 MG: 500 TABLET, FILM COATED ORAL at 09:06

## 2018-01-01 RX ADMIN — SODIUM CHLORIDE 250 ML: 9 INJECTION, SOLUTION INTRAVENOUS at 13:25

## 2018-01-01 RX ADMIN — MORPHINE SULFATE 4 MG: 4 INJECTION INTRAVENOUS at 17:46

## 2018-01-01 RX ADMIN — VALACYCLOVIR HYDROCHLORIDE 500 MG: 500 TABLET, FILM COATED ORAL at 21:41

## 2018-01-01 RX ADMIN — Medication 1 MG: at 12:02

## 2018-01-01 RX ADMIN — VALACYCLOVIR HYDROCHLORIDE 500 MG: 500 TABLET, FILM COATED ORAL at 08:43

## 2018-01-01 RX ADMIN — LOPERAMIDE HYDROCHLORIDE 2 MG: 2 CAPSULE ORAL at 00:26

## 2018-01-01 RX ADMIN — MAGNESIUM SULFATE IN WATER 4 G: 40 INJECTION, SOLUTION INTRAVENOUS at 05:07

## 2018-01-01 RX ADMIN — Medication 10 ML: at 21:41

## 2018-01-01 RX ADMIN — TACROLIMUS 2.5 MG: 0.5 CAPSULE ORAL at 09:26

## 2018-01-01 RX ADMIN — POTASSIUM CHLORIDE: 2 INJECTION, SOLUTION, CONCENTRATE INTRAVENOUS at 03:08

## 2018-01-01 RX ADMIN — Medication 10 ML: at 20:36

## 2018-01-01 RX ADMIN — Medication 1 MG: at 16:00

## 2018-01-01 RX ADMIN — OXYCODONE HYDROCHLORIDE 5 MG: 5 TABLET ORAL at 15:43

## 2018-01-01 RX ADMIN — ALBUMIN (HUMAN) 12.5 G: 0.25 INJECTION, SOLUTION INTRAVENOUS at 09:56

## 2018-01-01 RX ADMIN — MEROPENEM 1 G: 1 INJECTION, POWDER, FOR SOLUTION INTRAVENOUS at 00:53

## 2018-01-01 RX ADMIN — CASTOR OIL AND BALSAM, PERU: 788; 87 OINTMENT TOPICAL at 21:32

## 2018-01-01 RX ADMIN — MEROPENEM 1 G: 1 INJECTION, POWDER, FOR SOLUTION INTRAVENOUS at 00:26

## 2018-01-01 RX ADMIN — MORPHINE SULFATE 2 MG: 2 INJECTION, SOLUTION INTRAMUSCULAR; INTRAVENOUS at 23:05

## 2018-01-01 RX ADMIN — Medication 1 MG: at 08:12

## 2018-01-01 RX ADMIN — ACETAMINOPHEN 650 MG: 325 TABLET, FILM COATED ORAL at 21:22

## 2018-01-01 RX ADMIN — DIPHENHYDRAMINE HYDROCHLORIDE 12.5 MG: 50 INJECTION, SOLUTION INTRAMUSCULAR; INTRAVENOUS at 05:11

## 2018-01-01 RX ADMIN — MICAFUNGIN SODIUM 50 MG: 10 INJECTION, POWDER, LYOPHILIZED, FOR SOLUTION INTRAVENOUS at 09:28

## 2018-01-01 RX ADMIN — Medication 1 MG: at 09:18

## 2018-01-01 RX ADMIN — TACROLIMUS 2.5 MG: 0.5 CAPSULE ORAL at 21:36

## 2018-01-01 RX ADMIN — DEXTROSE MONOHYDRATE: 50 INJECTION, SOLUTION INTRAVENOUS at 20:20

## 2018-01-01 RX ADMIN — MEROPENEM 500 MG: 500 INJECTION, POWDER, FOR SOLUTION INTRAVENOUS at 15:38

## 2018-01-01 RX ADMIN — SODIUM CHLORIDE: 9 INJECTION, SOLUTION INTRAVENOUS at 02:21

## 2018-01-01 RX ADMIN — POTASSIUM CHLORIDE 20 MEQ: 400 INJECTION, SOLUTION INTRAVENOUS at 01:14

## 2018-01-01 RX ADMIN — MICAFUNGIN SODIUM 150 MG: 10 INJECTION, POWDER, LYOPHILIZED, FOR SOLUTION INTRAVENOUS at 13:32

## 2018-01-01 RX ADMIN — BACLOFEN 10 MG: 10 TABLET ORAL at 14:31

## 2018-01-01 RX ADMIN — FUROSEMIDE 20 MG: 10 INJECTION, SOLUTION INTRAMUSCULAR; INTRAVENOUS at 17:09

## 2018-01-01 RX ADMIN — MORPHINE SULFATE 4 MG: 4 INJECTION INTRAVENOUS at 15:58

## 2018-01-01 RX ADMIN — OXYCODONE HYDROCHLORIDE 5 MG: 5 TABLET ORAL at 03:22

## 2018-01-01 RX ADMIN — OXYCODONE HYDROCHLORIDE 5 MG: 5 TABLET ORAL at 02:46

## 2018-01-01 RX ADMIN — Medication 0.5 MG: at 13:01

## 2018-01-01 RX ADMIN — URSODIOL 250 MG: 250 TABLET, FILM COATED ORAL at 12:32

## 2018-01-01 RX ADMIN — SODIUM CHLORIDE 15 ML: 900 IRRIGANT IRRIGATION at 11:44

## 2018-01-01 RX ADMIN — Medication 5 ML: at 09:16

## 2018-01-01 RX ADMIN — SODIUM CHLORIDE 15 ML: 900 IRRIGANT IRRIGATION at 23:06

## 2018-01-01 RX ADMIN — VALACYCLOVIR HYDROCHLORIDE 500 MG: 500 TABLET, FILM COATED ORAL at 09:18

## 2018-01-01 RX ADMIN — INSULIN HUMAN 2 UNITS: 100 INJECTION, SOLUTION PARENTERAL at 21:23

## 2018-01-01 RX ADMIN — Medication 10 ML: at 08:38

## 2018-01-01 RX ADMIN — CHLOROTHIAZIDE SODIUM 500 MG: 500 INJECTION, POWDER, LYOPHILIZED, FOR SOLUTION INTRAVENOUS at 12:18

## 2018-01-01 RX ADMIN — SODIUM CHLORIDE 15 ML: 900 IRRIGANT IRRIGATION at 10:17

## 2018-01-01 RX ADMIN — LEVOFLOXACIN 500 MG: 500 TABLET, FILM COATED ORAL at 21:41

## 2018-01-01 RX ADMIN — Medication 10 ML: at 21:27

## 2018-01-01 RX ADMIN — INSULIN HUMAN 6 UNITS: 100 INJECTION, SOLUTION PARENTERAL at 00:45

## 2018-01-01 RX ADMIN — CASTOR OIL AND BALSAM, PERU: 788; 87 OINTMENT TOPICAL at 20:46

## 2018-01-01 RX ADMIN — Medication 10 ML: at 08:52

## 2018-01-01 RX ADMIN — MORPHINE SULFATE 2 MG: 2 INJECTION, SOLUTION INTRAMUSCULAR; INTRAVENOUS at 08:03

## 2018-01-01 RX ADMIN — BACLOFEN 10 MG: 10 TABLET ORAL at 08:52

## 2018-01-01 RX ADMIN — SODIUM CHLORIDE 500 ML: 9 INJECTION, SOLUTION INTRAVENOUS at 22:00

## 2018-01-01 RX ADMIN — LOPERAMIDE HYDROCHLORIDE 2 MG: 2 CAPSULE ORAL at 03:29

## 2018-01-01 RX ADMIN — Medication 10 ML: at 04:01

## 2018-01-01 RX ADMIN — VANCOMYCIN HYDROCHLORIDE 1250 MG: 10 INJECTION, POWDER, LYOPHILIZED, FOR SOLUTION INTRAVENOUS at 12:14

## 2018-01-01 RX ADMIN — BACLOFEN 10 MG: 10 TABLET ORAL at 09:55

## 2018-01-01 RX ADMIN — LINEZOLID 600 MG: 600 INJECTION, SOLUTION INTRAVENOUS at 15:20

## 2018-01-01 RX ADMIN — OXYCODONE HYDROCHLORIDE 2.5 MG: 5 TABLET ORAL at 08:35

## 2018-01-01 RX ADMIN — BACLOFEN 5 MG: 10 TABLET ORAL at 09:13

## 2018-01-01 RX ADMIN — POTASSIUM CHLORIDE: 2 INJECTION, SOLUTION, CONCENTRATE INTRAVENOUS at 09:32

## 2018-01-01 RX ADMIN — URSODIOL 250 MG: 250 TABLET, FILM COATED ORAL at 08:29

## 2018-01-01 RX ADMIN — BACLOFEN 10 MG: 10 TABLET ORAL at 20:10

## 2018-01-01 RX ADMIN — MICAFUNGIN SODIUM 50 MG: 10 INJECTION, POWDER, LYOPHILIZED, FOR SOLUTION INTRAVENOUS at 09:32

## 2018-01-01 RX ADMIN — Medication 10 ML: at 09:59

## 2018-01-01 RX ADMIN — VANCOMYCIN HYDROCHLORIDE 1250 MG: 10 INJECTION, POWDER, LYOPHILIZED, FOR SOLUTION INTRAVENOUS at 05:23

## 2018-01-01 RX ADMIN — POTASSIUM CHLORIDE: 2 INJECTION, SOLUTION, CONCENTRATE INTRAVENOUS at 16:48

## 2018-01-01 RX ADMIN — DEXTROSE MONOHYDRATE: 50 INJECTION, SOLUTION INTRAVENOUS at 06:27

## 2018-01-01 RX ADMIN — TBO-FILGRASTIM 300 MCG: 300 INJECTION, SOLUTION SUBCUTANEOUS at 18:02

## 2018-01-01 RX ADMIN — BACLOFEN 5 MG: 10 TABLET ORAL at 20:23

## 2018-01-01 RX ADMIN — BACLOFEN 10 MG: 10 TABLET ORAL at 09:22

## 2018-01-01 RX ADMIN — FLUCONAZOLE 400 MG: 200 TABLET ORAL at 08:57

## 2018-01-01 RX ADMIN — URSODIOL 250 MG: 250 TABLET, FILM COATED ORAL at 18:34

## 2018-01-01 RX ADMIN — Medication 10 ML: at 20:47

## 2018-01-01 RX ADMIN — URSODIOL 250 MG: 250 TABLET, FILM COATED ORAL at 20:15

## 2018-01-01 RX ADMIN — MORPHINE SULFATE 2 MG: 2 INJECTION, SOLUTION INTRAMUSCULAR; INTRAVENOUS at 11:21

## 2018-01-01 RX ADMIN — SODIUM CHLORIDE 250 ML: 9 INJECTION, SOLUTION INTRAVENOUS at 05:50

## 2018-01-01 RX ADMIN — INSULIN HUMAN 6 UNITS: 100 INJECTION, SOLUTION PARENTERAL at 07:04

## 2018-01-01 RX ADMIN — URSODIOL 250 MG: 250 TABLET, FILM COATED ORAL at 18:28

## 2018-01-01 RX ADMIN — URSODIOL 250 MG: 250 TABLET, FILM COATED ORAL at 11:24

## 2018-01-01 RX ADMIN — POTASSIUM CHLORIDE: 2 INJECTION, SOLUTION, CONCENTRATE INTRAVENOUS at 17:31

## 2018-01-01 RX ADMIN — VANCOMYCIN HYDROCHLORIDE 1250 MG: 10 INJECTION, POWDER, LYOPHILIZED, FOR SOLUTION INTRAVENOUS at 11:10

## 2018-01-01 RX ADMIN — Medication 10 ML: at 21:40

## 2018-01-01 RX ADMIN — URSODIOL 250 MG: 250 TABLET, FILM COATED ORAL at 11:36

## 2018-01-01 RX ADMIN — PROPRANOLOL HYDROCHLORIDE 80 MG: 20 TABLET ORAL at 09:54

## 2018-01-01 RX ADMIN — VALACYCLOVIR HYDROCHLORIDE 500 MG: 500 TABLET, FILM COATED ORAL at 21:28

## 2018-01-01 RX ADMIN — MORPHINE SULFATE: 4 INJECTION, SOLUTION INTRAMUSCULAR; INTRAVENOUS at 05:00

## 2018-01-01 RX ADMIN — CASTOR OIL AND BALSAM, PERU: 788; 87 OINTMENT TOPICAL at 21:26

## 2018-01-01 RX ADMIN — URSODIOL 250 MG: 250 TABLET, FILM COATED ORAL at 18:19

## 2018-01-01 RX ADMIN — VALACYCLOVIR HYDROCHLORIDE 500 MG: 500 TABLET, FILM COATED ORAL at 20:38

## 2018-01-01 RX ADMIN — MEROPENEM 1 G: 1 INJECTION, POWDER, FOR SOLUTION INTRAVENOUS at 12:10

## 2018-01-01 RX ADMIN — BACLOFEN 5 MG: 10 TABLET ORAL at 07:57

## 2018-01-01 RX ADMIN — SODIUM CHLORIDE: 9 INJECTION, SOLUTION INTRAVENOUS at 09:23

## 2018-01-01 RX ADMIN — INSULIN HUMAN 4 UNITS: 100 INJECTION, SOLUTION PARENTERAL at 00:32

## 2018-01-01 RX ADMIN — Medication 500 ML/HR: at 18:54

## 2018-01-01 RX ADMIN — URSODIOL 250 MG: 250 TABLET, FILM COATED ORAL at 21:27

## 2018-01-01 RX ADMIN — TACROLIMUS 0.5 MG: 0.5 CAPSULE ORAL at 08:43

## 2018-01-01 RX ADMIN — SODIUM CHLORIDE 15 ML: 900 IRRIGANT IRRIGATION at 11:09

## 2018-01-01 RX ADMIN — URSODIOL 250 MG: 250 TABLET, FILM COATED ORAL at 21:26

## 2018-01-01 RX ADMIN — SODIUM CHLORIDE: 9 INJECTION, SOLUTION INTRAVENOUS at 12:51

## 2018-01-01 RX ADMIN — Medication 10 ML: at 20:49

## 2018-01-01 RX ADMIN — DIPHENHYDRAMINE HCL 25 MG: 25 TABLET ORAL at 19:46

## 2018-01-01 RX ADMIN — SODIUM CHLORIDE 15 ML: 900 IRRIGANT IRRIGATION at 00:42

## 2018-01-01 RX ADMIN — AMLODIPINE BESYLATE 5 MG: 5 TABLET ORAL at 08:10

## 2018-01-01 RX ADMIN — PHENYLEPHRINE HYDROCHLORIDE 300 MCG/MIN: 10 INJECTION INTRAVENOUS at 04:24

## 2018-01-01 RX ADMIN — DIPHENHYDRAMINE HYDROCHLORIDE 12.5 MG: 50 INJECTION, SOLUTION INTRAMUSCULAR; INTRAVENOUS at 04:46

## 2018-01-01 RX ADMIN — SODIUM CHLORIDE 250 ML: 9 INJECTION, SOLUTION INTRAVENOUS at 05:54

## 2018-01-01 RX ADMIN — BACLOFEN 10 MG: 10 TABLET ORAL at 15:28

## 2018-01-01 RX ADMIN — URSODIOL 250 MG: 250 TABLET, FILM COATED ORAL at 20:54

## 2018-01-01 RX ADMIN — URSODIOL 250 MG: 250 TABLET, FILM COATED ORAL at 08:03

## 2018-01-01 RX ADMIN — URSODIOL 250 MG: 250 TABLET, FILM COATED ORAL at 07:57

## 2018-01-01 RX ADMIN — MEROPENEM 1 G: 1 INJECTION, POWDER, FOR SOLUTION INTRAVENOUS at 23:35

## 2018-01-01 RX ADMIN — URSODIOL 250 MG: 250 TABLET, FILM COATED ORAL at 20:02

## 2018-01-01 RX ADMIN — PROPOFOL 20 MCG/KG/MIN: 10 INJECTION, EMULSION INTRAVENOUS at 16:08

## 2018-01-01 RX ADMIN — Medication 10 ML: at 08:45

## 2018-01-01 RX ADMIN — URSODIOL 250 MG: 250 TABLET, FILM COATED ORAL at 11:27

## 2018-01-01 RX ADMIN — PROPRANOLOL HYDROCHLORIDE 80 MG: 20 TABLET ORAL at 08:47

## 2018-01-01 RX ADMIN — ACYCLOVIR SODIUM 450 MG: 50 INJECTION, SOLUTION INTRAVENOUS at 21:07

## 2018-01-01 RX ADMIN — LOPERAMIDE HYDROCHLORIDE 2 MG: 2 CAPSULE ORAL at 23:05

## 2018-01-01 RX ADMIN — ALTEPLASE 2 MG: 2.2 INJECTION, POWDER, LYOPHILIZED, FOR SOLUTION INTRAVENOUS at 00:15

## 2018-01-01 RX ADMIN — PIPERACILLIN SODIUM,TAZOBACTAM SODIUM 4.5 G: 4; .5 INJECTION, POWDER, FOR SOLUTION INTRAVENOUS at 00:50

## 2018-01-01 RX ADMIN — POTASSIUM CHLORIDE 20 MEQ: 400 INJECTION, SOLUTION INTRAVENOUS at 09:40

## 2018-01-01 RX ADMIN — SODIUM CHLORIDE, SODIUM LACTATE, POTASSIUM CHLORIDE, AND CALCIUM CHLORIDE: 600; 310; 30; 20 INJECTION, SOLUTION INTRAVENOUS at 07:19

## 2018-01-01 RX ADMIN — INSULIN HUMAN 6 UNITS: 100 INJECTION, SOLUTION PARENTERAL at 16:26

## 2018-01-01 RX ADMIN — ACYCLOVIR SODIUM 450 MG: 50 INJECTION, SOLUTION INTRAVENOUS at 09:13

## 2018-01-01 RX ADMIN — VALACYCLOVIR HYDROCHLORIDE 500 MG: 500 TABLET, FILM COATED ORAL at 09:29

## 2018-01-01 RX ADMIN — FLUCONAZOLE 400 MG: 200 TABLET ORAL at 08:08

## 2018-01-01 RX ADMIN — CASTOR OIL AND BALSAM, PERU: 788; 87 OINTMENT TOPICAL at 09:39

## 2018-01-01 RX ADMIN — SODIUM CHLORIDE 250 ML: 9 INJECTION, SOLUTION INTRAVENOUS at 16:50

## 2018-01-01 RX ADMIN — ZOLPIDEM TARTRATE 5 MG: 5 TABLET ORAL at 22:01

## 2018-01-01 RX ADMIN — LOPERAMIDE HYDROCHLORIDE 2 MG: 2 CAPSULE ORAL at 21:36

## 2018-01-01 RX ADMIN — URSODIOL 250 MG: 250 TABLET, FILM COATED ORAL at 17:39

## 2018-01-01 RX ADMIN — MICAFUNGIN SODIUM 150 MG: 10 INJECTION, POWDER, LYOPHILIZED, FOR SOLUTION INTRAVENOUS at 12:18

## 2018-01-01 RX ADMIN — SODIUM CHLORIDE 15 ML: 900 IRRIGANT IRRIGATION at 21:31

## 2018-01-01 RX ADMIN — PROPRANOLOL HYDROCHLORIDE 80 MG: 20 TABLET ORAL at 09:06

## 2018-01-01 RX ADMIN — SODIUM CHLORIDE 15 ML: 900 IRRIGANT IRRIGATION at 12:27

## 2018-01-01 RX ADMIN — Medication 500 ML/HR: at 20:37

## 2018-01-01 RX ADMIN — BACLOFEN 10 MG: 10 TABLET ORAL at 08:29

## 2018-01-01 RX ADMIN — AMLODIPINE BESYLATE 5 MG: 5 TABLET ORAL at 08:52

## 2018-01-01 RX ADMIN — VALACYCLOVIR HYDROCHLORIDE 500 MG: 500 TABLET, FILM COATED ORAL at 21:17

## 2018-01-01 RX ADMIN — POTASSIUM CHLORIDE 20 MEQ: 400 INJECTION, SOLUTION INTRAVENOUS at 09:00

## 2018-01-01 RX ADMIN — MORPHINE SULFATE 4 MG: 4 INJECTION INTRAVENOUS at 21:35

## 2018-01-01 RX ADMIN — MEROPENEM 1 G: 1 INJECTION, POWDER, FOR SOLUTION INTRAVENOUS at 23:05

## 2018-01-01 RX ADMIN — LINEZOLID 600 MG: 600 INJECTION, SOLUTION INTRAVENOUS at 10:56

## 2018-01-01 RX ADMIN — Medication 10 ML: at 09:55

## 2018-01-01 RX ADMIN — MEROPENEM 1 G: 1 INJECTION, POWDER, FOR SOLUTION INTRAVENOUS at 09:11

## 2018-01-01 RX ADMIN — CASTOR OIL AND BALSAM, PERU: 788; 87 OINTMENT TOPICAL at 20:59

## 2018-01-01 RX ADMIN — Medication 10 ML: at 10:26

## 2018-01-01 RX ADMIN — FUROSEMIDE 20 MG: 20 TABLET ORAL at 08:43

## 2018-01-01 RX ADMIN — FUROSEMIDE 20 MG: 20 TABLET ORAL at 17:25

## 2018-01-01 RX ADMIN — URSODIOL 250 MG: 250 TABLET, FILM COATED ORAL at 13:17

## 2018-01-01 RX ADMIN — LOPERAMIDE HYDROCHLORIDE 2 MG: 2 CAPSULE ORAL at 21:07

## 2018-01-01 RX ADMIN — AMLODIPINE BESYLATE 5 MG: 5 TABLET ORAL at 09:27

## 2018-01-01 RX ADMIN — PROPRANOLOL HYDROCHLORIDE 20 MG: 20 TABLET ORAL at 21:09

## 2018-01-01 RX ADMIN — URSODIOL 250 MG: 250 TABLET, FILM COATED ORAL at 11:47

## 2018-01-01 RX ADMIN — Medication 5 ML: at 18:00

## 2018-01-01 RX ADMIN — PANTOPRAZOLE SODIUM 40 MG: 40 INJECTION, POWDER, FOR SOLUTION INTRAVENOUS at 09:31

## 2018-01-01 RX ADMIN — MAGNESIUM SULFATE IN WATER 4 G: 40 INJECTION, SOLUTION INTRAVENOUS at 05:00

## 2018-01-01 RX ADMIN — SODIUM CHLORIDE 250 ML: 9 INJECTION, SOLUTION INTRAVENOUS at 06:44

## 2018-01-01 RX ADMIN — TACROLIMUS 0.5 MG: 0.5 CAPSULE ORAL at 09:06

## 2018-01-01 RX ADMIN — OXYCODONE HYDROCHLORIDE 5 MG: 5 TABLET ORAL at 00:16

## 2018-01-01 RX ADMIN — CALCIUM GLUCONATE: 94 INJECTION, SOLUTION INTRAVENOUS at 15:45

## 2018-01-01 RX ADMIN — MICONAZOLE NITRATE: 20 POWDER TOPICAL at 21:23

## 2018-01-01 RX ADMIN — MEROPENEM 1 G: 1 INJECTION, POWDER, FOR SOLUTION INTRAVENOUS at 02:09

## 2018-01-01 RX ADMIN — URSODIOL 250 MG: 250 TABLET, FILM COATED ORAL at 06:05

## 2018-01-01 RX ADMIN — Medication 5 ML: at 19:37

## 2018-01-01 RX ADMIN — MICAFUNGIN SODIUM 50 MG: 10 INJECTION, POWDER, LYOPHILIZED, FOR SOLUTION INTRAVENOUS at 10:37

## 2018-01-01 RX ADMIN — SODIUM CHLORIDE 250 ML: 9 INJECTION, SOLUTION INTRAVENOUS at 08:17

## 2018-01-01 RX ADMIN — VALACYCLOVIR HYDROCHLORIDE 500 MG: 500 TABLET, FILM COATED ORAL at 21:36

## 2018-01-01 RX ADMIN — CASTOR OIL AND BALSAM, PERU: 788; 87 OINTMENT TOPICAL at 12:00

## 2018-01-01 RX ADMIN — Medication 10 ML: at 09:02

## 2018-01-01 RX ADMIN — VALACYCLOVIR HYDROCHLORIDE 500 MG: 500 TABLET, FILM COATED ORAL at 20:08

## 2018-01-01 RX ADMIN — ACYCLOVIR SODIUM 450 MG: 50 INJECTION, SOLUTION INTRAVENOUS at 21:47

## 2018-01-01 RX ADMIN — BACLOFEN 10 MG: 10 TABLET ORAL at 14:35

## 2018-01-01 RX ADMIN — Medication 10 ML: at 09:38

## 2018-01-01 RX ADMIN — TOBRAMYCIN SULFATE 180 MG: 40 INJECTION, SOLUTION INTRAMUSCULAR; INTRAVENOUS at 13:02

## 2018-01-01 RX ADMIN — MEROPENEM 1 G: 1 INJECTION, POWDER, FOR SOLUTION INTRAVENOUS at 02:01

## 2018-01-01 RX ADMIN — BACLOFEN 10 MG: 10 TABLET ORAL at 13:08

## 2018-01-01 RX ADMIN — LINEZOLID 600 MG: 600 INJECTION, SOLUTION INTRAVENOUS at 20:59

## 2018-01-01 RX ADMIN — PANTOPRAZOLE SODIUM 40 MG: 40 TABLET, DELAYED RELEASE ORAL at 06:05

## 2018-01-01 RX ADMIN — SODIUM CHLORIDE 250 ML: 9 INJECTION, SOLUTION INTRAVENOUS at 09:30

## 2018-01-01 RX ADMIN — BACLOFEN 5 MG: 10 TABLET ORAL at 20:54

## 2018-01-01 RX ADMIN — Medication 10 ML: at 21:15

## 2018-01-01 RX ADMIN — URSODIOL 250 MG: 250 TABLET, FILM COATED ORAL at 12:49

## 2018-01-01 RX ADMIN — HEPARIN SODIUM 3200 UNITS: 1000 INJECTION INTRAVENOUS; SUBCUTANEOUS at 18:31

## 2018-01-01 RX ADMIN — CLINDAMYCIN PHOSPHATE 900 MG: 18 INJECTION, SOLUTION INTRAVENOUS at 07:27

## 2018-01-01 RX ADMIN — LOPERAMIDE HYDROCHLORIDE 2 MG: 2 CAPSULE ORAL at 12:48

## 2018-01-01 RX ADMIN — URSODIOL 250 MG: 250 TABLET, FILM COATED ORAL at 20:38

## 2018-01-01 RX ADMIN — LORAZEPAM 0.5 MG: 0.5 TABLET ORAL at 02:34

## 2018-01-01 RX ADMIN — ONDANSETRON 8 MG: 2 INJECTION INTRAMUSCULAR; INTRAVENOUS at 11:34

## 2018-01-01 RX ADMIN — MICONAZOLE NITRATE: 20 POWDER TOPICAL at 09:43

## 2018-01-01 RX ADMIN — SODIUM CHLORIDE 15 ML: 900 IRRIGANT IRRIGATION at 18:24

## 2018-01-01 RX ADMIN — PROPOFOL 50 MG: 10 INJECTION, EMULSION INTRAVENOUS at 12:59

## 2018-01-01 RX ADMIN — LINEZOLID 600 MG: 600 INJECTION, SOLUTION INTRAVENOUS at 09:46

## 2018-01-01 RX ADMIN — ALUMINUM HYDROXIDE, MAGNESIUM HYDROXIDE, AND SIMETHICONE 30 ML: 200; 200; 20 SUSPENSION ORAL at 19:57

## 2018-01-01 RX ADMIN — SODIUM CHLORIDE 15 ML: 900 IRRIGANT IRRIGATION at 20:55

## 2018-01-01 RX ADMIN — URSODIOL 250 MG: 250 TABLET, FILM COATED ORAL at 06:24

## 2018-01-01 RX ADMIN — PROPRANOLOL HYDROCHLORIDE 20 MG: 20 TABLET ORAL at 20:15

## 2018-01-01 RX ADMIN — SULFAMETHOXAZOLE AND TRIMETHOPRIM 1 TABLET: 800; 160 TABLET ORAL at 21:33

## 2018-01-01 RX ADMIN — FUROSEMIDE 20 MG: 20 TABLET ORAL at 09:06

## 2018-01-01 RX ADMIN — VALACYCLOVIR HYDROCHLORIDE 500 MG: 500 TABLET, FILM COATED ORAL at 12:33

## 2018-01-01 RX ADMIN — VANCOMYCIN HYDROCHLORIDE 1250 MG: 10 INJECTION, POWDER, LYOPHILIZED, FOR SOLUTION INTRAVENOUS at 16:27

## 2018-01-01 RX ADMIN — BACLOFEN 10 MG: 10 TABLET ORAL at 08:10

## 2018-01-01 RX ADMIN — LINEZOLID 600 MG: 600 INJECTION, SOLUTION INTRAVENOUS at 21:46

## 2018-01-01 RX ADMIN — SODIUM CHLORIDE 250 ML: 9 INJECTION, SOLUTION INTRAVENOUS at 06:52

## 2018-01-01 RX ADMIN — CASTOR OIL AND BALSAM, PERU: 788; 87 OINTMENT TOPICAL at 08:37

## 2018-01-01 RX ADMIN — OXYCODONE HYDROCHLORIDE 5 MG: 5 TABLET ORAL at 06:53

## 2018-01-01 RX ADMIN — ACYCLOVIR SODIUM 450 MG: 50 INJECTION, SOLUTION INTRAVENOUS at 13:03

## 2018-01-01 RX ADMIN — PROPRANOLOL HYDROCHLORIDE 20 MG: 20 TABLET ORAL at 09:13

## 2018-01-01 RX ADMIN — ONDANSETRON 8 MG: 2 INJECTION INTRAMUSCULAR; INTRAVENOUS at 13:59

## 2018-01-01 RX ADMIN — URSODIOL 250 MG: 250 TABLET, FILM COATED ORAL at 21:08

## 2018-01-01 RX ADMIN — BACLOFEN 10 MG: 10 TABLET ORAL at 09:54

## 2018-01-01 RX ADMIN — TACROLIMUS 1 MG: 1 CAPSULE ORAL at 21:43

## 2018-01-01 RX ADMIN — ALBUMIN (HUMAN) 12.5 G: 0.25 INJECTION, SOLUTION INTRAVENOUS at 15:02

## 2018-01-01 RX ADMIN — ENOXAPARIN SODIUM 40 MG: 40 INJECTION SUBCUTANEOUS at 08:53

## 2018-01-01 RX ADMIN — MORPHINE SULFATE 4 MG: 4 INJECTION INTRAVENOUS at 06:07

## 2018-01-01 RX ADMIN — BACLOFEN 10 MG: 10 TABLET ORAL at 14:18

## 2018-01-01 RX ADMIN — Medication 10 ML: at 19:56

## 2018-01-01 RX ADMIN — Medication 10 ML: at 21:33

## 2018-01-01 RX ADMIN — Medication 10 ML: at 20:07

## 2018-01-01 RX ADMIN — POTASSIUM CHLORIDE: 2 INJECTION, SOLUTION, CONCENTRATE INTRAVENOUS at 11:44

## 2018-01-01 RX ADMIN — MEROPENEM 1 G: 1 INJECTION, POWDER, FOR SOLUTION INTRAVENOUS at 07:04

## 2018-01-01 RX ADMIN — MICONAZOLE NITRATE: 20 POWDER TOPICAL at 20:48

## 2018-01-01 RX ADMIN — SODIUM CHLORIDE 15 ML: 900 IRRIGANT IRRIGATION at 06:00

## 2018-01-01 RX ADMIN — LORAZEPAM 0.5 MG: 2 INJECTION INTRAMUSCULAR; INTRAVENOUS at 21:26

## 2018-01-01 RX ADMIN — SODIUM CHLORIDE 250 ML: 9 INJECTION, SOLUTION INTRAVENOUS at 08:38

## 2018-01-01 RX ADMIN — ENOXAPARIN SODIUM 40 MG: 40 INJECTION SUBCUTANEOUS at 09:54

## 2018-01-01 RX ADMIN — PROPRANOLOL HYDROCHLORIDE 20 MG: 20 TABLET ORAL at 21:26

## 2018-01-01 RX ADMIN — BACLOFEN 10 MG: 10 TABLET ORAL at 21:24

## 2018-01-01 RX ADMIN — URSODIOL 250 MG: 250 TABLET, FILM COATED ORAL at 21:41

## 2018-01-01 RX ADMIN — DEXTROSE MONOHYDRATE: 50 INJECTION, SOLUTION INTRAVENOUS at 06:45

## 2018-01-01 RX ADMIN — URSODIOL 250 MG: 250 TABLET, FILM COATED ORAL at 21:36

## 2018-01-01 RX ADMIN — AMLODIPINE BESYLATE 5 MG: 5 TABLET ORAL at 07:57

## 2018-01-01 RX ADMIN — FUROSEMIDE 80 MG: 10 INJECTION, SOLUTION INTRAMUSCULAR; INTRAVENOUS at 19:49

## 2018-01-01 RX ADMIN — Medication 2500 ML/HR: at 01:19

## 2018-01-01 RX ADMIN — SODIUM CHLORIDE 15 ML: 900 IRRIGANT IRRIGATION at 17:33

## 2018-01-01 RX ADMIN — INSULIN HUMAN 10 UNITS: 100 INJECTION, SOLUTION PARENTERAL at 01:56

## 2018-01-01 RX ADMIN — Medication 10 ML: at 07:56

## 2018-01-01 RX ADMIN — URSODIOL 250 MG: 250 TABLET, FILM COATED ORAL at 21:07

## 2018-01-01 RX ADMIN — TACROLIMUS 1 MG: 1 CAPSULE ORAL at 09:33

## 2018-01-01 RX ADMIN — ACYCLOVIR SODIUM 350 MG: 50 INJECTION, SOLUTION INTRAVENOUS at 18:40

## 2018-01-01 RX ADMIN — PIPERACILLIN SODIUM,TAZOBACTAM SODIUM 4.5 G: 4; .5 INJECTION, POWDER, FOR SOLUTION INTRAVENOUS at 05:02

## 2018-01-01 RX ADMIN — PROPOFOL 20 MCG/KG/MIN: 10 INJECTION, EMULSION INTRAVENOUS at 21:59

## 2018-01-01 RX ADMIN — PANTOPRAZOLE SODIUM 40 MG: 40 INJECTION, POWDER, FOR SOLUTION INTRAVENOUS at 09:59

## 2018-01-01 RX ADMIN — ACETAMINOPHEN 650 MG: 325 TABLET, FILM COATED ORAL at 23:06

## 2018-01-01 RX ADMIN — ZOLPIDEM TARTRATE 5 MG: 5 TABLET ORAL at 22:53

## 2018-01-01 RX ADMIN — SODIUM CHLORIDE 250 ML: 9 INJECTION, SOLUTION INTRAVENOUS at 10:00

## 2018-01-01 RX ADMIN — URSODIOL 250 MG: 250 TABLET, FILM COATED ORAL at 17:00

## 2018-01-01 RX ADMIN — PANTOPRAZOLE SODIUM 40 MG: 40 TABLET, DELAYED RELEASE ORAL at 06:48

## 2018-01-01 RX ADMIN — URSODIOL 250 MG: 250 TABLET, FILM COATED ORAL at 11:02

## 2018-01-01 RX ADMIN — LINEZOLID 600 MG: 600 INJECTION, SOLUTION INTRAVENOUS at 10:42

## 2018-01-01 RX ADMIN — INSULIN HUMAN 4 UNITS: 100 INJECTION, SOLUTION PARENTERAL at 06:45

## 2018-01-01 RX ADMIN — MORPHINE SULFATE 2 MG: 2 INJECTION, SOLUTION INTRAMUSCULAR; INTRAVENOUS at 00:55

## 2018-01-01 RX ADMIN — Medication 2500 ML/HR: at 18:53

## 2018-01-01 RX ADMIN — FUROSEMIDE 40 MG: 10 INJECTION, SOLUTION INTRAMUSCULAR; INTRAVENOUS at 18:00

## 2018-01-01 RX ADMIN — ZOLPIDEM TARTRATE 5 MG: 5 TABLET ORAL at 22:38

## 2018-01-01 RX ADMIN — SODIUM CHLORIDE 15 ML: 900 IRRIGANT IRRIGATION at 11:10

## 2018-01-01 RX ADMIN — MICONAZOLE NITRATE: 20 POWDER TOPICAL at 23:00

## 2018-01-01 RX ADMIN — URSODIOL 250 MG: 250 TABLET, FILM COATED ORAL at 21:32

## 2018-01-01 RX ADMIN — MICONAZOLE NITRATE: 20 POWDER TOPICAL at 21:15

## 2018-01-01 RX ADMIN — Medication 2500 ML/HR: at 07:45

## 2018-01-01 RX ADMIN — SODIUM CHLORIDE 15 ML: 900 IRRIGANT IRRIGATION at 21:00

## 2018-01-01 RX ADMIN — MICONAZOLE NITRATE: 20 POWDER TOPICAL at 20:24

## 2018-01-01 RX ADMIN — BACLOFEN 5 MG: 10 TABLET ORAL at 21:42

## 2018-01-01 RX ADMIN — LOPERAMIDE HYDROCHLORIDE 2 MG: 2 CAPSULE ORAL at 13:08

## 2018-01-01 RX ADMIN — PIPERACILLIN SODIUM,TAZOBACTAM SODIUM 3.38 G: 3; .375 INJECTION, POWDER, FOR SOLUTION INTRAVENOUS at 23:05

## 2018-01-01 RX ADMIN — URSODIOL 250 MG: 250 TABLET, FILM COATED ORAL at 11:44

## 2018-01-01 RX ADMIN — LOPERAMIDE HYDROCHLORIDE 2 MG: 2 CAPSULE ORAL at 09:47

## 2018-01-01 RX ADMIN — BACLOFEN 5 MG: 10 TABLET ORAL at 10:10

## 2018-01-01 RX ADMIN — MORPHINE SULFATE 4 MG: 4 INJECTION INTRAVENOUS at 07:34

## 2018-01-01 RX ADMIN — PANTOPRAZOLE SODIUM 40 MG: 40 GRANULE, DELAYED RELEASE ORAL at 09:43

## 2018-01-01 RX ADMIN — DIPHENHYDRAMINE HYDROCHLORIDE 12.5 MG: 50 INJECTION, SOLUTION INTRAMUSCULAR; INTRAVENOUS at 06:07

## 2018-01-01 RX ADMIN — LEVALBUTEROL HYDROCHLORIDE 0.63 MG: 0.63 SOLUTION RESPIRATORY (INHALATION) at 06:13

## 2018-01-01 RX ADMIN — BACLOFEN 5 MG: 10 TABLET ORAL at 08:44

## 2018-01-01 RX ADMIN — SODIUM CHLORIDE 15 ML: 900 IRRIGANT IRRIGATION at 06:40

## 2018-01-01 RX ADMIN — FUROSEMIDE 40 MG: 10 INJECTION, SOLUTION INTRAMUSCULAR; INTRAVENOUS at 09:03

## 2018-01-01 RX ADMIN — Medication 10 ML: at 09:30

## 2018-01-01 RX ADMIN — SODIUM CHLORIDE 15 ML: 900 IRRIGANT IRRIGATION at 11:05

## 2018-01-01 RX ADMIN — DIPHENHYDRAMINE HYDROCHLORIDE 12.5 MG: 50 INJECTION, SOLUTION INTRAMUSCULAR; INTRAVENOUS at 06:11

## 2018-01-01 RX ADMIN — URSODIOL 250 MG: 250 TABLET, FILM COATED ORAL at 12:12

## 2018-01-01 RX ADMIN — BACLOFEN 10 MG: 10 TABLET ORAL at 19:55

## 2018-01-01 RX ADMIN — PROPRANOLOL HYDROCHLORIDE 80 MG: 20 TABLET ORAL at 09:03

## 2018-01-01 RX ADMIN — URSODIOL 250 MG: 250 TABLET, FILM COATED ORAL at 12:15

## 2018-01-01 RX ADMIN — PROPRANOLOL HYDROCHLORIDE 20 MG: 20 TABLET ORAL at 08:15

## 2018-01-01 RX ADMIN — Medication 10 ML: at 20:15

## 2018-01-01 RX ADMIN — BACLOFEN 10 MG: 10 TABLET ORAL at 14:09

## 2018-01-01 RX ADMIN — PIPERACILLIN SODIUM,TAZOBACTAM SODIUM 4.5 G: 4; .5 INJECTION, POWDER, FOR SOLUTION INTRAVENOUS at 11:42

## 2018-01-01 RX ADMIN — OXYCODONE HYDROCHLORIDE 5 MG: 5 TABLET ORAL at 04:00

## 2018-01-01 RX ADMIN — DIPHENHYDRAMINE HCL 25 MG: 25 TABLET ORAL at 08:54

## 2018-01-01 RX ADMIN — MICONAZOLE NITRATE: 20 POWDER TOPICAL at 20:43

## 2018-01-01 RX ADMIN — Medication 2500 ML/HR: at 23:55

## 2018-01-01 RX ADMIN — MICONAZOLE NITRATE: 20 POWDER TOPICAL at 10:06

## 2018-01-01 RX ADMIN — ENOXAPARIN SODIUM 40 MG: 40 INJECTION SUBCUTANEOUS at 09:18

## 2018-01-01 RX ADMIN — URSODIOL 250 MG: 250 TABLET, FILM COATED ORAL at 08:38

## 2018-01-01 RX ADMIN — PANTOPRAZOLE SODIUM 40 MG: 40 INJECTION, POWDER, FOR SOLUTION INTRAVENOUS at 08:44

## 2018-01-01 RX ADMIN — BACLOFEN 10 MG: 10 TABLET ORAL at 20:51

## 2018-01-01 RX ADMIN — ACETAMINOPHEN 650 MG: 325 TABLET ORAL at 19:47

## 2018-01-01 RX ADMIN — BACLOFEN 10 MG: 10 TABLET ORAL at 15:37

## 2018-01-01 RX ADMIN — TBO-FILGRASTIM 300 MCG: 300 INJECTION, SOLUTION SUBCUTANEOUS at 18:57

## 2018-01-01 RX ADMIN — Medication 10 ML: at 13:45

## 2018-01-01 RX ADMIN — Medication 10 ML: at 09:33

## 2018-01-01 RX ADMIN — Medication 1 MG: at 20:55

## 2018-01-01 RX ADMIN — VANCOMYCIN HYDROCHLORIDE 1250 MG: 10 INJECTION, POWDER, LYOPHILIZED, FOR SOLUTION INTRAVENOUS at 03:21

## 2018-01-01 RX ADMIN — TBO-FILGRASTIM 300 MCG: 300 INJECTION, SOLUTION SUBCUTANEOUS at 18:40

## 2018-01-01 RX ADMIN — MEROPENEM 1 G: 1 INJECTION, POWDER, FOR SOLUTION INTRAVENOUS at 23:40

## 2018-01-01 RX ADMIN — BACLOFEN 5 MG: 10 TABLET ORAL at 22:10

## 2018-01-01 RX ADMIN — POTASSIUM CHLORIDE 20 MEQ: 1.5 POWDER, FOR SOLUTION ORAL at 09:06

## 2018-01-01 RX ADMIN — SODIUM CHLORIDE: 9 INJECTION, SOLUTION INTRAVENOUS at 06:52

## 2018-01-01 RX ADMIN — SODIUM CHLORIDE 15 ML: 900 IRRIGANT IRRIGATION at 16:40

## 2018-01-01 RX ADMIN — MORPHINE SULFATE 4 MG: 4 INJECTION INTRAVENOUS at 21:14

## 2018-01-01 RX ADMIN — URSODIOL 250 MG: 250 TABLET, FILM COATED ORAL at 15:53

## 2018-01-01 RX ADMIN — SODIUM CHLORIDE 15 ML: 900 IRRIGANT IRRIGATION at 12:12

## 2018-01-01 RX ADMIN — SULFAMETHOXAZOLE AND TRIMETHOPRIM 1 TABLET: 800; 160 TABLET ORAL at 12:34

## 2018-01-01 RX ADMIN — DIPHENHYDRAMINE HCL 25 MG: 25 TABLET ORAL at 04:59

## 2018-01-01 RX ADMIN — MEROPENEM 1 G: 1 INJECTION, POWDER, FOR SOLUTION INTRAVENOUS at 08:06

## 2018-01-01 RX ADMIN — SODIUM CHLORIDE 250 ML: 9 INJECTION, SOLUTION INTRAVENOUS at 10:04

## 2018-01-01 RX ADMIN — MEROPENEM 1 G: 1 INJECTION, POWDER, FOR SOLUTION INTRAVENOUS at 18:24

## 2018-01-01 RX ADMIN — PROPOFOL 10 MCG/KG/MIN: 10 INJECTION, EMULSION INTRAVENOUS at 13:14

## 2018-01-01 RX ADMIN — POTASSIUM CHLORIDE 20 MEQ: 400 INJECTION, SOLUTION INTRAVENOUS at 07:53

## 2018-01-01 RX ADMIN — LOPERAMIDE HYDROCHLORIDE 2 MG: 2 CAPSULE ORAL at 08:37

## 2018-01-01 RX ADMIN — POTASSIUM CHLORIDE: 2 INJECTION, SOLUTION, CONCENTRATE INTRAVENOUS at 03:40

## 2018-01-01 RX ADMIN — SODIUM CHLORIDE 15 ML: 900 IRRIGANT IRRIGATION at 11:11

## 2018-01-01 RX ADMIN — SODIUM CHLORIDE 15 ML: 900 IRRIGANT IRRIGATION at 08:01

## 2018-01-01 RX ADMIN — FLUCONAZOLE 400 MG: 200 TABLET ORAL at 09:54

## 2018-01-01 RX ADMIN — DEXTROSE MONOHYDRATE: 50 INJECTION, SOLUTION INTRAVENOUS at 12:30

## 2018-01-01 RX ADMIN — SODIUM BICARBONATE: 84 INJECTION, SOLUTION INTRAVENOUS at 02:00

## 2018-01-01 RX ADMIN — PROPRANOLOL HYDROCHLORIDE 80 MG: 20 TABLET ORAL at 08:54

## 2018-01-01 RX ADMIN — CASTOR OIL AND BALSAM, PERU: 788; 87 OINTMENT TOPICAL at 08:12

## 2018-01-01 RX ADMIN — Medication 0.5 MG: at 11:00

## 2018-01-01 RX ADMIN — CASTOR OIL AND BALSAM, PERU: 788; 87 OINTMENT TOPICAL at 10:07

## 2018-01-01 RX ADMIN — FUROSEMIDE 10 MG/HR: 10 INJECTION, SOLUTION INTRAMUSCULAR; INTRAVENOUS at 21:09

## 2018-01-01 RX ADMIN — Medication 2500 ML/HR: at 23:21

## 2018-01-01 RX ADMIN — ZOLPIDEM TARTRATE 5 MG: 5 TABLET ORAL at 00:11

## 2018-01-01 RX ADMIN — MEROPENEM 1 G: 1 INJECTION, POWDER, FOR SOLUTION INTRAVENOUS at 09:02

## 2018-01-01 RX ADMIN — Medication 10 ML: at 08:10

## 2018-01-01 RX ADMIN — TERBINAFINE HYDROCHLORIDE: 1 CREAM TOPICAL at 20:25

## 2018-01-01 RX ADMIN — URSODIOL 250 MG: 250 TABLET, FILM COATED ORAL at 08:15

## 2018-01-01 RX ADMIN — LOPERAMIDE HYDROCHLORIDE 2 MG: 2 CAPSULE ORAL at 12:47

## 2018-01-01 RX ADMIN — FLUCONAZOLE 400 MG: 200 TABLET ORAL at 09:30

## 2018-01-01 RX ADMIN — HEPARIN SODIUM 3200 UNITS: 1000 INJECTION INTRAVENOUS; SUBCUTANEOUS at 08:32

## 2018-01-01 RX ADMIN — Medication 1 MG: at 15:56

## 2018-01-01 RX ADMIN — SODIUM CHLORIDE 15 ML: 900 IRRIGANT IRRIGATION at 11:33

## 2018-01-01 RX ADMIN — SODIUM CHLORIDE 15 ML: 900 IRRIGANT IRRIGATION at 16:23

## 2018-01-01 RX ADMIN — TACROLIMUS 1.5 MG: 0.5 CAPSULE ORAL at 09:07

## 2018-01-01 RX ADMIN — INSULIN HUMAN 4 UNITS: 100 INJECTION, SOLUTION PARENTERAL at 03:04

## 2018-01-01 RX ADMIN — ONDANSETRON 8 MG: 2 INJECTION INTRAMUSCULAR; INTRAVENOUS at 05:31

## 2018-01-01 RX ADMIN — MICONAZOLE NITRATE: 20 POWDER TOPICAL at 08:51

## 2018-01-01 RX ADMIN — INSULIN HUMAN 4 UNITS: 100 INJECTION, SOLUTION PARENTERAL at 00:39

## 2018-01-01 RX ADMIN — PANTOPRAZOLE SODIUM 40 MG: 40 INJECTION, POWDER, FOR SOLUTION INTRAVENOUS at 09:25

## 2018-01-01 RX ADMIN — URSODIOL 250 MG: 250 TABLET, FILM COATED ORAL at 17:32

## 2018-01-01 RX ADMIN — LINEZOLID 600 MG: 600 INJECTION, SOLUTION INTRAVENOUS at 20:33

## 2018-01-01 RX ADMIN — PROPOFOL 10 MCG/KG/MIN: 10 INJECTION, EMULSION INTRAVENOUS at 09:40

## 2018-01-01 RX ADMIN — FUROSEMIDE 20 MG: 10 INJECTION, SOLUTION INTRAMUSCULAR; INTRAVENOUS at 18:05

## 2018-01-01 RX ADMIN — FUROSEMIDE 20 MG: 10 INJECTION, SOLUTION INTRAMUSCULAR; INTRAVENOUS at 09:25

## 2018-01-01 RX ADMIN — ZOLPIDEM TARTRATE 5 MG: 5 TABLET ORAL at 00:50

## 2018-01-01 RX ADMIN — LOPERAMIDE HYDROCHLORIDE 2 MG: 2 CAPSULE ORAL at 13:29

## 2018-01-01 RX ADMIN — LINEZOLID 600 MG: 600 INJECTION, SOLUTION INTRAVENOUS at 21:45

## 2018-01-01 RX ADMIN — ACETAMINOPHEN 650 MG: 325 TABLET, FILM COATED ORAL at 06:21

## 2018-01-01 RX ADMIN — MICONAZOLE NITRATE: 20 POWDER TOPICAL at 11:17

## 2018-01-01 RX ADMIN — VALACYCLOVIR HYDROCHLORIDE 500 MG: 500 TABLET, FILM COATED ORAL at 08:57

## 2018-01-01 RX ADMIN — Medication 10 ML: at 21:17

## 2018-01-01 RX ADMIN — MEROPENEM 1 G: 1 INJECTION, POWDER, FOR SOLUTION INTRAVENOUS at 06:34

## 2018-01-01 RX ADMIN — FUROSEMIDE 20 MG: 10 INJECTION, SOLUTION INTRAMUSCULAR; INTRAVENOUS at 08:38

## 2018-01-01 RX ADMIN — ENOXAPARIN SODIUM 40 MG: 40 INJECTION SUBCUTANEOUS at 09:01

## 2018-01-01 RX ADMIN — CALCIUM GLUCONATE: 94 INJECTION, SOLUTION INTRAVENOUS at 16:21

## 2018-01-01 RX ADMIN — VANCOMYCIN HYDROCHLORIDE 1250 MG: 10 INJECTION, POWDER, LYOPHILIZED, FOR SOLUTION INTRAVENOUS at 15:32

## 2018-01-01 RX ADMIN — Medication 10 ML: at 20:21

## 2018-01-01 RX ADMIN — ALBUMIN (HUMAN) 12.5 G: 0.25 INJECTION, SOLUTION INTRAVENOUS at 20:15

## 2018-01-01 RX ADMIN — PIPERACILLIN SODIUM,TAZOBACTAM SODIUM 3.38 G: 3; .375 INJECTION, POWDER, FOR SOLUTION INTRAVENOUS at 15:20

## 2018-01-01 RX ADMIN — Medication 1 MG: at 12:15

## 2018-01-01 RX ADMIN — ZOLPIDEM TARTRATE 5 MG: 5 TABLET ORAL at 00:10

## 2018-01-01 RX ADMIN — FUROSEMIDE 5 MG/HR: 10 INJECTION, SOLUTION INTRAMUSCULAR; INTRAVENOUS at 10:10

## 2018-01-01 RX ADMIN — BACLOFEN 10 MG: 10 TABLET ORAL at 14:13

## 2018-01-01 RX ADMIN — Medication 10 ML: at 20:54

## 2018-01-01 RX ADMIN — Medication 10 ML: at 21:03

## 2018-01-01 RX ADMIN — AMLODIPINE BESYLATE 5 MG: 5 TABLET ORAL at 08:31

## 2018-01-01 RX ADMIN — SODIUM CHLORIDE 250 ML: 9 INJECTION, SOLUTION INTRAVENOUS at 06:00

## 2018-01-01 RX ADMIN — BACLOFEN 10 MG: 10 TABLET ORAL at 15:05

## 2018-01-01 RX ADMIN — URSODIOL 250 MG: 250 TABLET, FILM COATED ORAL at 09:27

## 2018-01-01 RX ADMIN — Medication 10 ML: at 08:46

## 2018-01-01 RX ADMIN — URSODIOL 250 MG: 250 TABLET, FILM COATED ORAL at 20:51

## 2018-01-01 RX ADMIN — TACROLIMUS 2.5 MG: 0.5 CAPSULE ORAL at 09:53

## 2018-01-01 RX ADMIN — Medication 0.5 MG: at 22:30

## 2018-01-01 RX ADMIN — LEVALBUTEROL HYDROCHLORIDE 0.63 MG: 0.63 SOLUTION RESPIRATORY (INHALATION) at 20:35

## 2018-01-01 RX ADMIN — URSODIOL 250 MG: 250 TABLET, FILM COATED ORAL at 21:17

## 2018-01-01 RX ADMIN — URSODIOL 250 MG: 250 TABLET, FILM COATED ORAL at 08:47

## 2018-01-01 RX ADMIN — Medication 500 ML/HR: at 18:52

## 2018-01-01 RX ADMIN — CASTOR OIL AND BALSAM, PERU: 788; 87 OINTMENT TOPICAL at 21:28

## 2018-01-01 RX ADMIN — URSODIOL 250 MG: 250 TABLET, FILM COATED ORAL at 11:25

## 2018-01-01 RX ADMIN — POTASSIUM CHLORIDE: 2 INJECTION, SOLUTION, CONCENTRATE INTRAVENOUS at 23:45

## 2018-01-01 RX ADMIN — ALBUMIN (HUMAN) 12.5 G: 0.25 INJECTION, SOLUTION INTRAVENOUS at 09:27

## 2018-01-01 RX ADMIN — Medication 0.5 MG: at 10:32

## 2018-01-01 RX ADMIN — Medication 10 ML: at 10:15

## 2018-01-01 RX ADMIN — SODIUM CHLORIDE 15 ML: 900 IRRIGANT IRRIGATION at 11:29

## 2018-01-01 RX ADMIN — PANTOPRAZOLE SODIUM 40 MG: 40 TABLET, DELAYED RELEASE ORAL at 06:52

## 2018-01-01 RX ADMIN — BACLOFEN 5 MG: 10 TABLET ORAL at 21:35

## 2018-01-01 RX ADMIN — BACLOFEN 10 MG: 10 TABLET ORAL at 21:41

## 2018-01-01 RX ADMIN — MICAFUNGIN SODIUM 150 MG: 10 INJECTION, POWDER, LYOPHILIZED, FOR SOLUTION INTRAVENOUS at 13:38

## 2018-01-01 RX ADMIN — Medication 1 MG: at 08:15

## 2018-01-01 RX ADMIN — Medication 10 ML: at 09:00

## 2018-01-01 RX ADMIN — URSODIOL 250 MG: 250 TABLET, FILM COATED ORAL at 16:16

## 2018-01-01 RX ADMIN — TACROLIMUS 1.5 MG: 0.5 CAPSULE ORAL at 08:52

## 2018-01-01 RX ADMIN — SODIUM CHLORIDE 15 ML: 900 IRRIGANT IRRIGATION at 17:41

## 2018-01-01 RX ADMIN — POTASSIUM CHLORIDE 20 MEQ: 400 INJECTION, SOLUTION INTRAVENOUS at 10:59

## 2018-01-01 RX ADMIN — MEROPENEM 1 G: 1 INJECTION, POWDER, FOR SOLUTION INTRAVENOUS at 08:59

## 2018-01-01 RX ADMIN — VALACYCLOVIR HYDROCHLORIDE 500 MG: 500 TABLET, FILM COATED ORAL at 08:37

## 2018-01-01 RX ADMIN — MICONAZOLE NITRATE: 20 POWDER TOPICAL at 21:27

## 2018-01-01 RX ADMIN — Medication 10 ML: at 22:30

## 2018-01-01 RX ADMIN — SODIUM CHLORIDE 15 ML: 900 IRRIGANT IRRIGATION at 16:58

## 2018-01-01 RX ADMIN — URSODIOL 250 MG: 250 TABLET, FILM COATED ORAL at 14:01

## 2018-01-01 RX ADMIN — ZOLPIDEM TARTRATE 5 MG: 5 TABLET ORAL at 22:42

## 2018-01-01 RX ADMIN — CALCIUM GLUCONATE 1 G: 98 INJECTION, SOLUTION INTRAVENOUS at 21:55

## 2018-01-01 RX ADMIN — LOPERAMIDE HYDROCHLORIDE 2 MG: 2 CAPSULE ORAL at 16:09

## 2018-01-01 RX ADMIN — Medication 10 ML: at 08:44

## 2018-01-01 RX ADMIN — ACYCLOVIR SODIUM 350 MG: 50 INJECTION, SOLUTION INTRAVENOUS at 16:18

## 2018-01-01 RX ADMIN — URSODIOL 250 MG: 250 TABLET, FILM COATED ORAL at 11:42

## 2018-01-01 RX ADMIN — Medication 1 MG: at 09:22

## 2018-01-01 RX ADMIN — TACROLIMUS 1 MG: 1 CAPSULE ORAL at 08:39

## 2018-01-01 RX ADMIN — Medication 5 ML: at 20:03

## 2018-01-01 RX ADMIN — SODIUM CHLORIDE 15 ML: 900 IRRIGANT IRRIGATION at 18:29

## 2018-01-01 RX ADMIN — LOPERAMIDE HYDROCHLORIDE 2 MG: 2 CAPSULE ORAL at 23:49

## 2018-01-01 RX ADMIN — SODIUM CHLORIDE: 9 INJECTION, SOLUTION INTRAVENOUS at 21:19

## 2018-01-01 RX ADMIN — ONDANSETRON HYDROCHLORIDE 8 MG: 8 TABLET, FILM COATED ORAL at 11:49

## 2018-01-01 RX ADMIN — BACLOFEN 5 MG: 10 TABLET ORAL at 20:47

## 2018-01-01 RX ADMIN — TACROLIMUS 0.5 MG: 0.5 CAPSULE ORAL at 15:19

## 2018-01-01 RX ADMIN — MORPHINE SULFATE 4 MG: 4 INJECTION INTRAVENOUS at 13:26

## 2018-01-01 RX ADMIN — SODIUM CHLORIDE 15 ML: 900 IRRIGANT IRRIGATION at 20:06

## 2018-01-01 RX ADMIN — SODIUM CHLORIDE 15 ML: 900 IRRIGANT IRRIGATION at 16:00

## 2018-01-01 RX ADMIN — FUROSEMIDE 20 MG: 20 TABLET ORAL at 08:37

## 2018-01-01 RX ADMIN — Medication 1 MG: at 16:57

## 2018-01-01 RX ADMIN — LEVOFLOXACIN 500 MG: 500 TABLET, FILM COATED ORAL at 21:40

## 2018-01-01 RX ADMIN — INSULIN HUMAN 6 UNITS: 100 INJECTION, SOLUTION PARENTERAL at 09:51

## 2018-01-01 RX ADMIN — MICAFUNGIN SODIUM 150 MG: 10 INJECTION, POWDER, LYOPHILIZED, FOR SOLUTION INTRAVENOUS at 20:52

## 2018-01-01 RX ADMIN — XENON XE-133 10 MILLICURIE: 10 GAS RESPIRATORY (INHALATION) at 12:45

## 2018-01-01 RX ADMIN — Medication 1 MG: at 21:17

## 2018-01-01 RX ADMIN — DIPHENHYDRAMINE HYDROCHLORIDE 12.5 MG: 50 INJECTION, SOLUTION INTRAMUSCULAR; INTRAVENOUS at 07:10

## 2018-01-01 RX ADMIN — SODIUM CHLORIDE 15 ML: 900 IRRIGANT IRRIGATION at 21:55

## 2018-01-01 RX ADMIN — Medication 10 ML: at 11:36

## 2018-01-01 RX ADMIN — BACLOFEN 5 MG: 10 TABLET ORAL at 15:38

## 2018-01-01 RX ADMIN — URSODIOL 250 MG: 250 TABLET, FILM COATED ORAL at 17:40

## 2018-01-01 RX ADMIN — BACLOFEN 5 MG: 10 TABLET ORAL at 14:07

## 2018-01-01 RX ADMIN — URSODIOL 250 MG: 250 TABLET, FILM COATED ORAL at 12:33

## 2018-01-01 RX ADMIN — PIPERACILLIN SODIUM,TAZOBACTAM SODIUM 4.5 G: 4; .5 INJECTION, POWDER, FOR SOLUTION INTRAVENOUS at 05:46

## 2018-01-01 RX ADMIN — Medication 10 ML: at 08:37

## 2018-01-01 RX ADMIN — SODIUM CHLORIDE 250 ML: 9 INJECTION, SOLUTION INTRAVENOUS at 05:45

## 2018-01-01 RX ADMIN — ACYCLOVIR SODIUM 350 MG: 50 INJECTION, SOLUTION INTRAVENOUS at 18:37

## 2018-01-01 RX ADMIN — MICONAZOLE NITRATE: 20 POWDER TOPICAL at 20:15

## 2018-01-01 RX ADMIN — MEROPENEM 1 G: 1 INJECTION, POWDER, FOR SOLUTION INTRAVENOUS at 13:00

## 2018-01-01 RX ADMIN — URSODIOL 250 MG: 250 TABLET, FILM COATED ORAL at 20:35

## 2018-01-01 RX ADMIN — SODIUM CHLORIDE 15 ML: 900 IRRIGANT IRRIGATION at 09:26

## 2018-01-01 RX ADMIN — Medication 500 ML/HR: at 20:38

## 2018-01-01 RX ADMIN — INSULIN HUMAN 2 UNITS: 100 INJECTION, SOLUTION PARENTERAL at 12:10

## 2018-01-01 RX ADMIN — LOPERAMIDE HYDROCHLORIDE 2 MG: 2 CAPSULE ORAL at 16:59

## 2018-01-01 RX ADMIN — VALACYCLOVIR HYDROCHLORIDE 500 MG: 500 TABLET, FILM COATED ORAL at 09:53

## 2018-01-01 RX ADMIN — Medication 5 ML: at 14:00

## 2018-01-01 RX ADMIN — URSODIOL 250 MG: 250 TABLET, FILM COATED ORAL at 09:34

## 2018-01-01 RX ADMIN — ONDANSETRON 8 MG: 2 INJECTION INTRAMUSCULAR; INTRAVENOUS at 15:17

## 2018-01-01 RX ADMIN — SODIUM CHLORIDE 15 ML: 900 IRRIGANT IRRIGATION at 20:50

## 2018-01-01 RX ADMIN — Medication 1 MG: at 16:22

## 2018-01-01 RX ADMIN — MICAFUNGIN SODIUM 150 MG: 10 INJECTION, POWDER, LYOPHILIZED, FOR SOLUTION INTRAVENOUS at 10:50

## 2018-01-01 RX ADMIN — SODIUM CHLORIDE 250 ML: 9 INJECTION, SOLUTION INTRAVENOUS at 08:43

## 2018-01-01 RX ADMIN — MEROPENEM 1 G: 1 INJECTION, POWDER, FOR SOLUTION INTRAVENOUS at 08:46

## 2018-01-01 RX ADMIN — SIMETHICONE 80 MG: 80 TABLET, CHEWABLE ORAL at 20:01

## 2018-01-01 RX ADMIN — CASTOR OIL AND BALSAM, PERU: 788; 87 OINTMENT TOPICAL at 12:21

## 2018-01-01 RX ADMIN — Medication 10 ML: at 19:57

## 2018-01-01 RX ADMIN — INSULIN HUMAN 2 UNITS: 100 INJECTION, SOLUTION PARENTERAL at 12:23

## 2018-01-01 RX ADMIN — Medication 2000 ML/HR: at 03:12

## 2018-01-01 RX ADMIN — URSODIOL 250 MG: 250 TABLET, FILM COATED ORAL at 20:08

## 2018-01-01 RX ADMIN — Medication 10 ML: at 09:43

## 2018-01-01 RX ADMIN — FUROSEMIDE 20 MG: 20 TABLET ORAL at 09:55

## 2018-01-01 RX ADMIN — SODIUM CHLORIDE 15 ML: 900 IRRIGANT IRRIGATION at 17:50

## 2018-01-01 RX ADMIN — POTASSIUM CHLORIDE AND SODIUM CHLORIDE: 900; 150 INJECTION, SOLUTION INTRAVENOUS at 03:06

## 2018-01-01 RX ADMIN — DIPHENHYDRAMINE HYDROCHLORIDE 12.5 MG: 50 INJECTION, SOLUTION INTRAMUSCULAR; INTRAVENOUS at 06:01

## 2018-01-01 RX ADMIN — ONDANSETRON 8 MG: 2 INJECTION INTRAMUSCULAR; INTRAVENOUS at 09:17

## 2018-01-01 RX ADMIN — MICONAZOLE NITRATE: 20 POWDER TOPICAL at 09:54

## 2018-01-01 RX ADMIN — PANTOPRAZOLE SODIUM 40 MG: 40 INJECTION, POWDER, FOR SOLUTION INTRAVENOUS at 09:23

## 2018-01-01 RX ADMIN — FUROSEMIDE 20 MG: 20 TABLET ORAL at 17:39

## 2018-01-01 RX ADMIN — MEROPENEM 1 G: 1 INJECTION, POWDER, FOR SOLUTION INTRAVENOUS at 16:00

## 2018-01-01 RX ADMIN — HYDROCORTISONE SODIUM SUCCINATE 100 MG: 100 INJECTION, POWDER, FOR SOLUTION INTRAMUSCULAR; INTRAVENOUS at 19:47

## 2018-01-01 RX ADMIN — TACROLIMUS 1 MG: 1 CAPSULE ORAL at 21:36

## 2018-01-01 RX ADMIN — Medication 5 ML: at 15:39

## 2018-01-01 RX ADMIN — SODIUM CHLORIDE: 9 INJECTION, SOLUTION INTRAVENOUS at 23:02

## 2018-01-01 RX ADMIN — MEROPENEM 1 G: 1 INJECTION, POWDER, FOR SOLUTION INTRAVENOUS at 14:30

## 2018-01-01 RX ADMIN — Medication 5 ML: at 22:50

## 2018-01-01 RX ADMIN — PROPRANOLOL HYDROCHLORIDE 80 MG: 20 TABLET ORAL at 08:32

## 2018-01-01 RX ADMIN — PANTOPRAZOLE SODIUM 40 MG: 40 INJECTION, POWDER, FOR SOLUTION INTRAVENOUS at 08:15

## 2018-01-01 RX ADMIN — Medication 2500 ML/HR: at 16:14

## 2018-01-01 RX ADMIN — POTASSIUM CHLORIDE 20 MEQ: 400 INJECTION, SOLUTION INTRAVENOUS at 06:32

## 2018-01-01 RX ADMIN — FUROSEMIDE 20 MG: 20 TABLET ORAL at 18:01

## 2018-01-01 RX ADMIN — BACLOFEN 10 MG: 10 TABLET ORAL at 20:03

## 2018-01-01 RX ADMIN — VALACYCLOVIR HYDROCHLORIDE 500 MG: 500 TABLET, FILM COATED ORAL at 20:35

## 2018-01-01 RX ADMIN — POTASSIUM CHLORIDE, DEXTROSE MONOHYDRATE AND SODIUM CHLORIDE 1000 ML: 75; 5; 450 INJECTION, SOLUTION INTRAVENOUS at 18:26

## 2018-01-01 RX ADMIN — URSODIOL 250 MG: 250 TABLET, FILM COATED ORAL at 20:47

## 2018-01-01 RX ADMIN — INSULIN HUMAN 4 UNITS: 100 INJECTION, SOLUTION PARENTERAL at 13:20

## 2018-01-01 RX ADMIN — PROPRANOLOL HYDROCHLORIDE 80 MG: 20 TABLET ORAL at 08:10

## 2018-01-01 RX ADMIN — MEROPENEM 1 G: 1 INJECTION, POWDER, FOR SOLUTION INTRAVENOUS at 14:37

## 2018-01-01 RX ADMIN — Medication 5 ML: at 02:36

## 2018-01-01 RX ADMIN — FUROSEMIDE 10 MG/HR: 10 INJECTION, SOLUTION INTRAMUSCULAR; INTRAVENOUS at 05:44

## 2018-01-01 RX ADMIN — AMLODIPINE BESYLATE 5 MG: 5 TABLET ORAL at 09:06

## 2018-01-01 RX ADMIN — LOPERAMIDE HYDROCHLORIDE 2 MG: 2 CAPSULE ORAL at 21:54

## 2018-01-01 RX ADMIN — BACLOFEN 10 MG: 10 TABLET ORAL at 21:36

## 2018-01-01 RX ADMIN — ACYCLOVIR SODIUM 450 MG: 50 INJECTION, SOLUTION INTRAVENOUS at 09:23

## 2018-01-01 RX ADMIN — PIPERACILLIN SODIUM,TAZOBACTAM SODIUM 3.38 G: 3; .375 INJECTION, POWDER, FOR SOLUTION INTRAVENOUS at 21:46

## 2018-01-01 RX ADMIN — SODIUM CHLORIDE: 9 INJECTION, SOLUTION INTRAVENOUS at 20:22

## 2018-01-01 RX ADMIN — Medication 5 ML: at 20:55

## 2018-01-01 RX ADMIN — Medication 0.5 MG: at 20:15

## 2018-01-01 RX ADMIN — SODIUM CHLORIDE 15 ML: 900 IRRIGANT IRRIGATION at 20:03

## 2018-01-01 RX ADMIN — URSODIOL 250 MG: 250 TABLET, FILM COATED ORAL at 12:00

## 2018-01-01 RX ADMIN — SODIUM CHLORIDE 15 ML: 900 IRRIGANT IRRIGATION at 21:41

## 2018-01-01 RX ADMIN — ENOXAPARIN SODIUM 40 MG: 40 INJECTION SUBCUTANEOUS at 08:10

## 2018-01-01 RX ADMIN — TERBINAFINE HYDROCHLORIDE: 1 CREAM TOPICAL at 20:06

## 2018-01-01 RX ADMIN — URSODIOL 250 MG: 250 TABLET, FILM COATED ORAL at 06:19

## 2018-01-01 RX ADMIN — AMLODIPINE BESYLATE 5 MG: 5 TABLET ORAL at 08:43

## 2018-01-01 RX ADMIN — BACLOFEN 5 MG: 10 TABLET ORAL at 20:35

## 2018-01-01 RX ADMIN — INSULIN HUMAN 2 UNITS: 100 INJECTION, SOLUTION PARENTERAL at 06:40

## 2018-01-01 RX ADMIN — MICONAZOLE NITRATE: 20 POWDER TOPICAL at 20:59

## 2018-01-01 RX ADMIN — PROPRANOLOL HYDROCHLORIDE 20 MG: 20 TABLET ORAL at 09:43

## 2018-01-01 RX ADMIN — VANCOMYCIN HYDROCHLORIDE 1250 MG: 10 INJECTION, POWDER, LYOPHILIZED, FOR SOLUTION INTRAVENOUS at 22:58

## 2018-01-01 RX ADMIN — TACROLIMUS 0.5 MG: 0.5 CAPSULE ORAL at 08:38

## 2018-01-01 RX ADMIN — CASTOR OIL AND BALSAM, PERU: 788; 87 OINTMENT TOPICAL at 09:35

## 2018-01-01 RX ADMIN — VALACYCLOVIR HYDROCHLORIDE 500 MG: 500 TABLET, FILM COATED ORAL at 08:53

## 2018-01-01 RX ADMIN — FLUDARABINE PHOSPHATE 55 MG: 25 INJECTION, SOLUTION INTRAVENOUS at 10:18

## 2018-01-01 RX ADMIN — ACYCLOVIR SODIUM 450 MG: 50 INJECTION, SOLUTION INTRAVENOUS at 20:30

## 2018-01-01 RX ADMIN — BACLOFEN 10 MG: 10 TABLET ORAL at 15:17

## 2018-01-01 RX ADMIN — Medication 10 ML: at 08:43

## 2018-01-01 RX ADMIN — AMLODIPINE BESYLATE 5 MG: 5 TABLET ORAL at 09:19

## 2018-01-01 RX ADMIN — SODIUM CHLORIDE, SODIUM LACTATE, POTASSIUM CHLORIDE, AND CALCIUM CHLORIDE: 600; 310; 30; 20 INJECTION, SOLUTION INTRAVENOUS at 06:34

## 2018-01-01 RX ADMIN — MICAFUNGIN SODIUM 50 MG: 10 INJECTION, POWDER, LYOPHILIZED, FOR SOLUTION INTRAVENOUS at 08:44

## 2018-01-01 RX ADMIN — DEXAMETHASONE 20 MG: 4 TABLET ORAL at 14:35

## 2018-01-01 RX ADMIN — URSODIOL 250 MG: 250 TABLET, FILM COATED ORAL at 07:24

## 2018-01-01 RX ADMIN — MEROPENEM 1 G: 1 INJECTION, POWDER, FOR SOLUTION INTRAVENOUS at 16:22

## 2018-01-01 RX ADMIN — MORPHINE SULFATE 4 MG: 4 INJECTION INTRAVENOUS at 14:14

## 2018-01-01 RX ADMIN — LORAZEPAM 0.5 MG: 2 INJECTION INTRAMUSCULAR; INTRAVENOUS at 16:42

## 2018-01-01 RX ADMIN — FUROSEMIDE 5 MG/HR: 10 INJECTION, SOLUTION INTRAMUSCULAR; INTRAVENOUS at 21:00

## 2018-01-01 RX ADMIN — OXYCODONE HYDROCHLORIDE 5 MG: 5 TABLET ORAL at 19:47

## 2018-01-01 RX ADMIN — SODIUM CHLORIDE 15 ML: 900 IRRIGANT IRRIGATION at 12:32

## 2018-01-01 RX ADMIN — Medication 10 ML: at 09:35

## 2018-01-01 RX ADMIN — PROPRANOLOL HYDROCHLORIDE 80 MG: 20 TABLET ORAL at 09:50

## 2018-01-01 RX ADMIN — SODIUM CHLORIDE 15 ML: 900 IRRIGANT IRRIGATION at 17:43

## 2018-01-01 RX ADMIN — POTASSIUM CHLORIDE AND SODIUM CHLORIDE: 900; 150 INJECTION, SOLUTION INTRAVENOUS at 09:15

## 2018-01-01 RX ADMIN — PROPRANOLOL HYDROCHLORIDE 80 MG: 20 TABLET ORAL at 09:59

## 2018-01-01 RX ADMIN — Medication 10 ML: at 21:43

## 2018-01-01 RX ADMIN — PROPRANOLOL HYDROCHLORIDE 80 MG: 20 TABLET ORAL at 09:53

## 2018-01-01 RX ADMIN — SODIUM CHLORIDE 15 ML: 900 IRRIGANT IRRIGATION at 21:33

## 2018-01-01 RX ADMIN — ACYCLOVIR SODIUM 350 MG: 50 INJECTION, SOLUTION INTRAVENOUS at 18:10

## 2018-01-01 RX ADMIN — URSODIOL 250 MG: 250 TABLET, FILM COATED ORAL at 12:13

## 2018-01-01 RX ADMIN — MAGNESIUM HYDROXIDE 10 ML: 400 SUSPENSION ORAL at 20:38

## 2018-01-01 RX ADMIN — MAGNESIUM SULFATE IN DEXTROSE 1 G: 10 INJECTION, SOLUTION INTRAVENOUS at 23:48

## 2018-01-01 RX ADMIN — INSULIN HUMAN 4 UNITS: 100 INJECTION, SOLUTION PARENTERAL at 18:53

## 2018-01-01 RX ADMIN — Medication 10 ML: at 09:03

## 2018-01-01 RX ADMIN — Medication 5 ML: at 00:22

## 2018-01-01 RX ADMIN — URSODIOL 250 MG: 250 TABLET, FILM COATED ORAL at 13:44

## 2018-01-01 RX ADMIN — Medication 7.5 G: at 13:30

## 2018-01-01 RX ADMIN — SODIUM CHLORIDE 250 ML: 9 INJECTION, SOLUTION INTRAVENOUS at 23:45

## 2018-01-01 RX ADMIN — MICONAZOLE NITRATE: 20 POWDER TOPICAL at 13:38

## 2018-01-01 RX ADMIN — LOPERAMIDE HYDROCHLORIDE 2 MG: 2 CAPSULE ORAL at 22:36

## 2018-01-01 RX ADMIN — VANCOMYCIN HYDROCHLORIDE 1250 MG: 10 INJECTION, POWDER, LYOPHILIZED, FOR SOLUTION INTRAVENOUS at 16:21

## 2018-01-01 RX ADMIN — POTASSIUM CHLORIDE: 2 INJECTION, SOLUTION, CONCENTRATE INTRAVENOUS at 06:41

## 2018-01-01 RX ADMIN — ACYCLOVIR SODIUM 450 MG: 50 INJECTION, SOLUTION INTRAVENOUS at 21:55

## 2018-01-01 RX ADMIN — Medication 10 ML: at 08:02

## 2018-01-01 RX ADMIN — Medication 1 MG: at 13:15

## 2018-01-01 RX ADMIN — HEPARIN SODIUM 3200 UNITS: 1000 INJECTION INTRAVENOUS; SUBCUTANEOUS at 21:05

## 2018-01-01 RX ADMIN — INSULIN HUMAN 2 UNITS: 100 INJECTION, SOLUTION PARENTERAL at 06:33

## 2018-01-01 RX ADMIN — URSODIOL 250 MG: 250 TABLET, FILM COATED ORAL at 15:46

## 2018-01-01 RX ADMIN — SODIUM CHLORIDE 15 ML: 900 IRRIGANT IRRIGATION at 21:43

## 2018-01-01 RX ADMIN — INSULIN HUMAN 2 UNITS: 100 INJECTION, SOLUTION PARENTERAL at 05:55

## 2018-01-01 RX ADMIN — MICONAZOLE NITRATE: 20 POWDER TOPICAL at 08:37

## 2018-01-01 RX ADMIN — CASTOR OIL AND BALSAM, PERU: 788; 87 OINTMENT TOPICAL at 16:43

## 2018-01-01 RX ADMIN — FLUCONAZOLE 400 MG: 200 TABLET ORAL at 09:23

## 2018-01-01 RX ADMIN — SODIUM CHLORIDE 250 ML: 9 INJECTION, SOLUTION INTRAVENOUS at 05:53

## 2018-01-01 RX ADMIN — LOPERAMIDE HYDROCHLORIDE 2 MG: 2 CAPSULE ORAL at 22:38

## 2018-01-01 RX ADMIN — SODIUM CHLORIDE 250 ML: 9 INJECTION, SOLUTION INTRAVENOUS at 06:40

## 2018-01-01 RX ADMIN — SIMETHICONE 80 MG: 80 TABLET, CHEWABLE ORAL at 21:26

## 2018-01-01 RX ADMIN — VALACYCLOVIR HYDROCHLORIDE 500 MG: 500 TABLET, FILM COATED ORAL at 19:55

## 2018-01-01 RX ADMIN — BACLOFEN 5 MG: 10 TABLET ORAL at 13:30

## 2018-01-01 RX ADMIN — AMLODIPINE BESYLATE 5 MG: 5 TABLET ORAL at 09:30

## 2018-01-01 RX ADMIN — POTASSIUM CHLORIDE: 2 INJECTION, SOLUTION, CONCENTRATE INTRAVENOUS at 22:02

## 2018-01-01 RX ADMIN — Medication 10 ML: at 12:12

## 2018-01-01 RX ADMIN — MEROPENEM 1 G: 1 INJECTION, POWDER, FOR SOLUTION INTRAVENOUS at 18:57

## 2018-01-01 RX ADMIN — CALCIUM GLUCONATE: 94 INJECTION, SOLUTION INTRAVENOUS at 16:15

## 2018-01-01 RX ADMIN — TACROLIMUS 1 MG: 1 CAPSULE ORAL at 08:47

## 2018-01-01 RX ADMIN — ZOLPIDEM TARTRATE 5 MG: 5 TABLET ORAL at 00:58

## 2018-01-01 RX ADMIN — FUROSEMIDE 40 MG: 10 INJECTION, SOLUTION INTRAMUSCULAR; INTRAVENOUS at 17:32

## 2018-01-01 RX ADMIN — LINEZOLID 600 MG: 600 INJECTION, SOLUTION INTRAVENOUS at 23:36

## 2018-01-01 RX ADMIN — LOPERAMIDE HYDROCHLORIDE 2 MG: 2 CAPSULE ORAL at 14:07

## 2018-01-01 RX ADMIN — Medication 2500 ML/HR: at 05:28

## 2018-01-01 RX ADMIN — POTASSIUM CHLORIDE: 2 INJECTION, SOLUTION, CONCENTRATE INTRAVENOUS at 10:53

## 2018-01-01 RX ADMIN — CALCIUM GLUCONATE: 94 INJECTION, SOLUTION INTRAVENOUS at 15:16

## 2018-01-01 RX ADMIN — VANCOMYCIN HYDROCHLORIDE 1250 MG: 10 INJECTION, POWDER, LYOPHILIZED, FOR SOLUTION INTRAVENOUS at 00:13

## 2018-01-01 RX ADMIN — FUROSEMIDE 40 MG: 10 INJECTION, SOLUTION INTRAMUSCULAR; INTRAVENOUS at 18:28

## 2018-01-01 RX ADMIN — FLUCONAZOLE 400 MG: 200 TABLET ORAL at 08:29

## 2018-01-01 RX ADMIN — Medication 10 ML: at 13:42

## 2018-01-01 RX ADMIN — POTASSIUM CHLORIDE: 2 INJECTION, SOLUTION, CONCENTRATE INTRAVENOUS at 11:41

## 2018-01-01 RX ADMIN — URSODIOL 250 MG: 250 TABLET, FILM COATED ORAL at 20:44

## 2018-01-01 RX ADMIN — Medication 10 ML: at 09:28

## 2018-01-01 RX ADMIN — PROPRANOLOL HYDROCHLORIDE 20 MG: 20 TABLET ORAL at 14:30

## 2018-01-01 RX ADMIN — LOPERAMIDE HYDROCHLORIDE 2 MG: 2 CAPSULE ORAL at 16:57

## 2018-01-01 RX ADMIN — PROPRANOLOL HYDROCHLORIDE 80 MG: 20 TABLET ORAL at 09:28

## 2018-01-01 RX ADMIN — ZOLPIDEM TARTRATE 5 MG: 5 TABLET ORAL at 02:25

## 2018-01-01 RX ADMIN — Medication 6 MILLICURIE: at 12:46

## 2018-01-01 RX ADMIN — TBO-FILGRASTIM 300 MCG: 300 INJECTION, SOLUTION SUBCUTANEOUS at 18:24

## 2018-01-01 RX ADMIN — MICAFUNGIN SODIUM 50 MG: 10 INJECTION, POWDER, LYOPHILIZED, FOR SOLUTION INTRAVENOUS at 11:09

## 2018-01-01 RX ADMIN — TACROLIMUS 1 MG: 1 CAPSULE ORAL at 09:13

## 2018-01-01 RX ADMIN — BACLOFEN 10 MG: 10 TABLET ORAL at 13:45

## 2018-01-01 RX ADMIN — LINEZOLID 600 MG: 600 INJECTION, SOLUTION INTRAVENOUS at 11:00

## 2018-01-01 RX ADMIN — POTASSIUM CHLORIDE: 2 INJECTION, SOLUTION, CONCENTRATE INTRAVENOUS at 20:01

## 2018-01-01 RX ADMIN — PROPRANOLOL HYDROCHLORIDE 80 MG: 20 TABLET ORAL at 10:11

## 2018-01-01 RX ADMIN — TACROLIMUS 1 MG: 1 CAPSULE ORAL at 08:44

## 2018-01-01 RX ADMIN — POTASSIUM CHLORIDE 20 MEQ: 400 INJECTION, SOLUTION INTRAVENOUS at 10:17

## 2018-01-01 RX ADMIN — PROPRANOLOL HYDROCHLORIDE 80 MG: 20 TABLET ORAL at 09:56

## 2018-01-01 RX ADMIN — CLINDAMYCIN PHOSPHATE 900 MG: 18 INJECTION, SOLUTION INTRAVENOUS at 07:39

## 2018-01-01 RX ADMIN — MICAFUNGIN SODIUM 150 MG: 10 INJECTION, POWDER, LYOPHILIZED, FOR SOLUTION INTRAVENOUS at 11:55

## 2018-01-01 RX ADMIN — PIPERACILLIN SODIUM,TAZOBACTAM SODIUM 4.5 G: 4; .5 INJECTION, POWDER, FOR SOLUTION INTRAVENOUS at 05:03

## 2018-01-01 RX ADMIN — Medication 5 ML: at 10:06

## 2018-01-01 RX ADMIN — ENOXAPARIN SODIUM 40 MG: 40 INJECTION SUBCUTANEOUS at 09:26

## 2018-01-01 RX ADMIN — SODIUM CHLORIDE: 9 INJECTION, SOLUTION INTRAVENOUS at 19:45

## 2018-01-01 RX ADMIN — INSULIN HUMAN 2 UNITS: 100 INJECTION, SOLUTION PARENTERAL at 06:30

## 2018-01-01 RX ADMIN — PANTOPRAZOLE SODIUM 40 MG: 40 INJECTION, POWDER, FOR SOLUTION INTRAVENOUS at 10:48

## 2018-01-01 RX ADMIN — VALACYCLOVIR HYDROCHLORIDE 500 MG: 500 TABLET, FILM COATED ORAL at 08:30

## 2018-01-01 RX ADMIN — ACETAMINOPHEN 650 MG: 325 TABLET, FILM COATED ORAL at 13:51

## 2018-01-01 RX ADMIN — SODIUM CHLORIDE 15 ML: 900 IRRIGANT IRRIGATION at 20:29

## 2018-01-01 RX ADMIN — BACLOFEN 10 MG: 10 TABLET ORAL at 20:23

## 2018-01-01 RX ADMIN — AMLODIPINE BESYLATE 5 MG: 5 TABLET ORAL at 09:55

## 2018-01-01 RX ADMIN — PROPRANOLOL HYDROCHLORIDE 80 MG: 20 TABLET ORAL at 08:41

## 2018-01-01 RX ADMIN — OXYCODONE HYDROCHLORIDE 2.5 MG: 5 TABLET ORAL at 03:56

## 2018-01-01 RX ADMIN — ONDANSETRON HYDROCHLORIDE 12 MG: 8 TABLET, FILM COATED ORAL at 09:43

## 2018-01-01 RX ADMIN — URSODIOL 250 MG: 250 TABLET, FILM COATED ORAL at 21:45

## 2018-01-01 RX ADMIN — URSODIOL 250 MG: 250 TABLET, FILM COATED ORAL at 16:57

## 2018-01-01 RX ADMIN — PIPERACILLIN SODIUM,TAZOBACTAM SODIUM 4.5 G: 4; .5 INJECTION, POWDER, FOR SOLUTION INTRAVENOUS at 17:43

## 2018-01-01 RX ADMIN — URSODIOL 250 MG: 250 TABLET, FILM COATED ORAL at 09:43

## 2018-01-01 RX ADMIN — BACLOFEN 5 MG: 10 TABLET ORAL at 13:58

## 2018-01-01 RX ADMIN — URSODIOL 250 MG: 250 TABLET, FILM COATED ORAL at 06:52

## 2018-01-01 RX ADMIN — BACLOFEN 5 MG: 10 TABLET ORAL at 15:58

## 2018-01-01 RX ADMIN — AMLODIPINE BESYLATE 5 MG: 5 TABLET ORAL at 08:53

## 2018-01-01 RX ADMIN — DIPHENHYDRAMINE HYDROCHLORIDE 12.5 MG: 50 INJECTION, SOLUTION INTRAMUSCULAR; INTRAVENOUS at 05:26

## 2018-01-01 RX ADMIN — PIPERACILLIN SODIUM,TAZOBACTAM SODIUM 3.38 G: 3; .375 INJECTION, POWDER, FOR SOLUTION INTRAVENOUS at 16:43

## 2018-01-01 RX ADMIN — URSODIOL 250 MG: 250 TABLET, FILM COATED ORAL at 21:42

## 2018-01-01 RX ADMIN — SODIUM CHLORIDE 15 ML: 900 IRRIGANT IRRIGATION at 11:50

## 2018-01-01 RX ADMIN — OXYCODONE HYDROCHLORIDE 5 MG: 5 TABLET ORAL at 13:45

## 2018-01-01 RX ADMIN — FLUDARABINE PHOSPHATE 55 MG: 25 INJECTION, SOLUTION INTRAVENOUS at 10:23

## 2018-01-01 RX ADMIN — BACLOFEN 5 MG: 10 TABLET ORAL at 14:23

## 2018-01-01 RX ADMIN — Medication 10 ML: at 21:32

## 2018-01-01 RX ADMIN — SODIUM CHLORIDE 15 ML: 900 IRRIGANT IRRIGATION at 16:41

## 2018-01-01 RX ADMIN — CLINDAMYCIN PHOSPHATE 900 MG: 18 INJECTION, SOLUTION INTRAVENOUS at 12:40

## 2018-01-01 RX ADMIN — INSULIN HUMAN 6 UNITS: 100 INJECTION, SOLUTION PARENTERAL at 12:10

## 2018-01-01 RX ADMIN — TBO-FILGRASTIM 300 MCG: 300 INJECTION, SOLUTION SUBCUTANEOUS at 16:29

## 2018-01-01 RX ADMIN — PANTOPRAZOLE SODIUM 40 MG: 40 TABLET, DELAYED RELEASE ORAL at 06:00

## 2018-01-01 RX ADMIN — MORPHINE SULFATE 4 MG: 4 INJECTION INTRAVENOUS at 02:03

## 2018-01-01 RX ADMIN — URSODIOL 250 MG: 250 TABLET, FILM COATED ORAL at 17:45

## 2018-01-01 RX ADMIN — PROPRANOLOL HYDROCHLORIDE 80 MG: 20 TABLET ORAL at 09:19

## 2018-01-01 RX ADMIN — MORPHINE SULFATE 4 MG: 4 INJECTION INTRAVENOUS at 08:39

## 2018-01-01 RX ADMIN — DIPHENHYDRAMINE HYDROCHLORIDE 12.5 MG: 50 INJECTION, SOLUTION INTRAMUSCULAR; INTRAVENOUS at 10:31

## 2018-01-01 RX ADMIN — URSODIOL 250 MG: 250 TABLET, FILM COATED ORAL at 06:53

## 2018-01-01 RX ADMIN — LEVALBUTEROL HYDROCHLORIDE 0.63 MG: 0.63 SOLUTION RESPIRATORY (INHALATION) at 01:26

## 2018-01-01 RX ADMIN — SODIUM CHLORIDE 15 ML: 900 IRRIGANT IRRIGATION at 08:21

## 2018-01-01 RX ADMIN — TACROLIMUS 1 MG: 1 CAPSULE ORAL at 21:14

## 2018-01-01 RX ADMIN — FUROSEMIDE 20 MG: 10 INJECTION, SOLUTION INTRAMUSCULAR; INTRAVENOUS at 08:44

## 2018-01-01 RX ADMIN — PROPRANOLOL HYDROCHLORIDE 80 MG: 20 TABLET ORAL at 08:59

## 2018-01-01 RX ADMIN — LINEZOLID 600 MG: 600 INJECTION, SOLUTION INTRAVENOUS at 15:52

## 2018-01-01 RX ADMIN — BACLOFEN 5 MG: 10 TABLET ORAL at 20:06

## 2018-01-01 RX ADMIN — Medication 10 ML: at 09:19

## 2018-01-01 RX ADMIN — Medication 1 MG: at 07:56

## 2018-01-01 RX ADMIN — INSULIN HUMAN 4 UNITS: 100 INJECTION, SOLUTION PARENTERAL at 05:45

## 2018-01-01 RX ADMIN — PERFLUTREN 2.2 MG: 6.52 INJECTION, SUSPENSION INTRAVENOUS at 14:45

## 2018-01-01 RX ADMIN — VALACYCLOVIR HYDROCHLORIDE 500 MG: 500 TABLET, FILM COATED ORAL at 20:47

## 2018-01-01 RX ADMIN — ALBUMIN (HUMAN) 12.5 G: 0.25 INJECTION, SOLUTION INTRAVENOUS at 14:16

## 2018-01-01 RX ADMIN — PIPERACILLIN SODIUM,TAZOBACTAM SODIUM 3.38 G: 3; .375 INJECTION, POWDER, FOR SOLUTION INTRAVENOUS at 08:30

## 2018-01-01 RX ADMIN — ACETAMINOPHEN 650 MG: 325 TABLET, FILM COATED ORAL at 11:36

## 2018-01-01 RX ADMIN — Medication 1 MG: at 12:12

## 2018-01-01 RX ADMIN — Medication 1 MG: at 08:43

## 2018-01-01 RX ADMIN — SODIUM CHLORIDE: 9 INJECTION, SOLUTION INTRAVENOUS at 16:32

## 2018-01-01 RX ADMIN — ONDANSETRON HYDROCHLORIDE 24 MG: 8 TABLET, FILM COATED ORAL at 14:35

## 2018-01-01 RX ADMIN — TERBINAFINE HYDROCHLORIDE: 1 CREAM TOPICAL at 09:58

## 2018-01-01 RX ADMIN — LOPERAMIDE HYDROCHLORIDE 2 MG: 2 CAPSULE ORAL at 20:07

## 2018-01-01 RX ADMIN — LOPERAMIDE HYDROCHLORIDE 2 MG: 2 CAPSULE ORAL at 22:05

## 2018-01-01 RX ADMIN — SIMETHICONE 80 MG: 80 TABLET, CHEWABLE ORAL at 09:16

## 2018-01-01 RX ADMIN — SODIUM CHLORIDE: 9 INJECTION, SOLUTION INTRAVENOUS at 09:34

## 2018-01-01 RX ADMIN — MORPHINE SULFATE 2 MG: 2 INJECTION, SOLUTION INTRAMUSCULAR; INTRAVENOUS at 03:31

## 2018-01-01 RX ADMIN — SODIUM CHLORIDE 500 ML: 9 INJECTION, SOLUTION INTRAVENOUS at 15:21

## 2018-01-01 RX ADMIN — MORPHINE SULFATE 4 MG: 4 INJECTION INTRAVENOUS at 07:56

## 2018-01-01 RX ADMIN — DEXTROSE MONOHYDRATE: 50 INJECTION, SOLUTION INTRAVENOUS at 03:53

## 2018-01-01 RX ADMIN — ACYCLOVIR SODIUM 450 MG: 50 INJECTION, SOLUTION INTRAVENOUS at 10:17

## 2018-01-01 RX ADMIN — MICAFUNGIN SODIUM 50 MG: 10 INJECTION, POWDER, LYOPHILIZED, FOR SOLUTION INTRAVENOUS at 09:08

## 2018-01-01 RX ADMIN — SODIUM CHLORIDE 15 ML: 900 IRRIGANT IRRIGATION at 18:36

## 2018-01-01 RX ADMIN — FUROSEMIDE 40 MG: 10 INJECTION, SOLUTION INTRAMUSCULAR; INTRAVENOUS at 08:47

## 2018-01-01 RX ADMIN — PHYTONADIONE 10 MG: 10 INJECTION, EMULSION INTRAMUSCULAR; INTRAVENOUS; SUBCUTANEOUS at 10:32

## 2018-01-01 RX ADMIN — MORPHINE SULFATE 2 MG: 2 INJECTION, SOLUTION INTRAMUSCULAR; INTRAVENOUS at 17:55

## 2018-01-01 RX ADMIN — Medication 10 ML: at 20:57

## 2018-01-01 RX ADMIN — URSODIOL 250 MG: 250 TABLET, FILM COATED ORAL at 20:23

## 2018-01-01 RX ADMIN — AMLODIPINE BESYLATE 5 MG: 5 TABLET ORAL at 08:08

## 2018-01-01 RX ADMIN — URSODIOL 250 MG: 250 TABLET, FILM COATED ORAL at 17:25

## 2018-01-01 RX ADMIN — LOPERAMIDE HYDROCHLORIDE 2 MG: 2 CAPSULE ORAL at 03:37

## 2018-01-01 RX ADMIN — CASTOR OIL AND BALSAM, PERU: 788; 87 OINTMENT TOPICAL at 09:44

## 2018-01-01 RX ADMIN — ACYCLOVIR SODIUM 350 MG: 50 INJECTION, SOLUTION INTRAVENOUS at 16:37

## 2018-01-01 RX ADMIN — SODIUM CHLORIDE: 9 INJECTION, SOLUTION INTRAVENOUS at 16:17

## 2018-01-01 RX ADMIN — INSULIN HUMAN 4 UNITS: 100 INJECTION, SOLUTION PARENTERAL at 11:35

## 2018-01-01 RX ADMIN — MORPHINE SULFATE 4 MG: 4 INJECTION INTRAVENOUS at 16:26

## 2018-01-01 RX ADMIN — URSODIOL 250 MG: 250 TABLET, FILM COATED ORAL at 06:41

## 2018-01-01 RX ADMIN — PIPERACILLIN SODIUM,TAZOBACTAM SODIUM 4.5 G: 4; .5 INJECTION, POWDER, FOR SOLUTION INTRAVENOUS at 23:37

## 2018-01-01 RX ADMIN — MEROPENEM 1 G: 1 INJECTION, POWDER, FOR SOLUTION INTRAVENOUS at 10:30

## 2018-01-01 RX ADMIN — PANTOPRAZOLE SODIUM 40 MG: 40 INJECTION, POWDER, FOR SOLUTION INTRAVENOUS at 08:13

## 2018-01-01 RX ADMIN — POTASSIUM CHLORIDE 20 MEQ: 400 INJECTION, SOLUTION INTRAVENOUS at 05:51

## 2018-01-01 RX ADMIN — SODIUM CHLORIDE 15 ML: 900 IRRIGANT IRRIGATION at 05:42

## 2018-01-01 RX ADMIN — BACLOFEN 10 MG: 10 TABLET ORAL at 08:58

## 2018-01-01 RX ADMIN — CASTOR OIL AND BALSAM, PERU: 788; 87 OINTMENT TOPICAL at 21:45

## 2018-01-01 RX ADMIN — LEVALBUTEROL HYDROCHLORIDE 0.63 MG: 0.63 SOLUTION RESPIRATORY (INHALATION) at 21:04

## 2018-01-01 RX ADMIN — TACROLIMUS 1 MG: 1 CAPSULE ORAL at 07:57

## 2018-01-01 RX ADMIN — PANTOPRAZOLE SODIUM 40 MG: 40 TABLET, DELAYED RELEASE ORAL at 11:02

## 2018-01-01 RX ADMIN — BACLOFEN 10 MG: 10 TABLET ORAL at 21:08

## 2018-01-01 RX ADMIN — POTASSIUM CHLORIDE 20 MEQ: 400 INJECTION, SOLUTION INTRAVENOUS at 05:45

## 2018-01-01 RX ADMIN — POTASSIUM CHLORIDE 20 MEQ: 400 INJECTION, SOLUTION INTRAVENOUS at 07:54

## 2018-01-01 RX ADMIN — TACROLIMUS 2.5 MG: 0.5 CAPSULE ORAL at 08:57

## 2018-01-01 RX ADMIN — ACETAMINOPHEN 650 MG: 325 TABLET, FILM COATED ORAL at 03:23

## 2018-01-01 RX ADMIN — PROPOFOL 20 MCG/KG/MIN: 10 INJECTION, EMULSION INTRAVENOUS at 04:47

## 2018-01-01 RX ADMIN — PROPRANOLOL HYDROCHLORIDE 20 MG: 20 TABLET ORAL at 09:44

## 2018-01-01 RX ADMIN — PROPRANOLOL HYDROCHLORIDE 20 MG: 20 TABLET ORAL at 13:17

## 2018-01-01 RX ADMIN — METHOTREXATE 20 MG: 25 INJECTION, SOLUTION INTRA-ARTERIAL; INTRAMUSCULAR; INTRAVENOUS at 12:25

## 2018-01-01 RX ADMIN — ACETAMINOPHEN 650 MG: 325 TABLET, FILM COATED ORAL at 07:57

## 2018-01-01 RX ADMIN — VALACYCLOVIR HYDROCHLORIDE 500 MG: 500 TABLET, FILM COATED ORAL at 08:38

## 2018-01-01 RX ADMIN — BACLOFEN 10 MG: 10 TABLET ORAL at 09:18

## 2018-01-01 RX ADMIN — SODIUM CHLORIDE 15 ML: 900 IRRIGANT IRRIGATION at 21:08

## 2018-01-01 RX ADMIN — SODIUM CHLORIDE 250 ML: 9 INJECTION, SOLUTION INTRAVENOUS at 08:13

## 2018-01-01 RX ADMIN — TACROLIMUS 1.5 MG: 0.5 CAPSULE ORAL at 19:56

## 2018-01-01 RX ADMIN — ACYCLOVIR SODIUM 450 MG: 50 INJECTION, SOLUTION INTRAVENOUS at 09:54

## 2018-01-01 RX ADMIN — MEROPENEM 1 G: 1 INJECTION, POWDER, FOR SOLUTION INTRAVENOUS at 08:37

## 2018-01-01 RX ADMIN — VALACYCLOVIR HYDROCHLORIDE 500 MG: 500 TABLET, FILM COATED ORAL at 21:24

## 2018-01-01 RX ADMIN — Medication 2500 ML/HR: at 03:30

## 2018-01-01 RX ADMIN — MICONAZOLE NITRATE: 20 POWDER TOPICAL at 08:12

## 2018-01-01 RX ADMIN — MICONAZOLE NITRATE: 20 POWDER TOPICAL at 09:57

## 2018-01-01 RX ADMIN — OXYCODONE HYDROCHLORIDE 5 MG: 5 TABLET ORAL at 13:46

## 2018-01-01 RX ADMIN — INSULIN HUMAN 4 UNITS: 100 INJECTION, SOLUTION PARENTERAL at 00:15

## 2018-01-01 RX ADMIN — INSULIN HUMAN 2 UNITS: 100 INJECTION, SOLUTION PARENTERAL at 21:41

## 2018-01-01 RX ADMIN — INSULIN HUMAN 2 UNITS: 100 INJECTION, SOLUTION PARENTERAL at 23:47

## 2018-01-01 RX ADMIN — URSODIOL 250 MG: 250 TABLET, FILM COATED ORAL at 20:07

## 2018-01-01 RX ADMIN — PANTOPRAZOLE SODIUM 40 MG: 40 GRANULE, DELAYED RELEASE ORAL at 08:29

## 2018-01-01 RX ADMIN — PANTOPRAZOLE SODIUM 40 MG: 40 TABLET, DELAYED RELEASE ORAL at 05:45

## 2018-01-01 RX ADMIN — BACLOFEN 5 MG: 10 TABLET ORAL at 14:47

## 2018-01-01 RX ADMIN — URSODIOL 250 MG: 250 TABLET, FILM COATED ORAL at 12:31

## 2018-01-01 RX ADMIN — CASTOR OIL AND BALSAM, PERU: 788; 87 OINTMENT TOPICAL at 11:17

## 2018-01-01 RX ADMIN — Medication 10 ML: at 09:37

## 2018-01-01 RX ADMIN — Medication 2500 ML/HR: at 20:38

## 2018-01-01 RX ADMIN — METHOTREXATE 30 MG: 25 INJECTION, SOLUTION INTRA-ARTERIAL; INTRAMUSCULAR; INTRAVENOUS at 21:42

## 2018-01-01 RX ADMIN — URSODIOL 250 MG: 250 TABLET, FILM COATED ORAL at 11:35

## 2018-01-01 RX ADMIN — MORPHINE SULFATE 4 MG: 4 INJECTION INTRAVENOUS at 09:04

## 2018-01-01 RX ADMIN — DIPHENHYDRAMINE HYDROCHLORIDE 12.5 MG: 50 INJECTION, SOLUTION INTRAMUSCULAR; INTRAVENOUS at 23:45

## 2018-01-01 RX ADMIN — DIPHENHYDRAMINE HYDROCHLORIDE 12.5 MG: 50 INJECTION, SOLUTION INTRAMUSCULAR; INTRAVENOUS at 03:48

## 2018-01-01 RX ADMIN — LOPERAMIDE HYDROCHLORIDE 2 MG: 2 CAPSULE ORAL at 05:46

## 2018-01-01 RX ADMIN — URSODIOL 250 MG: 250 TABLET, FILM COATED ORAL at 17:46

## 2018-01-01 RX ADMIN — URSODIOL 250 MG: 250 TABLET, FILM COATED ORAL at 11:07

## 2018-01-01 RX ADMIN — BACLOFEN 5 MG: 10 TABLET ORAL at 08:52

## 2018-01-01 RX ADMIN — Medication 2500 ML/HR: at 12:00

## 2018-01-01 RX ADMIN — URSODIOL 250 MG: 250 TABLET, FILM COATED ORAL at 09:13

## 2018-01-01 RX ADMIN — FUROSEMIDE 40 MG: 10 INJECTION, SOLUTION INTRAMUSCULAR; INTRAVENOUS at 18:27

## 2018-01-01 RX ADMIN — DIPHENHYDRAMINE HYDROCHLORIDE 12.5 MG: 50 INJECTION, SOLUTION INTRAMUSCULAR; INTRAVENOUS at 06:46

## 2018-01-01 RX ADMIN — SODIUM CHLORIDE 500 ML: 9 INJECTION, SOLUTION INTRAVENOUS at 12:00

## 2018-01-01 RX ADMIN — SODIUM CHLORIDE: 9 INJECTION, SOLUTION INTRAVENOUS at 06:27

## 2018-01-01 RX ADMIN — IOHEXOL 50 ML: 240 INJECTION, SOLUTION INTRATHECAL; INTRAVASCULAR; INTRAVENOUS; ORAL at 18:01

## 2018-01-01 RX ADMIN — LOPERAMIDE HYDROCHLORIDE 4 MG: 2 CAPSULE ORAL at 20:18

## 2018-01-01 RX ADMIN — VALACYCLOVIR HYDROCHLORIDE 500 MG: 500 TABLET, FILM COATED ORAL at 08:10

## 2018-01-01 RX ADMIN — Medication 10 ML: at 08:33

## 2018-01-01 RX ADMIN — SODIUM CHLORIDE 250 ML: 9 INJECTION, SOLUTION INTRAVENOUS at 04:45

## 2018-01-01 RX ADMIN — SODIUM CHLORIDE 15 ML: 900 IRRIGANT IRRIGATION at 13:46

## 2018-01-01 RX ADMIN — INSULIN HUMAN 4 UNITS: 100 INJECTION, SOLUTION PARENTERAL at 03:25

## 2018-01-01 RX ADMIN — SODIUM CHLORIDE 15 ML: 900 IRRIGANT IRRIGATION at 18:09

## 2018-01-01 RX ADMIN — MEROPENEM 500 MG: 500 INJECTION, POWDER, FOR SOLUTION INTRAVENOUS at 15:46

## 2018-01-01 RX ADMIN — MICAFUNGIN SODIUM 50 MG: 10 INJECTION, POWDER, LYOPHILIZED, FOR SOLUTION INTRAVENOUS at 10:21

## 2018-01-01 RX ADMIN — OXYCODONE HYDROCHLORIDE 5 MG: 5 TABLET ORAL at 13:50

## 2018-01-01 RX ADMIN — LOPERAMIDE HYDROCHLORIDE 2 MG: 2 CAPSULE ORAL at 20:02

## 2018-01-01 RX ADMIN — URSODIOL 250 MG: 250 TABLET, FILM COATED ORAL at 17:33

## 2018-01-01 RX ADMIN — LOPERAMIDE HYDROCHLORIDE 2 MG: 2 CAPSULE ORAL at 11:57

## 2018-01-01 RX ADMIN — Medication 10 ML: at 13:15

## 2018-01-01 RX ADMIN — Medication 10 ML: at 19:45

## 2018-01-01 RX ADMIN — POTASSIUM CHLORIDE AND SODIUM CHLORIDE: 900; 150 INJECTION, SOLUTION INTRAVENOUS at 08:06

## 2018-01-01 RX ADMIN — DEXTROSE MONOHYDRATE: 50 INJECTION, SOLUTION INTRAVENOUS at 18:28

## 2018-01-01 RX ADMIN — Medication 10 ML: at 21:29

## 2018-01-01 RX ADMIN — TACROLIMUS 1.5 MG: 0.5 CAPSULE ORAL at 20:02

## 2018-01-01 RX ADMIN — SODIUM CHLORIDE 15 ML: 900 IRRIGANT IRRIGATION at 18:01

## 2018-01-01 RX ADMIN — SODIUM CHLORIDE 15 ML: 900 IRRIGANT IRRIGATION at 12:09

## 2018-01-01 RX ADMIN — POTASSIUM CHLORIDE: 2 INJECTION, SOLUTION, CONCENTRATE INTRAVENOUS at 06:43

## 2018-01-01 RX ADMIN — URSODIOL 250 MG: 250 TABLET, FILM COATED ORAL at 20:22

## 2018-01-01 RX ADMIN — LINEZOLID 600 MG: 600 INJECTION, SOLUTION INTRAVENOUS at 20:45

## 2018-01-01 RX ADMIN — ZOLPIDEM TARTRATE 5 MG: 5 TABLET ORAL at 23:45

## 2018-01-01 RX ADMIN — CASTOR OIL AND BALSAM, PERU: 788; 87 OINTMENT TOPICAL at 21:24

## 2018-01-01 RX ADMIN — LEVOFLOXACIN 500 MG: 500 TABLET, FILM COATED ORAL at 20:08

## 2018-01-01 RX ADMIN — URSODIOL 250 MG: 250 TABLET, FILM COATED ORAL at 05:57

## 2018-01-01 RX ADMIN — Medication 10 ML: at 21:31

## 2018-01-01 RX ADMIN — MEROPENEM 1 G: 1 INJECTION, POWDER, FOR SOLUTION INTRAVENOUS at 16:16

## 2018-01-01 RX ADMIN — ACYCLOVIR SODIUM 350 MG: 50 INJECTION, SOLUTION INTRAVENOUS at 17:51

## 2018-01-01 RX ADMIN — PANTOPRAZOLE SODIUM 40 MG: 40 INJECTION, POWDER, FOR SOLUTION INTRAVENOUS at 07:56

## 2018-01-01 RX ADMIN — PANTOPRAZOLE SODIUM 40 MG: 40 TABLET, DELAYED RELEASE ORAL at 06:41

## 2018-01-01 RX ADMIN — Medication 10 ML: at 20:55

## 2018-01-01 RX ADMIN — MORPHINE SULFATE 4 MG: 4 INJECTION INTRAVENOUS at 11:58

## 2018-01-01 RX ADMIN — INSULIN HUMAN 2 UNITS: 100 INJECTION, SOLUTION PARENTERAL at 13:53

## 2018-01-01 RX ADMIN — TBO-FILGRASTIM 300 MCG: 300 INJECTION, SOLUTION SUBCUTANEOUS at 18:08

## 2018-01-01 RX ADMIN — INSULIN HUMAN 4 UNITS: 100 INJECTION, SOLUTION PARENTERAL at 00:54

## 2018-01-01 RX ADMIN — SODIUM CHLORIDE 250 ML: 9 INJECTION, SOLUTION INTRAVENOUS at 05:17

## 2018-01-01 RX ADMIN — MICONAZOLE NITRATE: 20 POWDER TOPICAL at 13:10

## 2018-01-01 RX ADMIN — BACLOFEN 5 MG: 10 TABLET ORAL at 08:39

## 2018-01-01 RX ADMIN — Medication 10 ML: at 08:53

## 2018-01-01 RX ADMIN — TBO-FILGRASTIM 300 MCG: 300 INJECTION, SOLUTION SUBCUTANEOUS at 17:04

## 2018-01-01 RX ADMIN — DIPHENHYDRAMINE HYDROCHLORIDE 12.5 MG: 50 INJECTION, SOLUTION INTRAMUSCULAR; INTRAVENOUS at 06:40

## 2018-01-01 RX ADMIN — BACLOFEN 5 MG: 10 TABLET ORAL at 08:36

## 2018-01-01 RX ADMIN — ACYCLOVIR SODIUM 350 MG: 50 INJECTION, SOLUTION INTRAVENOUS at 22:32

## 2018-01-01 RX ADMIN — MORPHINE SULFATE 4 MG: 4 INJECTION INTRAVENOUS at 16:32

## 2018-01-01 RX ADMIN — MICAFUNGIN SODIUM 150 MG: 10 INJECTION, POWDER, LYOPHILIZED, FOR SOLUTION INTRAVENOUS at 09:03

## 2018-01-01 RX ADMIN — POTASSIUM CHLORIDE AND SODIUM CHLORIDE: 900; 150 INJECTION, SOLUTION INTRAVENOUS at 17:56

## 2018-01-01 RX ADMIN — INSULIN HUMAN 6 UNITS: 100 INJECTION, SOLUTION PARENTERAL at 06:30

## 2018-01-01 RX ADMIN — MICONAZOLE NITRATE: 20 POWDER TOPICAL at 09:58

## 2018-01-01 RX ADMIN — FLUCONAZOLE 400 MG: 200 TABLET ORAL at 09:26

## 2018-01-01 RX ADMIN — TACROLIMUS 1.5 MG: 0.5 CAPSULE ORAL at 09:23

## 2018-01-01 RX ADMIN — CASTOR OIL AND BALSAM, PERU: 788; 87 OINTMENT TOPICAL at 20:44

## 2018-01-01 RX ADMIN — VANCOMYCIN HYDROCHLORIDE 1250 MG: 10 INJECTION, POWDER, LYOPHILIZED, FOR SOLUTION INTRAVENOUS at 16:39

## 2018-01-01 RX ADMIN — MICAFUNGIN SODIUM 150 MG: 10 INJECTION, POWDER, LYOPHILIZED, FOR SOLUTION INTRAVENOUS at 09:36

## 2018-01-01 RX ADMIN — TACROLIMUS 2.5 MG: 0.5 CAPSULE ORAL at 20:08

## 2018-01-01 RX ADMIN — PROPOFOL 20 MG: 10 INJECTION, EMULSION INTRAVENOUS at 05:00

## 2018-01-01 RX ADMIN — Medication 10 ML: at 13:17

## 2018-01-01 RX ADMIN — MEROPENEM 1 G: 1 INJECTION, POWDER, FOR SOLUTION INTRAVENOUS at 17:55

## 2018-01-01 RX ADMIN — BACLOFEN 5 MG: 10 TABLET ORAL at 14:27

## 2018-01-01 RX ADMIN — Medication 1 MG: at 16:16

## 2018-01-01 RX ADMIN — Medication 1 MG: at 09:34

## 2018-01-01 RX ADMIN — DIPHENHYDRAMINE HYDROCHLORIDE 12.5 MG: 50 INJECTION, SOLUTION INTRAMUSCULAR; INTRAVENOUS at 06:22

## 2018-01-01 RX ADMIN — VALACYCLOVIR HYDROCHLORIDE 500 MG: 500 TABLET, FILM COATED ORAL at 08:09

## 2018-01-01 RX ADMIN — SULFAMETHOXAZOLE AND TRIMETHOPRIM 1 TABLET: 800; 160 TABLET ORAL at 08:53

## 2018-01-01 RX ADMIN — VALACYCLOVIR HYDROCHLORIDE 500 MG: 500 TABLET, FILM COATED ORAL at 21:07

## 2018-01-01 RX ADMIN — URSODIOL 250 MG: 250 TABLET, FILM COATED ORAL at 12:30

## 2018-01-01 RX ADMIN — Medication 1 MG: at 09:06

## 2018-01-01 RX ADMIN — DIPHENHYDRAMINE HCL 25 MG: 25 TABLET ORAL at 05:11

## 2018-01-01 RX ADMIN — CASTOR OIL AND BALSAM, PERU: 788; 87 OINTMENT TOPICAL at 20:15

## 2018-01-01 RX ADMIN — Medication 10 ML: at 20:29

## 2018-01-01 RX ADMIN — MICAFUNGIN SODIUM 50 MG: 10 INJECTION, POWDER, LYOPHILIZED, FOR SOLUTION INTRAVENOUS at 10:33

## 2018-01-01 RX ADMIN — TACROLIMUS 0.5 MG: 0.5 CAPSULE ORAL at 14:07

## 2018-01-01 RX ADMIN — PANTOPRAZOLE SODIUM 40 MG: 40 TABLET, DELAYED RELEASE ORAL at 06:16

## 2018-01-01 RX ADMIN — PANTOPRAZOLE SODIUM 40 MG: 40 TABLET, DELAYED RELEASE ORAL at 06:24

## 2018-01-01 RX ADMIN — PANTOPRAZOLE SODIUM 40 MG: 40 INJECTION, POWDER, FOR SOLUTION INTRAVENOUS at 08:46

## 2018-01-01 RX ADMIN — BACLOFEN 10 MG: 10 TABLET ORAL at 13:52

## 2018-01-01 RX ADMIN — SODIUM CHLORIDE 15 ML: 900 IRRIGANT IRRIGATION at 12:22

## 2018-01-01 RX ADMIN — CASTOR OIL AND BALSAM, PERU: 788; 87 OINTMENT TOPICAL at 22:33

## 2018-01-01 RX ADMIN — SODIUM CHLORIDE 250 ML: 9 INJECTION, SOLUTION INTRAVENOUS at 07:24

## 2018-01-01 RX ADMIN — BACLOFEN 10 MG: 10 TABLET ORAL at 09:26

## 2018-01-01 RX ADMIN — MEROPENEM 1 G: 1 INJECTION, POWDER, FOR SOLUTION INTRAVENOUS at 02:30

## 2018-01-01 RX ADMIN — Medication 1 MG: at 16:29

## 2018-01-01 RX ADMIN — PHENYLEPHRINE HYDROCHLORIDE 100 MCG/MIN: 10 INJECTION INTRAVENOUS at 00:35

## 2018-01-01 RX ADMIN — INSULIN HUMAN 4 UNITS: 100 INJECTION, SOLUTION PARENTERAL at 00:23

## 2018-01-01 RX ADMIN — MORPHINE SULFATE 4 MG: 4 INJECTION INTRAVENOUS at 03:53

## 2018-01-01 RX ADMIN — POTASSIUM CHLORIDE: 2 INJECTION, SOLUTION, CONCENTRATE INTRAVENOUS at 23:20

## 2018-01-01 RX ADMIN — FUROSEMIDE 40 MG: 10 INJECTION, SOLUTION INTRAMUSCULAR; INTRAVENOUS at 17:46

## 2018-01-01 RX ADMIN — METHOTREXATE 20 MG: 25 INJECTION, SOLUTION INTRA-ARTERIAL; INTRAMUSCULAR; INTRAVENOUS at 12:08

## 2018-01-01 RX ADMIN — TACROLIMUS 0.5 MG: 0.5 CAPSULE ORAL at 09:55

## 2018-01-01 RX ADMIN — URSODIOL 250 MG: 250 TABLET, FILM COATED ORAL at 10:12

## 2018-01-01 RX ADMIN — URSODIOL 250 MG: 250 TABLET, FILM COATED ORAL at 06:16

## 2018-01-01 RX ADMIN — BACLOFEN 5 MG: 10 TABLET ORAL at 09:06

## 2018-01-01 RX ADMIN — MEROPENEM 1 G: 1 INJECTION, POWDER, FOR SOLUTION INTRAVENOUS at 01:53

## 2018-01-01 RX ADMIN — MORPHINE SULFATE 4 MG: 4 INJECTION INTRAVENOUS at 11:01

## 2018-01-01 RX ADMIN — LINEZOLID 600 MG: 600 INJECTION, SOLUTION INTRAVENOUS at 09:32

## 2018-01-01 RX ADMIN — HEPARIN SODIUM 3200 UNITS: 1000 INJECTION INTRAVENOUS; SUBCUTANEOUS at 10:45

## 2018-01-01 RX ADMIN — ZOLPIDEM TARTRATE 5 MG: 5 TABLET ORAL at 23:40

## 2018-01-01 RX ADMIN — URSODIOL 250 MG: 250 TABLET, FILM COATED ORAL at 21:40

## 2018-01-01 RX ADMIN — PIPERACILLIN SODIUM,TAZOBACTAM SODIUM 3.38 G: 3; .375 INJECTION, POWDER, FOR SOLUTION INTRAVENOUS at 03:48

## 2018-01-01 RX ADMIN — LOPERAMIDE HYDROCHLORIDE 2 MG: 2 CAPSULE ORAL at 05:03

## 2018-01-01 RX ADMIN — BACLOFEN 5 MG: 10 TABLET ORAL at 09:37

## 2018-01-01 RX ADMIN — VALACYCLOVIR HYDROCHLORIDE 500 MG: 500 TABLET, FILM COATED ORAL at 21:32

## 2018-01-01 RX ADMIN — LINEZOLID 600 MG: 600 INJECTION, SOLUTION INTRAVENOUS at 21:19

## 2018-01-01 RX ADMIN — PIPERACILLIN SODIUM,TAZOBACTAM SODIUM 4.5 G: 4; .5 INJECTION, POWDER, FOR SOLUTION INTRAVENOUS at 17:31

## 2018-01-01 RX ADMIN — Medication 1 MG: at 09:27

## 2018-01-01 RX ADMIN — MICAFUNGIN SODIUM 150 MG: 10 INJECTION, POWDER, LYOPHILIZED, FOR SOLUTION INTRAVENOUS at 09:23

## 2018-01-01 RX ADMIN — Medication 10 ML: at 21:06

## 2018-01-01 RX ADMIN — FLUDARABINE PHOSPHATE 55 MG: 25 INJECTION, SOLUTION INTRAVENOUS at 10:11

## 2018-01-01 RX ADMIN — MORPHINE SULFATE 4 MG: 4 INJECTION INTRAVENOUS at 02:11

## 2018-01-01 RX ADMIN — Medication 10 ML: at 09:45

## 2018-01-01 RX ADMIN — LINEZOLID 600 MG: 600 INJECTION, SOLUTION INTRAVENOUS at 09:59

## 2018-01-01 RX ADMIN — VANCOMYCIN HYDROCHLORIDE 1250 MG: 10 INJECTION, POWDER, LYOPHILIZED, FOR SOLUTION INTRAVENOUS at 03:56

## 2018-01-01 RX ADMIN — ACYCLOVIR SODIUM 450 MG: 50 INJECTION, SOLUTION INTRAVENOUS at 08:15

## 2018-01-01 RX ADMIN — OXYCODONE HYDROCHLORIDE 5 MG: 5 TABLET ORAL at 22:59

## 2018-01-01 RX ADMIN — Medication 2500 ML/HR: at 14:07

## 2018-01-01 RX ADMIN — BACLOFEN 10 MG: 10 TABLET ORAL at 08:53

## 2018-01-01 RX ADMIN — SODIUM CHLORIDE 15 ML: 900 IRRIGANT IRRIGATION at 21:26

## 2018-01-01 RX ADMIN — CASTOR OIL AND BALSAM, PERU: 788; 87 OINTMENT TOPICAL at 13:38

## 2018-01-01 RX ADMIN — SODIUM CHLORIDE 15 ML: 900 IRRIGANT IRRIGATION at 12:06

## 2018-01-01 RX ADMIN — URSODIOL 250 MG: 250 TABLET, FILM COATED ORAL at 21:35

## 2018-01-01 RX ADMIN — ENOXAPARIN SODIUM 40 MG: 40 INJECTION SUBCUTANEOUS at 09:55

## 2018-01-01 RX ADMIN — ACYCLOVIR SODIUM 450 MG: 50 INJECTION, SOLUTION INTRAVENOUS at 09:40

## 2018-01-01 RX ADMIN — AMLODIPINE BESYLATE 5 MG: 5 TABLET ORAL at 08:47

## 2018-01-01 RX ADMIN — Medication 0.5 MG: at 09:37

## 2018-01-01 RX ADMIN — SODIUM CHLORIDE 250 ML: 9 INJECTION, SOLUTION INTRAVENOUS at 16:48

## 2018-01-01 RX ADMIN — TACROLIMUS 1.5 MG: 0.5 CAPSULE ORAL at 21:29

## 2018-01-01 RX ADMIN — POTASSIUM CHLORIDE 20 MEQ: 400 INJECTION, SOLUTION INTRAVENOUS at 07:46

## 2018-01-01 RX ADMIN — FLUCONAZOLE 400 MG: 200 TABLET ORAL at 08:52

## 2018-01-01 RX ADMIN — URSODIOL 250 MG: 250 TABLET, FILM COATED ORAL at 19:55

## 2018-01-01 RX ADMIN — PIPERACILLIN SODIUM,TAZOBACTAM SODIUM 4.5 G: 4; .5 INJECTION, POWDER, FOR SOLUTION INTRAVENOUS at 12:25

## 2018-01-01 RX ADMIN — VANCOMYCIN HYDROCHLORIDE 1250 MG: 10 INJECTION, POWDER, LYOPHILIZED, FOR SOLUTION INTRAVENOUS at 04:17

## 2018-01-01 RX ADMIN — PROPRANOLOL HYDROCHLORIDE 80 MG: 20 TABLET ORAL at 08:09

## 2018-01-01 RX ADMIN — MORPHINE SULFATE 4 MG: 4 INJECTION INTRAVENOUS at 03:35

## 2018-01-01 RX ADMIN — POTASSIUM CHLORIDE AND SODIUM CHLORIDE: 900; 150 INJECTION, SOLUTION INTRAVENOUS at 17:14

## 2018-01-01 RX ADMIN — SODIUM CHLORIDE: 9 INJECTION, SOLUTION INTRAVENOUS at 05:52

## 2018-01-01 RX ADMIN — DIPHENHYDRAMINE HYDROCHLORIDE 12.5 MG: 50 INJECTION, SOLUTION INTRAMUSCULAR; INTRAVENOUS at 05:38

## 2018-01-01 RX ADMIN — URSODIOL 250 MG: 250 TABLET, FILM COATED ORAL at 21:30

## 2018-01-01 RX ADMIN — SODIUM CHLORIDE 250 ML: 9 INJECTION, SOLUTION INTRAVENOUS at 11:00

## 2018-01-01 RX ADMIN — ENOXAPARIN SODIUM 40 MG: 40 INJECTION SUBCUTANEOUS at 08:58

## 2018-01-01 RX ADMIN — Medication 1 MG: at 20:54

## 2018-01-01 RX ADMIN — MICONAZOLE NITRATE: 20 POWDER TOPICAL at 12:14

## 2018-01-01 RX ADMIN — BACLOFEN 5 MG: 10 TABLET ORAL at 21:17

## 2018-01-01 RX ADMIN — Medication 10 ML: at 08:39

## 2018-01-01 RX ADMIN — SODIUM CHLORIDE 15 ML: 900 IRRIGANT IRRIGATION at 18:08

## 2018-01-01 RX ADMIN — Medication 10 ML: at 09:54

## 2018-01-01 RX ADMIN — ONDANSETRON 8 MG: 2 INJECTION INTRAMUSCULAR; INTRAVENOUS at 00:26

## 2018-01-01 RX ADMIN — SODIUM CHLORIDE 15 ML: 900 IRRIGANT IRRIGATION at 11:22

## 2018-01-01 RX ADMIN — SODIUM CHLORIDE 250 ML: 9 INJECTION, SOLUTION INTRAVENOUS at 07:54

## 2018-01-01 RX ADMIN — OXYCODONE HYDROCHLORIDE 5 MG: 5 TABLET ORAL at 13:59

## 2018-01-01 RX ADMIN — VALACYCLOVIR HYDROCHLORIDE 500 MG: 500 TABLET, FILM COATED ORAL at 20:02

## 2018-01-01 RX ADMIN — SODIUM CHLORIDE 250 ML: 9 INJECTION, SOLUTION INTRAVENOUS at 06:06

## 2018-01-01 RX ADMIN — ACYCLOVIR SODIUM 350 MG: 50 INJECTION, SOLUTION INTRAVENOUS at 17:54

## 2018-01-01 RX ADMIN — ZOLPIDEM TARTRATE 5 MG: 5 TABLET ORAL at 23:50

## 2018-01-01 RX ADMIN — SODIUM CHLORIDE 15 ML: 900 IRRIGANT IRRIGATION at 16:59

## 2018-01-01 RX ADMIN — Medication 1 MG: at 21:43

## 2018-01-01 RX ADMIN — Medication 0.5 MG: at 21:47

## 2018-01-01 RX ADMIN — SODIUM CHLORIDE 15 ML: 900 IRRIGANT IRRIGATION at 06:19

## 2018-01-01 RX ADMIN — CASTOR OIL AND BALSAM, PERU: 788; 87 OINTMENT TOPICAL at 09:53

## 2018-01-01 RX ADMIN — BACLOFEN 10 MG: 10 TABLET ORAL at 16:16

## 2018-01-01 RX ADMIN — POTASSIUM CHLORIDE: 2 INJECTION, SOLUTION, CONCENTRATE INTRAVENOUS at 18:00

## 2018-01-01 RX ADMIN — VALACYCLOVIR HYDROCHLORIDE 500 MG: 500 TABLET, FILM COATED ORAL at 08:52

## 2018-01-01 RX ADMIN — INSULIN HUMAN 8 UNITS: 100 INJECTION, SOLUTION PARENTERAL at 12:36

## 2018-01-01 RX ADMIN — DEXTROSE AND SODIUM CHLORIDE 1000 ML: 5; 450 INJECTION, SOLUTION INTRAVENOUS at 11:45

## 2018-01-01 RX ADMIN — MICONAZOLE NITRATE: 20 POWDER TOPICAL at 21:45

## 2018-01-01 RX ADMIN — SODIUM CHLORIDE 15 ML: 900 IRRIGANT IRRIGATION at 09:11

## 2018-01-01 RX ADMIN — CASTOR OIL AND BALSAM, PERU: 788; 87 OINTMENT TOPICAL at 03:25

## 2018-01-01 RX ADMIN — ONDANSETRON HYDROCHLORIDE 12 MG: 8 TABLET, FILM COATED ORAL at 08:53

## 2018-01-01 RX ADMIN — MICAFUNGIN SODIUM 150 MG: 10 INJECTION, POWDER, LYOPHILIZED, FOR SOLUTION INTRAVENOUS at 09:52

## 2018-01-01 RX ADMIN — MEROPENEM 1 G: 1 INJECTION, POWDER, FOR SOLUTION INTRAVENOUS at 23:49

## 2018-01-01 RX ADMIN — SODIUM CHLORIDE 15 ML: 900 IRRIGANT IRRIGATION at 09:55

## 2018-01-01 RX ADMIN — INSULIN HUMAN 4 UNITS: 100 INJECTION, SOLUTION PARENTERAL at 06:59

## 2018-01-01 RX ADMIN — Medication 10 ML: at 09:12

## 2018-01-01 RX ADMIN — HALOPERIDOL LACTATE 2 MG: 5 INJECTION, SOLUTION INTRAMUSCULAR at 14:21

## 2018-01-01 RX ADMIN — ZOLPIDEM TARTRATE 5 MG: 5 TABLET ORAL at 23:02

## 2018-01-01 RX ADMIN — BACLOFEN 5 MG: 10 TABLET ORAL at 14:19

## 2018-01-01 RX ADMIN — TBO-FILGRASTIM 300 MCG: 300 INJECTION, SOLUTION SUBCUTANEOUS at 17:37

## 2018-01-01 RX ADMIN — INSULIN HUMAN 4 UNITS: 100 INJECTION, SOLUTION PARENTERAL at 18:03

## 2018-01-01 RX ADMIN — POTASSIUM CHLORIDE 20 MEQ: 400 INJECTION, SOLUTION INTRAVENOUS at 00:08

## 2018-01-01 RX ADMIN — INSULIN HUMAN 4 UNITS: 100 INJECTION, SOLUTION PARENTERAL at 09:00

## 2018-01-01 RX ADMIN — MICONAZOLE NITRATE: 20 POWDER TOPICAL at 08:02

## 2018-01-01 RX ADMIN — LINEZOLID 600 MG: 600 INJECTION, SOLUTION INTRAVENOUS at 09:33

## 2018-01-01 RX ADMIN — SODIUM CHLORIDE 15 ML: 900 IRRIGANT IRRIGATION at 08:47

## 2018-01-01 RX ADMIN — MEROPENEM 1 G: 1 INJECTION, POWDER, FOR SOLUTION INTRAVENOUS at 15:30

## 2018-01-01 RX ADMIN — INSULIN HUMAN 4 UNITS: 100 INJECTION, SOLUTION PARENTERAL at 18:13

## 2018-01-01 RX ADMIN — INSULIN HUMAN 6 UNITS: 100 INJECTION, SOLUTION PARENTERAL at 05:45

## 2018-01-01 RX ADMIN — LEVALBUTEROL HYDROCHLORIDE 0.63 MG: 0.63 SOLUTION RESPIRATORY (INHALATION) at 04:10

## 2018-01-01 RX ADMIN — SODIUM CHLORIDE 15 ML: 900 IRRIGANT IRRIGATION at 07:33

## 2018-01-01 RX ADMIN — URSODIOL 250 MG: 250 TABLET, FILM COATED ORAL at 16:00

## 2018-01-01 RX ADMIN — LEVALBUTEROL HYDROCHLORIDE 0.63 MG: 0.63 SOLUTION RESPIRATORY (INHALATION) at 12:40

## 2018-01-01 RX ADMIN — BACLOFEN 10 MG: 10 TABLET ORAL at 15:02

## 2018-01-01 RX ADMIN — Medication 5 ML: at 09:00

## 2018-01-01 RX ADMIN — MORPHINE SULFATE 2 MG: 2 INJECTION, SOLUTION INTRAMUSCULAR; INTRAVENOUS at 12:54

## 2018-01-01 RX ADMIN — BACLOFEN 10 MG: 10 TABLET ORAL at 08:09

## 2018-01-01 RX ADMIN — AMLODIPINE BESYLATE 5 MG: 5 TABLET ORAL at 09:23

## 2018-01-01 RX ADMIN — SODIUM CHLORIDE 15 ML: 900 IRRIGANT IRRIGATION at 16:29

## 2018-01-01 RX ADMIN — TACROLIMUS 0.5 MG: 0.5 CAPSULE ORAL at 08:15

## 2018-01-01 RX ADMIN — ACETAMINOPHEN 650 MG: 325 TABLET, FILM COATED ORAL at 21:39

## 2018-01-01 RX ADMIN — CASTOR OIL AND BALSAM, PERU: 788; 87 OINTMENT TOPICAL at 20:25

## 2018-01-01 RX ADMIN — TACROLIMUS 1.5 MG: 0.5 CAPSULE ORAL at 08:08

## 2018-01-01 RX ADMIN — BACLOFEN 10 MG: 10 TABLET ORAL at 15:58

## 2018-01-01 RX ADMIN — Medication 10 ML: at 20:34

## 2018-01-01 RX ADMIN — URSODIOL 250 MG: 250 TABLET, FILM COATED ORAL at 17:43

## 2018-01-01 RX ADMIN — INSULIN HUMAN 8 UNITS: 100 INJECTION, SOLUTION PARENTERAL at 17:37

## 2018-01-01 RX ADMIN — POTASSIUM CHLORIDE 20 MEQ: 400 INJECTION, SOLUTION INTRAVENOUS at 06:50

## 2018-01-01 RX ADMIN — POTASSIUM CHLORIDE AND SODIUM CHLORIDE: 900; 150 INJECTION, SOLUTION INTRAVENOUS at 02:34

## 2018-01-01 RX ADMIN — LINEZOLID 600 MG: 600 INJECTION, SOLUTION INTRAVENOUS at 20:15

## 2018-01-01 RX ADMIN — FUROSEMIDE 20 MG: 10 INJECTION, SOLUTION INTRAMUSCULAR; INTRAVENOUS at 11:10

## 2018-01-01 RX ADMIN — AMLODIPINE BESYLATE 5 MG: 5 TABLET ORAL at 08:37

## 2018-01-01 RX ADMIN — URSODIOL 250 MG: 250 TABLET, FILM COATED ORAL at 05:51

## 2018-01-01 RX ADMIN — SODIUM CHLORIDE 250 ML: 9 INJECTION, SOLUTION INTRAVENOUS at 03:53

## 2018-01-01 RX ADMIN — MEROPENEM 500 MG: 500 INJECTION, POWDER, FOR SOLUTION INTRAVENOUS at 15:29

## 2018-01-01 RX ADMIN — Medication 500 ML/HR: at 07:51

## 2018-01-01 RX ADMIN — OXYCODONE HYDROCHLORIDE 5 MG: 5 TABLET ORAL at 00:39

## 2018-01-01 RX ADMIN — MICONAZOLE NITRATE: 20 POWDER TOPICAL at 20:17

## 2018-01-01 RX ADMIN — MEROPENEM 500 MG: 500 INJECTION, POWDER, FOR SOLUTION INTRAVENOUS at 22:45

## 2018-01-01 RX ADMIN — ZOLPIDEM TARTRATE 5 MG: 5 TABLET ORAL at 21:28

## 2018-01-01 RX ADMIN — FUROSEMIDE 10 MG/HR: 10 INJECTION, SOLUTION INTRAMUSCULAR; INTRAVENOUS at 01:58

## 2018-01-01 RX ADMIN — URSODIOL 250 MG: 250 TABLET, FILM COATED ORAL at 16:28

## 2018-01-01 RX ADMIN — PANTOPRAZOLE SODIUM 40 MG: 40 INJECTION, POWDER, FOR SOLUTION INTRAVENOUS at 09:34

## 2018-01-01 RX ADMIN — URSODIOL 250 MG: 250 TABLET, FILM COATED ORAL at 05:56

## 2018-01-01 RX ADMIN — CASTOR OIL AND BALSAM, PERU: 788; 87 OINTMENT TOPICAL at 20:17

## 2018-01-01 RX ADMIN — INSULIN HUMAN 4 UNITS: 100 INJECTION, SOLUTION PARENTERAL at 07:09

## 2018-01-01 RX ADMIN — LEVOFLOXACIN 500 MG: 500 TABLET, FILM COATED ORAL at 21:29

## 2018-01-01 RX ADMIN — FLUDARABINE PHOSPHATE 55 MG: 25 INJECTION, SOLUTION INTRAVENOUS at 10:12

## 2018-01-01 RX ADMIN — URSODIOL 250 MG: 250 TABLET, FILM COATED ORAL at 12:54

## 2018-01-01 RX ADMIN — PROPRANOLOL HYDROCHLORIDE 80 MG: 20 TABLET ORAL at 08:45

## 2018-01-01 RX ADMIN — TACROLIMUS 0.5 MG: 0.5 CAPSULE ORAL at 08:03

## 2018-01-01 RX ADMIN — INSULIN HUMAN 4 UNITS: 100 INJECTION, SOLUTION PARENTERAL at 00:09

## 2018-01-01 RX ADMIN — Medication 1 MG: at 21:36

## 2018-01-01 RX ADMIN — CALCIUM GLUCONATE: 94 INJECTION, SOLUTION INTRAVENOUS at 15:57

## 2018-01-01 RX ADMIN — MORPHINE SULFATE 4 MG: 4 INJECTION INTRAVENOUS at 11:42

## 2018-01-01 RX ADMIN — URSODIOL 250 MG: 250 TABLET, FILM COATED ORAL at 16:59

## 2018-01-01 RX ADMIN — BACLOFEN 10 MG: 10 TABLET ORAL at 20:47

## 2018-01-01 RX ADMIN — TACROLIMUS 2.5 MG: 0.5 CAPSULE ORAL at 21:32

## 2018-01-01 RX ADMIN — VASOPRESSIN 0.04 UNITS/MIN: 20 INJECTION INTRAVENOUS at 22:09

## 2018-01-01 RX ADMIN — PROPRANOLOL HYDROCHLORIDE 20 MG: 20 TABLET ORAL at 14:27

## 2018-01-01 RX ADMIN — ACETAMINOPHEN 650 MG: 325 TABLET, FILM COATED ORAL at 17:39

## 2018-01-01 RX ADMIN — ALBUMIN (HUMAN) 12.5 G: 0.25 INJECTION, SOLUTION INTRAVENOUS at 15:48

## 2018-01-01 RX ADMIN — LEVALBUTEROL HYDROCHLORIDE 0.63 MG: 0.63 SOLUTION RESPIRATORY (INHALATION) at 12:52

## 2018-01-01 RX ADMIN — SODIUM CHLORIDE 10 ML: 9 INJECTION, SOLUTION INTRAMUSCULAR; INTRAVENOUS; SUBCUTANEOUS at 11:45

## 2018-01-01 RX ADMIN — VALACYCLOVIR HYDROCHLORIDE 500 MG: 500 TABLET, FILM COATED ORAL at 21:29

## 2018-01-01 RX ADMIN — LEVALBUTEROL HYDROCHLORIDE 0.63 MG: 0.63 SOLUTION RESPIRATORY (INHALATION) at 11:53

## 2018-01-01 RX ADMIN — SODIUM CHLORIDE 15 ML: 900 IRRIGANT IRRIGATION at 08:51

## 2018-01-01 RX ADMIN — MICAFUNGIN SODIUM 50 MG: 10 INJECTION, POWDER, LYOPHILIZED, FOR SOLUTION INTRAVENOUS at 10:56

## 2018-01-01 RX ADMIN — BACLOFEN 10 MG: 10 TABLET ORAL at 21:32

## 2018-01-01 RX ADMIN — MORPHINE SULFATE 4 MG: 4 INJECTION INTRAVENOUS at 14:22

## 2018-01-01 RX ADMIN — TBO-FILGRASTIM 300 MCG: 300 INJECTION, SOLUTION SUBCUTANEOUS at 18:01

## 2018-01-01 RX ADMIN — PANTOPRAZOLE SODIUM 40 MG: 40 INJECTION, POWDER, FOR SOLUTION INTRAVENOUS at 13:38

## 2018-01-01 RX ADMIN — SODIUM CHLORIDE 15 ML: 900 IRRIGANT IRRIGATION at 11:37

## 2018-01-01 RX ADMIN — TERBINAFINE HYDROCHLORIDE: 1 CREAM TOPICAL at 09:14

## 2018-01-01 RX ADMIN — INSULIN HUMAN 4 UNITS: 100 INJECTION, SOLUTION PARENTERAL at 18:05

## 2018-01-01 RX ADMIN — MAGNESIUM SULFATE IN WATER 4 G: 40 INJECTION, SOLUTION INTRAVENOUS at 05:24

## 2018-01-01 RX ADMIN — Medication 10 ML: at 09:06

## 2018-01-01 RX ADMIN — URSODIOL 250 MG: 250 TABLET, FILM COATED ORAL at 06:00

## 2018-01-01 RX ADMIN — LINEZOLID 600 MG: 600 INJECTION, SOLUTION INTRAVENOUS at 21:29

## 2018-01-01 RX ADMIN — Medication 2500 ML/HR: at 02:01

## 2018-01-01 RX ADMIN — Medication 10 ML: at 21:08

## 2018-01-01 RX ADMIN — CETIRIZINE HYDROCHLORIDE 10 MG: 10 TABLET, FILM COATED ORAL at 11:57

## 2018-01-01 RX ADMIN — TACROLIMUS 0.5 MG: 0.5 CAPSULE ORAL at 09:33

## 2018-01-01 RX ADMIN — PROPRANOLOL HYDROCHLORIDE 80 MG: 20 TABLET ORAL at 08:58

## 2018-01-01 RX ADMIN — MORPHINE SULFATE 2 MG: 2 INJECTION, SOLUTION INTRAMUSCULAR; INTRAVENOUS at 03:25

## 2018-01-01 RX ADMIN — CASTOR OIL AND BALSAM, PERU: 788; 87 OINTMENT TOPICAL at 21:02

## 2018-01-01 RX ADMIN — URSODIOL 250 MG: 250 TABLET, FILM COATED ORAL at 21:24

## 2018-01-01 RX ADMIN — TBO-FILGRASTIM 300 MCG: 300 INJECTION, SOLUTION SUBCUTANEOUS at 18:13

## 2018-01-01 RX ADMIN — LEVALBUTEROL HYDROCHLORIDE 0.63 MG: 0.63 SOLUTION RESPIRATORY (INHALATION) at 10:38

## 2018-01-01 RX ADMIN — ONDANSETRON 8 MG: 2 INJECTION INTRAMUSCULAR; INTRAVENOUS at 21:40

## 2018-01-01 RX ADMIN — POTASSIUM CHLORIDE 20 MEQ: 400 INJECTION, SOLUTION INTRAVENOUS at 13:01

## 2018-01-01 RX ADMIN — Medication 10 ML: at 20:58

## 2018-01-01 RX ADMIN — PROPRANOLOL HYDROCHLORIDE 20 MG: 20 TABLET ORAL at 21:07

## 2018-01-01 RX ADMIN — IOPAMIDOL 80 ML: 755 INJECTION, SOLUTION INTRAVENOUS at 18:25

## 2018-01-01 RX ADMIN — MICAFUNGIN SODIUM 150 MG: 10 INJECTION, POWDER, LYOPHILIZED, FOR SOLUTION INTRAVENOUS at 10:58

## 2018-01-01 RX ADMIN — CASTOR OIL AND BALSAM, PERU: 788; 87 OINTMENT TOPICAL at 11:00

## 2018-01-01 RX ADMIN — Medication 5 ML: at 18:43

## 2018-01-01 RX ADMIN — CASTOR OIL AND BALSAM, PERU: 788; 87 OINTMENT TOPICAL at 23:27

## 2018-01-01 RX ADMIN — SODIUM CHLORIDE 15 ML: 900 IRRIGANT IRRIGATION at 07:30

## 2018-01-01 RX ADMIN — BACLOFEN 10 MG: 10 TABLET ORAL at 09:07

## 2018-01-01 RX ADMIN — OXYCODONE HYDROCHLORIDE 2.5 MG: 5 TABLET ORAL at 21:44

## 2018-01-01 RX ADMIN — BACLOFEN 10 MG: 10 TABLET ORAL at 08:38

## 2018-01-01 RX ADMIN — ACYCLOVIR SODIUM 450 MG: 50 INJECTION, SOLUTION INTRAVENOUS at 20:05

## 2018-01-01 RX ADMIN — MEROPENEM 1 G: 1 INJECTION, POWDER, FOR SOLUTION INTRAVENOUS at 02:18

## 2018-01-01 RX ADMIN — TACROLIMUS 0.5 MG: 0.5 CAPSULE ORAL at 21:26

## 2018-01-01 RX ADMIN — URSODIOL 250 MG: 250 TABLET, FILM COATED ORAL at 17:19

## 2018-01-01 RX ADMIN — SODIUM CHLORIDE 15 ML: 900 IRRIGANT IRRIGATION at 06:50

## 2018-01-01 RX ADMIN — NOREPINEPHRINE BITARTRATE 12 MCG/MIN: 1 INJECTION INTRAVENOUS at 21:26

## 2018-01-01 RX ADMIN — AMLODIPINE BESYLATE 5 MG: 5 TABLET ORAL at 08:58

## 2018-01-01 RX ADMIN — LOPERAMIDE HYDROCHLORIDE 2 MG: 2 CAPSULE ORAL at 21:43

## 2018-01-01 RX ADMIN — LORAZEPAM 0.5 MG: 2 INJECTION INTRAMUSCULAR; INTRAVENOUS at 01:17

## 2018-01-01 RX ADMIN — URSODIOL 250 MG: 250 TABLET, FILM COATED ORAL at 07:33

## 2018-01-01 RX ADMIN — TACROLIMUS 0.5 MG: 0.5 CAPSULE ORAL at 20:57

## 2018-01-01 RX ADMIN — Medication 10 ML: at 11:55

## 2018-01-01 RX ADMIN — PIPERACILLIN SODIUM,TAZOBACTAM SODIUM 4.5 G: 4; .5 INJECTION, POWDER, FOR SOLUTION INTRAVENOUS at 22:59

## 2018-01-01 RX ADMIN — MORPHINE SULFATE 4 MG: 4 INJECTION INTRAVENOUS at 20:46

## 2018-01-01 RX ADMIN — Medication 10 ML: at 09:34

## 2018-01-01 RX ADMIN — PANTOPRAZOLE SODIUM 40 MG: 40 INJECTION, POWDER, FOR SOLUTION INTRAVENOUS at 10:26

## 2018-01-01 RX ADMIN — ALBUMIN (HUMAN) 12.5 G: 0.25 INJECTION, SOLUTION INTRAVENOUS at 09:16

## 2018-01-01 RX ADMIN — DIPHENHYDRAMINE HYDROCHLORIDE 12.5 MG: 50 INJECTION, SOLUTION INTRAMUSCULAR; INTRAVENOUS at 05:45

## 2018-01-01 RX ADMIN — FLUCONAZOLE 400 MG: 200 TABLET ORAL at 08:37

## 2018-01-01 ASSESSMENT — PAIN DESCRIPTION - PROGRESSION
CLINICAL_PROGRESSION: NOT CHANGED
CLINICAL_PROGRESSION: GRADUALLY WORSENING
CLINICAL_PROGRESSION: NOT CHANGED
CLINICAL_PROGRESSION: GRADUALLY WORSENING
CLINICAL_PROGRESSION: NOT CHANGED
CLINICAL_PROGRESSION: GRADUALLY WORSENING
CLINICAL_PROGRESSION: NOT CHANGED
CLINICAL_PROGRESSION: GRADUALLY WORSENING
CLINICAL_PROGRESSION: NOT CHANGED
CLINICAL_PROGRESSION: NOT CHANGED
CLINICAL_PROGRESSION: GRADUALLY WORSENING
CLINICAL_PROGRESSION: NOT CHANGED
CLINICAL_PROGRESSION: GRADUALLY IMPROVING
CLINICAL_PROGRESSION: NOT CHANGED
CLINICAL_PROGRESSION: GRADUALLY WORSENING
CLINICAL_PROGRESSION: NOT CHANGED
CLINICAL_PROGRESSION_2: NOT CHANGED
CLINICAL_PROGRESSION: GRADUALLY WORSENING
CLINICAL_PROGRESSION: NOT CHANGED
CLINICAL_PROGRESSION: GRADUALLY WORSENING
CLINICAL_PROGRESSION: GRADUALLY WORSENING
CLINICAL_PROGRESSION: NOT CHANGED
CLINICAL_PROGRESSION: GRADUALLY WORSENING
CLINICAL_PROGRESSION: NOT CHANGED

## 2018-01-01 ASSESSMENT — PAIN DESCRIPTION - ONSET
ONSET: ON-GOING
ONSET: GRADUAL
ONSET: ON-GOING
ONSET: ON-GOING
ONSET: GRADUAL
ONSET: ON-GOING
ONSET: GRADUAL
ONSET: ON-GOING
ONSET_2: ON-GOING
ONSET: ON-GOING
ONSET: ON-GOING
ONSET: GRADUAL
ONSET: ON-GOING
ONSET: GRADUAL
ONSET: GRADUAL
ONSET: ON-GOING
ONSET: PROGRESSIVE
ONSET: ON-GOING

## 2018-01-01 ASSESSMENT — PAIN DESCRIPTION - ORIENTATION
ORIENTATION: INNER
ORIENTATION: INNER
ORIENTATION: LEFT;LOWER
ORIENTATION: INNER
ORIENTATION_2: INNER
ORIENTATION: INNER
ORIENTATION: LOWER
ORIENTATION: INNER
ORIENTATION: RIGHT;MID
ORIENTATION: OTHER (COMMENT)
ORIENTATION: INNER
ORIENTATION: RIGHT;LEFT;INNER
ORIENTATION: INNER
ORIENTATION: INNER
ORIENTATION: LEFT;LOWER
ORIENTATION: INNER
ORIENTATION: RIGHT;LEFT;INNER
ORIENTATION: LOWER
ORIENTATION: INNER
ORIENTATION: LEFT;LOWER
ORIENTATION: INNER
ORIENTATION: INNER
ORIENTATION: MID
ORIENTATION: LOWER
ORIENTATION: INNER
ORIENTATION: RIGHT;LEFT;INNER
ORIENTATION: LOWER;LEFT
ORIENTATION: INNER
ORIENTATION: RIGHT;LEFT;INNER
ORIENTATION: INNER

## 2018-01-01 ASSESSMENT — PAIN SCALES - GENERAL
PAINLEVEL_OUTOF10: 6
PAINLEVEL_OUTOF10: 7
PAINLEVEL_OUTOF10: 8
PAINLEVEL_OUTOF10: 0
PAINLEVEL_OUTOF10: 5
PAINLEVEL_OUTOF10: 9
PAINLEVEL_OUTOF10: 4
PAINLEVEL_OUTOF10: 7
PAINLEVEL_OUTOF10: 0
PAINLEVEL_OUTOF10: 5
PAINLEVEL_OUTOF10: 7
PAINLEVEL_OUTOF10: 4
PAINLEVEL_OUTOF10: 6
PAINLEVEL_OUTOF10: 0
PAINLEVEL_OUTOF10: 2
PAINLEVEL_OUTOF10: 2
PAINLEVEL_OUTOF10: 0
PAINLEVEL_OUTOF10: 6
PAINLEVEL_OUTOF10: 0
PAINLEVEL_OUTOF10: 4
PAINLEVEL_OUTOF10: 8
PAINLEVEL_OUTOF10: 5
PAINLEVEL_OUTOF10: 3
PAINLEVEL_OUTOF10: 4
PAINLEVEL_OUTOF10: 9
PAINLEVEL_OUTOF10: 7
PAINLEVEL_OUTOF10: 0
PAINLEVEL_OUTOF10: 2
PAINLEVEL_OUTOF10: 5
PAINLEVEL_OUTOF10: 0
PAINLEVEL_OUTOF10: 0
PAINLEVEL_OUTOF10: 3
PAINLEVEL_OUTOF10: 0
PAINLEVEL_OUTOF10: 4
PAINLEVEL_OUTOF10: 0
PAINLEVEL_OUTOF10: 7
PAINLEVEL_OUTOF10: 0
PAINLEVEL_OUTOF10: 5
PAINLEVEL_OUTOF10: 10
PAINLEVEL_OUTOF10: 0
PAINLEVEL_OUTOF10: 2
PAINLEVEL_OUTOF10: 0
PAINLEVEL_OUTOF10: 5
PAINLEVEL_OUTOF10: 6
PAINLEVEL_OUTOF10: 0
PAINLEVEL_OUTOF10: 7
PAINLEVEL_OUTOF10: 2
PAINLEVEL_OUTOF10: 7
PAINLEVEL_OUTOF10: 8
PAINLEVEL_OUTOF10: 9
PAINLEVEL_OUTOF10: 0
PAINLEVEL_OUTOF10: 6
PAINLEVEL_OUTOF10: 5
PAINLEVEL_OUTOF10: 0
PAINLEVEL_OUTOF10: 7
PAINLEVEL_OUTOF10: 0
PAINLEVEL_OUTOF10: 8
PAINLEVEL_OUTOF10: 4
PAINLEVEL_OUTOF10: 0
PAINLEVEL_OUTOF10: 5
PAINLEVEL_OUTOF10: 6
PAINLEVEL_OUTOF10: 0
PAINLEVEL_OUTOF10: 7
PAINLEVEL_OUTOF10: 0
PAINLEVEL_OUTOF10: 5
PAINLEVEL_OUTOF10: 0
PAINLEVEL_OUTOF10: 3
PAINLEVEL_OUTOF10: 7
PAINLEVEL_OUTOF10: 4
PAINLEVEL_OUTOF10: 0
PAINLEVEL_OUTOF10: 8
PAINLEVEL_OUTOF10: 0
PAINLEVEL_OUTOF10: 0
PAINLEVEL_OUTOF10: 6
PAINLEVEL_OUTOF10: 7
PAINLEVEL_OUTOF10: 0
PAINLEVEL_OUTOF10: 0
PAINLEVEL_OUTOF10: 6
PAINLEVEL_OUTOF10: 0
PAINLEVEL_OUTOF10: 0
PAINLEVEL_OUTOF10: 5
PAINLEVEL_OUTOF10: 0
PAINLEVEL_OUTOF10: 5
PAINLEVEL_OUTOF10: 5
PAINLEVEL_OUTOF10: 7
PAINLEVEL_OUTOF10: 7
PAINLEVEL_OUTOF10: 0
PAINLEVEL_OUTOF10: 3
PAINLEVEL_OUTOF10: 0
PAINLEVEL_OUTOF10: 6
PAINLEVEL_OUTOF10: 0
PAINLEVEL_OUTOF10: 5
PAINLEVEL_OUTOF10: 7
PAINLEVEL_OUTOF10: 0
PAINLEVEL_OUTOF10: 0
PAINLEVEL_OUTOF10: 4
PAINLEVEL_OUTOF10: 4
PAINLEVEL_OUTOF10: 0
PAINLEVEL_OUTOF10: 0
PAINLEVEL_OUTOF10: 5
PAINLEVEL_OUTOF10: 0
PAINLEVEL_OUTOF10: 8
PAINLEVEL_OUTOF10: 4
PAINLEVEL_OUTOF10: 0
PAINLEVEL_OUTOF10: 7
PAINLEVEL_OUTOF10: 0
PAINLEVEL_OUTOF10: 5
PAINLEVEL_OUTOF10: 0
PAINLEVEL_OUTOF10: 0
PAINLEVEL_OUTOF10: 5
PAINLEVEL_OUTOF10: 0
PAINLEVEL_OUTOF10: 2
PAINLEVEL_OUTOF10: 0
PAINLEVEL_OUTOF10: 6
PAINLEVEL_OUTOF10: 7
PAINLEVEL_OUTOF10: 0
PAINLEVEL_OUTOF10: 4
PAINLEVEL_OUTOF10: 5
PAINLEVEL_OUTOF10: 0
PAINLEVEL_OUTOF10: 7
PAINLEVEL_OUTOF10: 4
PAINLEVEL_OUTOF10: 5
PAINLEVEL_OUTOF10: 7
PAINLEVEL_OUTOF10: 0
PAINLEVEL_OUTOF10: 0
PAINLEVEL_OUTOF10: 7
PAINLEVEL_OUTOF10: 0
PAINLEVEL_OUTOF10: 0
PAINLEVEL_OUTOF10: 6
PAINLEVEL_OUTOF10: 0
PAINLEVEL_OUTOF10: 7
PAINLEVEL_OUTOF10: 0
PAINLEVEL_OUTOF10: 5
PAINLEVEL_OUTOF10: 0
PAINLEVEL_OUTOF10: 4
PAINLEVEL_OUTOF10: 0
PAINLEVEL_OUTOF10: 6
PAINLEVEL_OUTOF10: 0
PAINLEVEL_OUTOF10: 6
PAINLEVEL_OUTOF10: 6
PAINLEVEL_OUTOF10: 0
PAINLEVEL_OUTOF10: 7
PAINLEVEL_OUTOF10: 0
PAINLEVEL_OUTOF10: 3

## 2018-01-01 ASSESSMENT — PAIN DESCRIPTION - DESCRIPTORS
DESCRIPTORS: SORE;BURNING
DESCRIPTORS: SORE
DESCRIPTORS: BURNING
DESCRIPTORS: SORE;BURNING
DESCRIPTORS: ACHING
DESCRIPTORS: SORE
DESCRIPTORS: ACHING;BURNING
DESCRIPTORS: BURNING;SORE
DESCRIPTORS: BURNING
DESCRIPTORS: SORE
DESCRIPTORS: SORE
DESCRIPTORS: ACHING;CRAMPING;DISCOMFORT
DESCRIPTORS: SORE
DESCRIPTORS: DISCOMFORT;BURNING;SORE
DESCRIPTORS: ACHING
DESCRIPTORS: SORE;BURNING
DESCRIPTORS: SORE;BURNING
DESCRIPTORS: ACHING;SORE
DESCRIPTORS: BURNING;SORE
DESCRIPTORS: DISCOMFORT
DESCRIPTORS: SORE
DESCRIPTORS: BURNING
DESCRIPTORS: ACHING
DESCRIPTORS: SORE;BURNING
DESCRIPTORS: BURNING;DISCOMFORT;SORE
DESCRIPTORS: SORE
DESCRIPTORS: BURNING
DESCRIPTORS: SORE
DESCRIPTORS: BURNING
DESCRIPTORS: BURNING;SORE
DESCRIPTORS: CRAMPING;DISCOMFORT
DESCRIPTORS: BURNING;DISCOMFORT;SORE
DESCRIPTORS: ACHING
DESCRIPTORS: SORE
DESCRIPTORS: DISCOMFORT;TENDER
DESCRIPTORS: BURNING;SORE
DESCRIPTORS: SORE
DESCRIPTORS: BURNING;SORE
DESCRIPTORS: BURNING;DISCOMFORT
DESCRIPTORS: SORE
DESCRIPTORS: ACHING;CRAMPING;DISCOMFORT
DESCRIPTORS: ACHING;BURNING
DESCRIPTORS: SORE;BURNING
DESCRIPTORS: BURNING;DISCOMFORT
DESCRIPTORS: BURNING;SORE
DESCRIPTORS: BURNING;DISCOMFORT
DESCRIPTORS_2: BURNING
DESCRIPTORS: BURNING
DESCRIPTORS: ACHING;BURNING
DESCRIPTORS: BURNING;SORE
DESCRIPTORS: BURNING
DESCRIPTORS: SORE;BURNING
DESCRIPTORS: BURNING
DESCRIPTORS: SORE

## 2018-01-01 ASSESSMENT — PULMONARY FUNCTION TESTS
PIF_VALUE: 21
PIF_VALUE: 1
PIF_VALUE: 24
PIF_VALUE: 20
PIF_VALUE: 1
PIF_VALUE: 13
PIF_VALUE: 1
PIF_VALUE: 0
PIF_VALUE: 21
PIF_VALUE: 1
PIF_VALUE: 1
PIF_VALUE: 0
PIF_VALUE: 1
PIF_VALUE: 1
PIF_VALUE: 21
PIF_VALUE: 23
PIF_VALUE: 1
PIF_VALUE: 1
PIF_VALUE: 0
PIF_VALUE: 1
PIF_VALUE: 19
PIF_VALUE: 1
PIF_VALUE: 18
PIF_VALUE: 1
PIF_VALUE: 0
PIF_VALUE: 1
PIF_VALUE: 18
PIF_VALUE: 21
PIF_VALUE: 0
PIF_VALUE: 1
PIF_VALUE: 18
PIF_VALUE: 1
PIF_VALUE: 23
PIF_VALUE: 1
PIF_VALUE: 0

## 2018-01-01 ASSESSMENT — PAIN DESCRIPTION - PAIN TYPE
TYPE: ACUTE PAIN
TYPE: CHRONIC PAIN
TYPE: ACUTE PAIN
TYPE: CHRONIC PAIN
TYPE: CHRONIC PAIN
TYPE_2: ACUTE PAIN
TYPE: ACUTE PAIN
TYPE: CHRONIC PAIN
TYPE: ACUTE PAIN
TYPE: CHRONIC PAIN
TYPE: ACUTE PAIN

## 2018-01-01 ASSESSMENT — PAIN DESCRIPTION - FREQUENCY
FREQUENCY: CONTINUOUS
FREQUENCY: INTERMITTENT
FREQUENCY: CONTINUOUS
FREQUENCY: CONTINUOUS
FREQUENCY: INTERMITTENT
FREQUENCY: CONTINUOUS
FREQUENCY: INTERMITTENT
FREQUENCY: INTERMITTENT
FREQUENCY: CONTINUOUS
FREQUENCY: INTERMITTENT
FREQUENCY: CONTINUOUS
FREQUENCY: INTERMITTENT
FREQUENCY: CONTINUOUS
FREQUENCY: INTERMITTENT
FREQUENCY: CONTINUOUS
FREQUENCY: INTERMITTENT
FREQUENCY: CONTINUOUS
FREQUENCY: INTERMITTENT
FREQUENCY: CONTINUOUS
FREQUENCY: INTERMITTENT
FREQUENCY: CONTINUOUS
FREQUENCY: INTERMITTENT
FREQUENCY: CONTINUOUS
FREQUENCY: INTERMITTENT
FREQUENCY: CONTINUOUS

## 2018-01-01 ASSESSMENT — PAIN DESCRIPTION - LOCATION
LOCATION: THROAT;BUTTOCKS
LOCATION: MOUTH
LOCATION: BUTTOCKS
LOCATION: MOUTH;THROAT
LOCATION: THROAT;MOUTH
LOCATION: MOUTH;THROAT
LOCATION: MOUTH
LOCATION: ABDOMEN
LOCATION: BUTTOCKS
LOCATION: THROAT
LOCATION: MOUTH
LOCATION: BACK
LOCATION: MOUTH;THROAT
LOCATION: MOUTH
LOCATION: BUTTOCKS
LOCATION: BACK
LOCATION: ABDOMEN
LOCATION: MOUTH;THROAT
LOCATION: MOUTH
LOCATION: ABDOMEN
LOCATION: MOUTH
LOCATION: MOUTH;THROAT
LOCATION: MOUTH;THROAT
LOCATION: THROAT
LOCATION: THROAT
LOCATION: BACK
LOCATION: MOUTH
LOCATION: MOUTH
LOCATION: BUTTOCKS;THROAT
LOCATION: MOUTH
LOCATION: THROAT
LOCATION: ABDOMEN
LOCATION: PENIS;SCROTUM
LOCATION: THROAT
LOCATION: MOUTH
LOCATION: THROAT
LOCATION: BUTTOCKS
LOCATION: ABDOMEN
LOCATION: ABDOMEN
LOCATION: MOUTH
LOCATION_2: THROAT
LOCATION: MOUTH
LOCATION: RECTUM;BUTTOCKS
LOCATION: MOUTH;THROAT
LOCATION: BUTTOCKS;THROAT
LOCATION: THROAT;MOUTH
LOCATION: MOUTH
LOCATION: MOUTH;THROAT

## 2018-01-01 ASSESSMENT — ACTIVITIES OF DAILY LIVING (ADL)
EFFECT OF PAIN ON DAILY ACTIVITIES: COMFORT
EFFECT OF PAIN ON DAILY ACTIVITIES: COMFORT/SLEEP
EFFECT OF PAIN ON DAILY ACTIVITIES: COMFORT
EFFECT OF PAIN ON DAILY ACTIVITIES: COMFORT/EATING
EFFECT OF PAIN ON DAILY ACTIVITIES: COMFORT
EFFECT OF PAIN ON DAILY ACTIVITIES: REST
EFFECT OF PAIN ON DAILY ACTIVITIES: COMFORT
EFFECT OF PAIN ON DAILY ACTIVITIES: COMFORT/MOVEMENT
EFFECT OF PAIN ON DAILY ACTIVITIES: COMFORT
EFFECT OF PAIN ON DAILY ACTIVITIES: COMFORT/EATING
EFFECT OF PAIN ON DAILY ACTIVITIES: COMFORT
EFFECT OF PAIN ON DAILY ACTIVITIES: COMFORT/EATING
EFFECT OF PAIN ON DAILY ACTIVITIES: COMFORT
EFFECT OF PAIN ON DAILY ACTIVITIES: COMFORT/EATING
EFFECT OF PAIN ON DAILY ACTIVITIES: COMFORT
EFFECT OF PAIN ON DAILY ACTIVITIES: COMFORT

## 2018-01-01 ASSESSMENT — PAIN DESCRIPTION - DIRECTION
RADIATING_TOWARDS: THROAT
RADIATING_TOWARDS: BACK
RADIATING_TOWARDS: THROAT
RADIATING_TOWARDS: LLQ
RADIATING_TOWARDS: THROAT
RADIATING_TOWARDS: LLQ

## 2018-01-01 ASSESSMENT — ENCOUNTER SYMPTOMS
COUGH: 0
SHORTNESS OF BREATH: 1
ABDOMINAL PAIN: 0

## 2018-01-01 ASSESSMENT — PAIN DESCRIPTION - INTENSITY: RATING_2: 7

## 2018-01-01 ASSESSMENT — PAIN DESCRIPTION - DURATION: DURATION_2: CONTINUOUS

## 2018-01-01 ASSESSMENT — PAIN - FUNCTIONAL ASSESSMENT: PAIN_FUNCTIONAL_ASSESSMENT: 0-10

## 2018-08-14 NOTE — PROGRESS NOTES
Patient seen in OPO for labs and BMT evaluation teach with Taz Taylor RN. Accessed patient's port, gilberto labs, VS stable, patient DC'ed ambulatory, with wife and Pedraza Lose to Outpatient [facility] to register for future testing.

## 2018-08-14 NOTE — PROGRESS NOTES
25  REST    [facility] donor  Stem Cell Collection  #1  Day -1 19  TRANSPLANT - Infusion of donor stem cells      Day 0 20            Day +1 21 22   23 24 25 26 27 28 29 30 OCTOBER 1 2 3 4 5 6

## 2018-08-14 NOTE — PROGRESS NOTES
Transplant consultation visit-Allogeneic  I completed pre consult chart preparation. I met with Iain Gaines his spouse to discuss the preliminary information about allogeneic stem cell transplantation. 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 He has completed induction chemotherapy and 3 cycles of consolidation therapy. He has been seen at [facility] but they guided him to come to [place] where he has family member caregivers available to assist with post transplant recovery. He states that Emma Novoa has idneitfied an [facility] unrelated 10:10 matched donor. If he decides to come to [place] [address]  for transplant I will have the NMDP search transferred to us. The plan SHEBA conditioning regimen and allogeneic transplant. We reviewed  1. The overview of services provided at [facility]. and the [place] [address]. 2. The transplant TEAM members including the 4 transplant physicians and the monthly rotation schedule. 3. The fundamentals of stem cells, the types of stem cell transplants and their mechanism to treat disease. 4. The donor identification process. I have reviewed and given them the transplant process outline and the allogeneic transplant patients booklet. Additionally we toured the outpatient and inpatient unit. They have given me permission to contact their insurance company to verify insurance coverage for transplant at [facility]  I have answered their questions and they will call with their decision. They have verbalized understanding and agree to the plan.   Alice Gonzalez RN BSN OCN BMTCN [facility]  Transplant coordinator
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
## 2018-08-17 NOTE — PROGRESS NOTES
Nilsa Guerra  Pre Allogeneic Stem Cell Transplant teaching   Mr. Tracy Gupta is here with his wife and caregiver for pre transplant teaching and to discuss the  caregiver plan. The plan is for pt to be admitted in September for reduce intensity conditioning with Melphalan and Fludara & an allogeneic transplant with a matched unrelated donor. Pt and family were educated on prep regimen - expected side effects and how these are typically managed. A clean catch urinalysis and culture were collected today. 1. Discussed daily routine/schedule while inpatient on the BMTU. 2. Pt was informed of chlor-hexadine body wash, VRE rectal swab; expected activity level - pt is aware that he is expected to be out of bed as much as possible, daily walks on unit, use of available exercise equipment. 3.Talked about infusion of stem cells from matched unrelated donor through his central venous catheter. 4. Talked about expected period of pancytopenia - steps taken to support him during this time. 5.Focused much of discussion on post txp follow up, need for dedicated caregiver and expectations of caregiver role. Pt has identified wife as primary caregiver with back up from  their 2 sons. The  pt intends to stay at [facility] after discharge and for 100 days. They have agreed to this as a firm expectation. 6. 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 7. Stressed importance of early communication from pt and/or caregiver in the outpatient setting. 8. Spent approximately 90 min with patient and his wife. 9. I answered multiple questions, and understanding was verbalized.   Nga TAMAYON RN OCN BMTCN [facility]   Transplant coordinator

## 2018-08-22 NOTE — PROGRESS NOTES
MrMarito & Mrs. [facility] are her today to further review the transplant educational information. We discussed and reviewed-  1. The updated transplant calendar with the confirmed donor plan. 2. Inpatient experience and the recipient active participation. 3. 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 GVHD Prevention and treatment. 4. Post transplant follow up  5. Caregiver responsibilities- Need for back up caregivers and the need for the coordinator to prepare them. 6 Post transplant medication management  7. Labs drawn and shipped-VNTR- Host pre transplant sample     Repeat HLA - blood and buccal swabs     [facility] repository sample  8. He has been set up to see Dr. Anya Low- For a pre transplant evaluation and prehabilitation. He has been advised to increase his daily activity such as walking to prepare himself for transplant. 9. Dentation. He has recently seen the dentist for cleaning and exam. In May 2018 he had a root cancel and it is causing some oral sensitivity, he will see the endodontist next week. 10. Ingrown toe nail.- I will set him up to see Dr. Talat Mobley to address this before transplant. 6. Dr. Yo Padgett reviewed the pre transplant test results and proposed the FATE Prot immune trial.        He has signed the consent & I will send the request to the [facility].   Mariella Arango RN BSN  OCN  BMTCN [facility]  Transplant Coordinator  [facility]    18 REVISED  Edilberto PAZ 1945    TARGET PLAN-                                   SUBJECT TO CHANGE  This is the plan is dependent on the donor availability Diagnosis: AML  Hemochromatosis   Physician:                                Ana Pickard MD  [phone] Transplant coordinator-   Mariella Arango RN  [phone] Catheter- Power port  On Admission will place a      Triple Lumen Garcia catheter      14  * Bring medication bottles for review    9:30 am [facility] office  Waleska Lamar  -Review plan   - Review Homework - Lab work- pre transplant labs & Repeat HLA typing  11:45 am                      [address]  3:30 pm [facility] office  Maritza Mauricio                 55                  29 22  * Return completed homework    10:30 am [facility] office-                  Jd Roblero- Review Plan & completed homework    11:30 am [facility] office  Shana Garcia - Psychologist   18   19 20 21  * Bring medication bottles for review    1:45 pm   [facility] office  [address] counselor- Discuss LLS pearl    2:30 pm   [facility] office - Jd Roblero Review    4 pm   Dr. Joel Poole- Review results   Review Plan  Rx Actigall 22  (Interoffice josesito- Submit to [facility] for Approval) 23 24 25   26 27 28   29 30   31 SEPTEMBER 1   2 [address]    [facility] donor  Physical Exam 4 5  * Bring medication bottles for review    1:30 pm   [facility] office    Jd Roblero- Review  3:15 pm Dr. Joel Poole -              Bone marrow Biopsy 6  Start                           Actigall Medication    2 pm [facility] office  Family Meeting   7  9:30 am                        Dr. Jayleen Villalba Consultation  [address] [place] [address]  Phone  8  [facility] donor  Requested Date                     for Donor Clearance 11  (Interoffice josesito                         Protocol meeting review) 12   27   30       99         81   30   76   96  * Bring medication bottles for review    ___ [facility] office   Jd Roblero RN Review plan & consents   Pre op Labs  ___ Dr. Joel Poole -               Pre admit Exam    * Nothing by mouth after 12 midnight 20  Admission for Transplant  5:30 am                         [address]  7:15 am Surgery-               Triple Lumen Catheter Placement  Dr. Adela Guevara  Day -6 21  FLUDARA CHEMOTHERAPY                  Day -5 22  FLUDARA CHEMOTHERAPY                  Day

## 2018-09-05 NOTE — PROGRESS NOTES
Fiorella Renteria was seen today and the plan was reviewed. His donor has agreed to participate in the ProTmune PT-001 clinical trial.   Medications were reviewed and list updated. He has been advised again to discontinue the Allopurinol. He has agreed to do this. He continues to recover form upper respiratory infection. He will complete Amoxicillin 875 mg on 9/6/18. A bone marrow biopsy was completed. Dr. Ángel Arnett has ordered a liver- spleen scan. He will return on 9/12/18.   Johnita Duverney RN BSN  OCN  BMTCN [facility]  Transplant Coordinator  [facility]    26 27 28 29 30 31 SEPTEMBER 1   2 3  [facility] donor  Physical Exam 4 5  [facility] Donor               Pre BMT labs to arrive    * Bring medication bottles for review  1:30 pm   [facility] office    Johnita Duverney- Review  3:15 pm Dr. Ángel Arnett -              Bone marrow Biopsy 6  Start                           Actigall Medication    2 pm [facility] office  Family Meeting   7  9:30 am                        Dr. Donald Castro Consultation  [address] [place] [address]  Phone [phone]   8   1 19  [facility] donor  Requested Date                     for Donor Clearance 11  (Interoffice josesito                         Protocol meeting review) 12  2:15 pm                       Dr. Dinora Donis  [address]  Phone [phone]  3:30 pm Dr. Hunter Mayer 13   14       15         16   17   18   19  * Bring medication bottles for review  2:15 pm [facility] office   Johnita Duverney RN Review plan & consents   Pre op Labs  3:15 pm  Dr. Ángel Arnett -               Pre admit Exam    * Nothing by mouth after 12 midnight 20  Admission for Transplant  5:30 am                         [facility] Registration  7:15 am Surgery-               Remove [facility] Triple Lumen Catheter Placement  Dr. Ziegler Delay  Day -6 21  FLUDARA CHEMOTHERAPY                  Day -5 22  FLUDARA

## 2018-09-12 NOTE — FLOWSHEET NOTE
Port accessed per protocol for [facility] scan with 19 gauge 0.75\" Power port access needle, one attempt, brisk blood return, flushes easily, Tegaderm and Biopatch applied. After scan was complete Port was flushed with heparin 100 units/ml, 500 units, and  was de-accessed without complication, band-aid was applied.

## 2018-09-18 NOTE — PROGRESS NOTES
[facility]                          Date of Procedure 9-20 Time of [id number]    PRIOR TO PROCEDURE DATE:  1. Please follow any guidelines/instructions prior to your procedure as advised by your surgeon. 2. Arrange for someone to drive you home and be with you for the first 24 hours after discharge for your safety after your procedure for which you received sedation. Ensure it is someone we can share information with regarding your discharge. 3. You must contact your surgeon for instructions IF:   You are taking any blood thinners, aspirin, anti-inflammatory or vitamin E.   There is a change in your physical condition such as a cold, fever, rash, cuts, sores or any other infection, especially near your surgical site. 4. Do not drink alcohol the day before or day of your procedure. 5. A Pre-op History and Physical for surgery MUST be completed by your Physician or Urgent Care within 30 days of your procedure date. Please bring a copy with you on the day of your procedure and along with any other testing performed. THE DAY OF YOUR PROCEDURE:  1. Follow instructions for ARRIVAL TIME as DIRECTED BY YOUR SURGEON. If your surgeon does not give you a specific arrival time, please arrive at 0530.    2. Enter the MAIN entrance from [address] [place] [address] Street and follow the signs to the free Sidelines or Online-ORling parking (offered free of charge 6am-5pm). 3. Enter the Main Entrance of the hospital (do not enter from the lower level of the parking garage). Upon entrance, check in with the  at the main desk on your left. If no one is available at the desk, proceed into the [facility] Waiting Room and go through the door directly into the [facility]. There is a Check-in desk ACROSS from Room 5 (marked with a sign hanging from the ceiling). The phone number for the surgery center is [phone].     4. Please call [phone] option #2 option #2 if

## 2018-09-18 NOTE — PROGRESS NOTES
Spoke with [facility] at Dr. Lexx Sands office. Pt states he is meeting with Dr. Moe Melendez to see if he can still have BMT.  Also, pt states he is allergic to Keflex, antibiotic order to be assessed by Dr. Edgar Santos

## 2018-09-19 NOTE — PROGRESS NOTES
Tex Quiles. I met with Mr. Berenice Rasmussen and his spouse. We reviewed the allogeneic transplant evaluation and plan. We reviewed the  1. The transplant plan calendar  2. Inpatient experience,the  discharge plan,precautions and restrictions and caregiver responsibilities  3. I witness the signing of the transplant consent   4. We reviewed his medication and medication list was updated. 5. CBC and pre op labs were completed,reviewed and is acceptable  6 [facility] donor clearance documents were emailed to [facility] cell processing lab. 7. Pre op packet was faxed to [facility] and emailed to Dr. Ramez Valdivia. 8. Admission packet has been given to [facility]. Patient and caregiver asked multiple questions and explanations were given. Patient and caregiver verbalized understanding. They agree to the plan.    Leah Hampton RN BSN OCN BMTCN [facility]  Transplant coordinator

## 2018-09-19 NOTE — H&P
Past Medical History:   Diagnosis Date    Arthritis     Cancer ([facility] [address]     AML    Dental crowns present     multiple upper and lower, plus one implant - top front middle    Enlarged prostate     Hemochromatosis     phlebotomy monthly. no longer a problem    History of blood transfusion     Hx of blood clots 10/2017    Left groin    Hypertension     Shoulder pain, right        Prior to Admission medications    Medication Sig Start Date End Date Taking? Authorizing Provider   ursodiol (ACTIGALL) 300 MG capsule Take 300 mg by mouth 4 times daily (before meals and nightly)   Yes Historical Provider, MD   baclofen (LIORESAL) 10 MG tablet Take 10 mg by mouth 3 times daily   Yes Historical Provider, MD   acetaminophen (TYLENOL) 325 MG tablet Take 650 mg by mouth every 6 hours as needed for Pain   Yes Historical Provider, MD   ibuprofen (ADVIL;MOTRIN) 200 MG tablet Take 200 mg by mouth every 4-6 hours as needed for Pain   Yes Historical Provider, MD   propranolol (INDERAL) 80 MG tablet Take 80 mg by mouth daily    Yes Historical Provider, MD   amLODIPine (NORVASC) 5 MG tablet Take 5 mg by mouth daily. Yes Historical Provider, MD       Allergies   Allergen Reactions    Tegaderm Ag Mesh 2\"X2\" [Wound Dressings] Rash    Voriconazole Other (See Comments)     Hallucinations    Keflex [Cephalexin]      Irregular heartbeat and blood in urine       Family History   Problem Relation Age of Onset    Cancer Mother     Heart Disease Mother     High Blood Pressure Mother     Heart Disease Father     High Blood Pressure Father     High Blood Pressure Brother     Diabetes Child         Social History     Social History    Marital status:      Spouse name: N/A    Number of children: N/A    Years of education: N/A     Occupational History    Not on file.      Social History Main Topics    Smoking status: Never Smoker    Smokeless tobacco: Never Used    Alcohol use 0.0 oz/week     2 - 3 Cans of beer per hepatosplenomegaly  M/S:  Right shoulder pain to palpation, no erythema   EXTREMITIES: trace BLE edema, denies calf tenderness  NEURO: CN II - XII grossly intact  CATHETER: Left TLH (Barrat, 9/20/18) - no erythema   KPS:  80%    Laboratory Data:   CBC:   Recent Labs      09/19/18   1540   WBC  14.1*   HGB  12.4*   HCT  36.6*   MCV  98.8   PLT  386     BMP/Mag:  Recent Labs      09/19/18   1540   NA  137   K  4.2   CL  98*   CO2  26   PHOS  3.2   BUN  22*   CREATININE  0.7*     LIVP:   Recent Labs      09/19/18   1540   AST  20   ALT  23   BILIDIR  0.3   BILITOT  0.9   ALKPHOS  91     Coags:   Recent Labs      09/19/18   1540   PROTIME  15.8*   INR  1.39*   APTT  31.4     Uric Acid   Recent Labs      09/19/18   1540   LABURIC  5.9     No results found for: TACROLEV     PROBLEM LIST:         1. 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 Hereditary hemochromatosis - needs post BMT phlebotomy   3. LLE DVT (11/2017)  4. Right sided chest pain  5. HTN  6. Gout      TREATMENT:        1. Vidaza x 1 cycle (10/30/17)   2. Vyxeos (12/30/2017  3. Intermediate Dose Anna-C x 3 cycles (2/2018 - 4/2018)  4. MUD Allo-pbpc BMT (ProTmune protocol)     ASSESSMENT AND PLAN:        1. AML:  Currently in CR  - Admit for MUD Allo-pbpc BMT (enrolled on ProTmune protocol)    Dr. Loni Crespo has discussed the risks associated with transplant which include the risk of infection, bleeding and inability to obtain a long term remission. He understands these risks and is willing to proceed. The consent is signed and on the chart. Preparative Regimen: Melphalan and Fludarabine  Date of BMT:  9/27/18  Source of stem cells: PBPC  Donor/Recipient Blood Type:  B Positive / B Positive  Donor Sex:  Male / NMDP (DID# [id number])   CMV Donor / Recipient: Negative / Negative     Day - 7    2. ID:  No evidence of infection.    - Start Valtrex prophylaxis    - Start Diflucan prophylaxis on 9/24/18  - Start Levaquin when [address] < 1.5     3.   Heme:  Anemia

## 2018-09-20 PROBLEM — C92.01 AML (ACUTE MYELOID LEUKEMIA) IN REMISSION (HCC): Status: ACTIVE | Noted: 2018-01-01

## 2018-09-20 NOTE — ANESTHESIA POSTPROCEDURE EVALUATION
Department of Anesthesiology  Postprocedure Note    Patient: Jamin Rodriguez  MRN: [id number]  YOB: 1945  Date of evaluation: 9/20/2018  Time:  9:34 AM     Procedure Summary     Date:  09/20/18 Room / Location:  [facility] / [facility]    Anesthesia Start:  0719 Anesthesia Stop:  4117    Procedures:       TRIPLE LUMEN NUNO PLACEMENT UNDER FLUOROSCOPY (Left )      PORT REMOVAL (Left ) Diagnosis:  (AML C92.00)    Surgeon:  Reuben Grijalva MD Responsible Provider:  Galindo Cartagena MD    Anesthesia Type:  general ASA Status:  3          Anesthesia Type: general    Penny Phase I: Penny Score: 6    Penny Phase II:      Last vitals: Reviewed and per EMR flowsheets.        Anesthesia Post Evaluation    Patient location during evaluation: PACU  Patient participation: complete - patient participated  Level of consciousness: awake and alert  Pain score: 0  Airway patency: patent  Nausea & Vomiting: no nausea and no vomiting  Complications: no  Cardiovascular status: hemodynamically stable  Respiratory status: acceptable  Hydration status: euvolemic

## 2018-09-20 NOTE — ANESTHESIA PRE PROCEDURE
diphenhydrAMINE (BENADRYL) injection 12.5 mg  12.5 mg Intravenous Once PRN Sergio Solis MD        metoclopramide [facility]) injection 10 mg  10 mg Intravenous Once PRN Sergio Solis MD        promethazine [facility]) injection 6.25 mg  6.25 mg Intravenous Once PRN Sergio Solis MD        labetalol (NORMODYNE;TRANDATE) injection 5 mg  5 mg Intravenous Q10 Min PRN Sergio Solis MD        hydrALAZINE (APRESOLINE) injection 5 mg  5 mg Intravenous Q10 Min PRN Sergio Solis MD        meperidine (DEMEROL) injection 12.5 mg  12.5 mg Intravenous Q5 Min PRN Sergio Solis MD           Allergies: Allergies   Allergen Reactions    Tegaderm Ag Mesh 2\"X2\" [Wound Dressings] Rash    Voriconazole Other (See Comments)     Hallucinations    Keflex [Cephalexin]      Irregular heartbeat and blood in urine       Problem List:    Patient Active Problem List   Diagnosis Code    Primary localized osteoarthrosis, lower leg M17.10    Tear of lateral cartilage or meniscus of knee, current S83.289A    Morbid obesity with BMI of 40.0-44.9, adult ([facility] E66.01, Z68.41    Cervical radiculopathy M54.12    Cervical stenosis of spine M48.02    Cervical spondylosis M47.812    Cervical radiculopathy at C7 M54.12       Past Medical History:        Diagnosis Date    Arthritis     Cancer ([facility] [address]     AML    Dental crowns present     multiple upper and lower, plus one implant - top front middle    Enlarged prostate     Hemochromatosis     phlebotomy monthly.  no longer a problem    History of blood transfusion     Hx of blood clots 10/2017    Left groin    Hypertension     Shoulder pain, right        Past Surgical History:        Procedure Laterality Date    BACK SURGERY      CARPAL TUNNEL RELEASE Right     CHOLECYSTECTOMY      DENTAL SURGERY      implant top front middle    FOOT SURGERY Right     triple arthrodesis    KNEE ARTHROSCOPY Right     x 2    KNEE ARTHROSCOPY Left     KNEE ARTHROSCOPY Left 6/25/14 partial lateral menisectomy medial meniscal chondroplasty    PROSTATE SURGERY      plasma button    TONSILLECTOMY         Social History:    Social History   Substance Use Topics    Smoking status: Never Smoker    Smokeless tobacco: Never Used    Alcohol use 0.0 oz/week     2 - 3 Cans of beer per week      Comment: rare ETOH now                                Counseling given: Not Answered      Vital Signs (Current):   Vitals:    09/18/18 0957 09/20/18 0550 09/20/18 0606   BP:   (!) 153/80   Pulse:   75   Resp:   18   Temp:   98.3 °F (36.8 °C)   TempSrc:   Oral   SpO2:   95%   Weight: 265 lb (120.2 kg) 265 lb (120.2 kg)    Height: 5' 9\" (1.753 m) 5' 9\" (1.753 m)                                               BP Readings from Last 3 Encounters:   09/20/18 (!) 153/80   08/14/18 139/77   10/09/14 140/80       NPO Status: Time of last liquid consumption: 2100                        Time of last solid consumption: 1800                        Date of last liquid consumption: 09/19/18                        Date of last solid food consumption: 09/19/18    BMI:   Wt Readings from Last 3 Encounters:   09/20/18 265 lb (120.2 kg)   08/14/18 266 lb 12.1 oz (121 kg)   10/09/14 294 lb (133.4 kg)     Body mass index is 39.13 kg/m².     CBC:   Lab Results   Component Value Date    WBC 14.1 09/19/2018    RBC 3.70 09/19/2018    HGB 12.4 09/19/2018    HCT 36.6 09/19/2018    MCV 98.8 09/19/2018    RDW 13.5 09/19/2018     09/19/2018       CMP:   Lab Results   Component Value Date     09/19/2018    K 4.2 09/19/2018    CL 98 09/19/2018    CO2 26 09/19/2018    BUN 22 09/19/2018    CREATININE 0.7 09/19/2018    GFRAA >60 09/19/2018    AGRATIO 1.2 09/19/2018    LABGLOM >60 09/19/2018    GLUCOSE 118 09/19/2018    PROT 7.7 09/19/2018    CALCIUM 9.5 09/19/2018    BILITOT 0.9 09/19/2018    ALKPHOS 91 09/19/2018    AST 20 09/19/2018    ALT 23 09/19/2018       POC Tests:   Recent Labs      09/20/18   0634   POCGLU  117*       Coags:

## 2018-09-20 NOTE — PROGRESS NOTES
Clinical Pharmacy Progress Note    Patient Name: Prateek Real  YOB: 1945  Diagnosis: AML    GVHD Prophylaxis:  Tacrolimus (Prograf) starting Day -2 per FATE study  · Adjusted according to levels - drawn via red lumen  Methotrexate 30 mg IVP days +1, followed by Methotrexate 20 mg IVP +3, +6, +11 =>        (per FATE study: MTX 15mg/m2 IVP d+1 and MTX 10mg/m2 IVP on +3, +6, +11)     Tacrolimus (Prograf) levels starting day +1  Tacrolimus (Prograf) goal level:  5-15 ng/mL per FATE study    Date SCr Bili Prograf Dose Prograf Level Adjustments / Comments   9/20/18 0.5 0.9 - - Patient admitted for ALLO Lite MUD- Melphlan/Fludara. Will start oral Prograf 2.5 mg po bid on 9/25/18 with 1st level on 9/28/18 and MWF thereafter                                                               Please call with questions. Joshua Benoit. [facility] Clinical Pharmacist  Wireless:  [phone]  9/20/2018 1:20 PM

## 2018-09-20 NOTE — CONSULTS
plasma button    TONSILLECTOMY       Current Medications:    Current Facility-Administered Medications: lactated ringers infusion, , Intravenous, Continuous  0.9 % sodium chloride infusion, , Intravenous, Continuous PRN  alteplase (CATHFLO) injection 2 mg, 2 mg, Intracatheter, PRN  [START ON 2018] enoxaparin (LOVENOX) injection 40 mg, 40 mg, Subcutaneous, Daily  [START ON 2018] fluconazole (DIFLUCAN) tablet 400 mg, 400 mg, Oral, Daily  magnesium hydroxide (MILK OF MAGNESIA) 400 MG/5ML suspension 10 mL, 10 mL, Oral, Daily PRN  magnesium sulfate 4 g in 100 mL IVPB premix, 4 g, Intravenous, PRN  potassium chloride 20 mEq/50 mL IVPB (Central Line), 20 mEq, Intravenous, PRN  Saline Mouthwash 15 mL, 15 mL, Swish & Spit, 4x Daily AC & HS  Saline Mouthwash 15 mL, 15 mL, Swish & Spit, Q4H PRN  sodium chloride flush 0.9 % injection 10 mL, 10 mL, Intravenous, 2 times per day  valACYclovir (VALTREX) tablet 500 mg, 500 mg, Oral, BID  acetaminophen (TYLENOL) tablet 650 mg, 650 mg, Oral, Q6H PRN  [START ON 2018] amLODIPine (NORVASC) tablet 5 mg, 5 mg, Oral, Daily  baclofen (LIORESAL) tablet 10 mg, 10 mg, Oral, TID  [START ON 2018] propranolol (INDERAL) tablet 80 mg, 80 mg, Oral, Daily  ursodiol (ACTIGALL) tablet 250 mg, 250 mg, Oral, 4x Daily AC & HS  [] dextrose 5 % and 0.45 % sodium chloride infusion, 1,000 mL, Intravenous, Continuous **FOLLOWED BY** dextrose 5 % and 0.45 % NaCl with KCl 10 mEq infusion, 1,000 mL, Intravenous, Continuous  sulfamethoxazole-trimethoprim (BACTRIM DS;SEPTRA DS) 800-160 MG per tablet 1 tablet, 1 tablet, Oral, BID  [START ON 2018] furosemide (LASIX) injection 40 mg, 40 mg, Intravenous, Q12H  prochlorperazine (COMPAZINE) tablet 10 mg, 10 mg, Oral, Q4H PRN **OR** prochlorperazine (COMPAZINE) injection 10 mg, 10 mg, Intravenous, Q4H PRN  LORazepam (ATIVAN) tablet 0.5 mg, 0.5 mg, Oral, Q4H PRN **OR** LORazepam (ATIVAN) injection 0.5 mg, 0.5 mg, Intravenous, Q4H PRN  [START PROT  7.7   --    INR  1.39*  1.35*   APTT  31.4  34.3     Pt is AAOx3 and in no acute distress. Left hallucal nail bed is dressed with a bandaid. No drainage to external or internal layer of dressing noted. VASCULAR: DP/PT pulses palpable 2/4 b/l. CFT <3 seconds to distal yessica of all digits b/l. Skin temp is warm to cool with no increased focal calor noted b/l. DERM: Previously avulsed hallux nail noted to the left. No drainage noted. No malodor noted. No erythema, edema, or purulence noted. No erythematous streaking noted. NEURO: Light touch sensation diminished b/l. Negative babinski b/l. MSK: Muscle strength 5/5 for all pedal groups tested b/l. No pain to palpation of left hallux. IMPRESSION/RECOMMENDATIONS:      1. S/p total nail avulsion due to subungual abscess, left hallux     - Patient seen and examined at bedside  - VSS, leukocytosis noted (WBC 12.1), but nail avulsion site is unlikely the source  - Nail bed inspected and dressed with a bandaid   - Instructed patient to follow up with Dr. Ricardo Solo in 1 month as instructed by her at their last appointment    DISPO: S/p hallux nail avulsion, left. Nail bed appears stable, showing no signs of acute infection, and is not the source of patients leukocytosis. The patient will follow up with Dr. Abena Lei DPM. Will sign off on the patient. If any other pedal problems occur please contact us. Thank you for the opportunity to take part in the patient's care. Dave Dang PGY1  Pager [phone]    Patient was seen and evaluated at bedside. Agree with residents assessment and treatment plan.   Abena Lei DPM

## 2018-09-20 NOTE — FLOWSHEET NOTE
09/20/18 1216   Encounter Summary   Services provided to: Patient and family together  (wife)   Referral/Consult From: Nursing Supervisor/Manager   Continue Visiting (es 9/20)   Complexity of Encounter Moderate   Length of Encounter 15 minutes   Spiritual Assessment Completed Yes   Routine   Type Initial   Assessment Approachable   Intervention Active listening;Discussed illness/injury and it's impact; Discussed belief system/Moravian practices/mery;Discussed relationship with God;Sustaining presence/ Ministry of presence   Outcome Engaged in conversation;Receptive   Pt is anxious about transplant. Both he and his wife have health issues, so that is a stressor, as well. Pt was raised [employer]ism, but is skeptical of organized [employer] due to hypocrisy that he has experienced in churches. His wife welcomes prayer.

## 2018-09-20 NOTE — OP NOTE
confirmed using fluoroscopy. An exit site was chosen, anesthetized, and incised 6 cm inferior to the venous access site. The Garcia triple lumen catheter was then tunneled subcutaneously to the exit site of the guidewire with the subcutaneous cuff positioned at the catheter skin exit site. Using fluoroscopy, the catheter tip position was estimated and then trimmed to appropriate size. The dilator and sheath were placed over the guidewire in the vena cava under fluoroscopic guidance. The dilator and wire were subsequently removed. The catheter was then threaded through the sheath into the vena cava and directed to the cavo-atrial junction. Sheath was split and removed. Final placement was confirmed using fluoroscopy. The catheter was then aspirated and flushed through all 3 lumens. Good return of venous blood was noted, and flushed easily. Wounds were inspected. Good hemostasis was noted. Following this, the access site wound was then closed using 4-0 monocryl. The catheter was secured to the skin using 3-0 prolene x 2. The capsule was cauterized. The cavity was thoroughly irrigated with saline. The wound was closed in 2 layers, with interrupted 3-0 vicryl for deeper tissues and 4-0 Monocryl running subcuticular for the skin. Dermabond was used for sterile dressing. Sterile dressing including BioPatch and tegaderm was applied for the catheter. Patient tolerated the procedure well without complication. Patient was woken up and brought to the PACU in stable condition. All counts were correct at the end of the procedure. No complications.

## 2018-09-21 PROBLEM — Z94.81 H/O ALLOGENEIC BONE MARROW TRANSPLANT (HCC): Status: ACTIVE | Noted: 2018-01-01

## 2018-09-21 NOTE — PROGRESS NOTES
Occupational Therapy   Occupational Therapy Initial Assessment, Treatment and D/c Note   Date: 2018   Patient Name: Lindsey Hanna  MRN: [id number]     : 1945    Date of Service: 2018    Discharge Recommendations:  Lindsey Hanna scored a 24/24 on the AM-PAC ADL Inpatient form. At this time, no further OT is recommended upon discharge. Recommend patient returns to prior setting with prior services. OT Equipment Recommendations  Equipment Needed: No      Patient Diagnosis(es): The encounter diagnosis was Post-op pain. has a past medical history of Arthritis; Cancer ([facility] [address] Dental crowns present; Enlarged prostate; Hemochromatosis; History of blood transfusion; Hx of blood clots; Hypertension; and Shoulder pain, right.   has a past surgical history that includes Tonsillectomy; back surgery; Cholecystectomy; Carpal tunnel release (Right); Knee arthroscopy (Right); Knee arthroscopy (Left); Foot surgery (Right); Dental surgery; Knee arthroscopy (Left, 14); Prostate surgery; pr insj tunneled cvc w/o subq port/ age 11 yr/> (Left, 2018); and pr rmvl michelle ctr vad w/subq port/ ctr/prph insj (Left, 2018). Restrictions  Position Activity Restriction  Other position/activity restrictions: up as tolerated    Subjective   General  Chart Reviewed: Yes  Additional Pertinent Hx: Admit  to [facility] with AML for MUD -allo BMT on  - Triple Lumen cath placed   receiving chemo for AML;    CXR - neg;                 PMHx - arthritis, AML, hemochromatosis, hx of blood clots, HTN, R shoulder pain, back surgery, R carpal tunnel release, R foot triple arthrodesis, R TKA x2, L TKA, port removal (18)  Family / Caregiver Present: No  Diagnosis: AML plan/ prep for  MUD Allo BMT on   Subjective  Subjective: \" I feel great\" pt in chair agreeable for OOB/OT eval and tx.    Pain Assessment  Patient Currently in Pain: Denies    Social/Functional History  Social/Functional

## 2018-09-21 NOTE — PROGRESS NOTES
consciousness. · Respiratory: No cough or wheezing, no sputum production. No hemoptysis. · Gastrointestinal: No abdominal pain, appetite loss, blood in stools. No change in bowel habits. · Genitourinary: No dysuria, trouble voiding, or hematuria. · Musculoskeletal:  + right shoulder pain and right sided chest pain  · Integumentary: No rash or pruritis. · Neurological: No headache, diplopia. No change in gait, balance, or coordination. No paresthesias. · Endocrine: No temperature intolerance. No excessive thirst, fluid intake, or urination. · Hematologic/Lymphatic: No abnormal bruising or ecchymoses, blood clots or swollen lymph nodes. · Allergic/Immunologic: No nasal congestion or hives. Physical Exam:    I&O:    Intake/Output Summary (Last 24 hours) at 09/21/18 0623  Last data filed at 09/21/18 0074   Gross per 24 hour   Intake             5454 ml   Output             3601 ml   Net             1853 ml       Vital Signs:  /64   Pulse 81   Temp 98.1 °F (36.7 °C) (Oral)   Resp 16   Ht 5' 9\" (1.753 m)   Wt 255 lb 12.8 oz (116 kg)   SpO2 98%   BMI 37.78 kg/m²     Weight:    Wt Readings from Last 3 Encounters:   09/20/18 255 lb 12.8 oz (116 kg)   08/14/18 266 lb 12.1 oz (121 kg)   10/09/14 294 lb (133.4 kg)       General: Awake, alert and oriented.   HEENT: normocephalic, PERRL, no scleral erythema or icterus, Oral mucosa moist and intact, throat clear  NECK: supple without palpable adenopathy  BACK: Straight, negative CVAT  SKIN: warm dry and intact without lesions rashes or masses  CHEST: CTA bilaterally without use of accessory muscles  CV: Normal S1 S2, RRR, no MRG  ABD: NT, ND, normoactive BS, no palpable masses or hepatosplenomegaly  M/S:  Right shoulder pain to palpation, no erythema   EXTREMITIES: trace BLE edema, denies calf tenderness  NEURO: CN II - XII grossly intact  CATHETER: Left TLH (Barrat, 9/20/18) - no erythema     Data:   CBC: Recent Labs      09/20/18   1200  09/21/18

## 2018-09-21 NOTE — PROGRESS NOTES
Chemotherapy drug Fludarabine independently verified with Carroll Davenport RN prior to administration. Acknowledgement of informed consent for chemotherapy administration verified. Original order, appropriateness of regimen, drug supplied, height, weight, BSA, dose calculations, expiration dates/times, drug appearance, and two patient identifiers were verified by both RNs. Drug checked for vesicant/irritant status and for risk of hypersensitivity. Most recent laboratory values and allergies, were reviewed. Positive, brisk blood return via CVC was confirmed prior to administration. Chest x-ray for correct line placement reviewed. Zulma Calvo and Carroll Davenport RN verified correct rate of chemotherapy and maintenance IV fluids. Patient was educated on chemotherapy regimen prior to administration including indication for treatment related to disease & side effects of chemotherapy drug. Patient verbalizes understanding of all instructions. Monitoring during infusion done per policy, see Flowsheets. Blood return verified before, during, and after infusion per policy; no signs of extravasation. Pt tolerated chemotherapy well and without incident. Chemotherapy infusion end time on the [facility]. Will continue to monitor.

## 2018-09-21 NOTE — PROGRESS NOTES
Making: Low Complexity  Patient Education: role of PT, use of call light, d/c planning; pt demonstrates understanding. REQUIRES PT FOLLOW UP: No         Plan   Plan  Times per week: d/c acute PT  Safety Devices  Type of devices: Call light within reach, Nurse notified, Chair alarm in place, Gait belt, Left in chair (RN in room with pt)    G-Code  PT G-Codes  Functional Limitation: Mobility: Walking and moving around  Mobility: Walking and Moving Around Current Status (): At least 20 percent but less than 40 percent impaired, limited or restricted  Mobility: Walking and Moving Around Goal Status (): At least 20 percent but less than 40 percent impaired, limited or restricted  Mobility: Walking and Moving Around Discharge Status ():  At least 20 percent but less than 40 percent impaired, limited or restricted  OutComes Score                                           AM-PAC Score  AM-PAC Inpatient Mobility Raw Score : 20  AM-PAC Inpatient T-Scale Score : 47.67  Mobility Inpatient CMS 0-100% Score: 35.83  Mobility Inpatient CMS G-Code Modifier : CJ          Goals          Therapy Time   Individual Concurrent Group Co-treatment   Time In 0934         Time Out 1012         Minutes 38               Timed Code Treatment Minutes:  23    Total Treatment Minutes:  [address], [place]  [id number]

## 2018-09-21 NOTE — PROGRESS NOTES
Pt IV fluids not paused during lab draw. Glucose higher than anticipated. Lab notified. CBC and BMP redrawn. Please see [phone] for accurate results.

## 2018-09-22 NOTE — PLAN OF CARE
Infection:  Goal: Will show no infection signs and symptoms  Will show no infection signs and symptoms   Outcome: Ongoing  Pt afebrile. TLH in place; site and dressing remain c/d/i. Lines flush well with good blood return; Tegaderm and Biopatch in place. Lines pinned per protocol. Will continue to monitor. CVC site remains free of signs/symptoms of infection. No drainage, edema, erythema, pain, or warmth noted at site. Dressing changes continue per protocol and on an as needed basis - see flowsheet. Compliant with [facility] Bath Protocol:  Performed CHG bath today per [facility] protocol utilizing CHG solution in the shower. CVC site cleansed with CHG wipe over dressing, skin surrounding dressing, and first 6\" of IV tubing. Pt tolerated well. Continued to encourage daily CHG bathing per [facility] protocol. Problem: Discharge Planning:  Goal: Discharged to appropriate level of care  Discharged to appropriate level of care   Outcome: Ongoing  Pt and wife updated on and agreeable to plan of care. Problem: Venous Thromboembolism:  Goal: Will show no signs or symptoms of venous thromboembolism  Will show no signs or symptoms of venous thromboembolism   Outcome: Ongoing  No s/s venous thromboembolism. Pt educated on s/s and instructed to notify RN immediately if s/s noted. Verbalized understanding. Will monitor. Problem: Activity:  Goal: Ability to tolerate increased activity will improve  Ability to tolerate increased activity will improve   Outcome: Ongoing  Stable/No Isolation Precautions:  Pt with activity orders for up ad miguel. Encouraged pt to be up OOB as much as possible throughout the day and for all meals. Encouraged frequent short naps as necessary to preserve energy but instructed that while awake, pt should be OOB. Encouraged pt to ambulate in halls. Pt seen up ad miguel in halls several times this shift. Pt is visualized to be OOB % of the time this shift.   Will continue to encourage frequent

## 2018-09-23 NOTE — PROGRESS NOTES
BCC Allogeneic Progress Note    2018    Garrett Mireles    :  1945    MRN:  [id number]    Referring MD: Elzbieta Almendarez MD, PhD    Subjective:  Doing well this morning and states he slept well. His pain is well controlled w/ Oxycodone and Baclofen. C/o dry mouth. KPS:  80%     Isolation:  None     Medications    Scheduled Meds:   enoxaparin  40 mg Subcutaneous Daily    [START ON 2018] fluconazole  400 mg Oral Daily    Saline Mouthwash  15 mL Swish & Spit 4x Daily AC & HS    sodium chloride flush  10 mL Intravenous 2 times per day    valACYclovir  500 mg Oral BID    amLODIPine  5 mg Oral Daily    baclofen  10 mg Oral TID    propranolol  80 mg Oral Daily    ursodiol  250 mg Oral 4x Daily AC & HS    furosemide  40 mg Intravenous Q12H    ondansetron  12 mg Oral Q24H    fludarabine (FLUDARA) chemo IVPB  55 mg Intravenous Q24H    [START ON 2018] tacrolimus  2.5 mg Oral Q12H    [START ON 2018] methotrexate sodium  30 mg Intravenous Once    [START ON 2018] methotrexate sodium  20 mg Intravenous Once    [START ON 10/3/2018] methotrexate sodium  20 mg Intravenous Once    [START ON 10/8/2018] methotrexate sodium  20 mg Intravenous Once     Continuous Infusions:   sodium chloride 100 mL/hr at 18 2302    sodium chloride 250 mL (18 1517)     PRN Meds:bisacodyl, zolpidem, sodium chloride, alteplase, magnesium hydroxide, magnesium sulfate, potassium chloride, Saline Mouthwash, acetaminophen, prochlorperazine **OR** prochlorperazine, LORazepam **OR** LORazepam, oxyCODONE    ROS:  · Constitutional: Denies fever, sweats, weight loss. · Eyes: No visual changes or diplopia. No scleral icterus. · ENT: No Headaches, hearing loss or vertigo. No mouth sores or sore throat. · Cardiovascular: No chest pain, dyspnea on exertion, palpitations or loss of consciousness. · Respiratory: No cough or wheezing, no sputum production. No hemoptysis. 30.9*  30.0*   MCV  97.6  98.3  97.9   PLT  321  361  283     BMP/Mag:  Recent Labs      09/20/18   1200  09/21/18   0315  09/21/18   0440  09/22/18   0315  09/23/18   0400   NA  137  130*  135*  138  135*   K  4.1  5.1  5.0  4.3  4.0   CL  98*  97*  100  104  100   CO2  27  23  25  23  24   PHOS  3.7  3.0   --    --    --    BUN  17  15  16  23*  20   CREATININE  0.5*  0.7*  0.7*  0.6*  0.7*   MG  1.90   --    --    --    --      LIVP:   Recent Labs      09/21/18   0315  09/22/18   0315  09/23/18   0400   AST  25  17  13*   ALT  27  26  21   BILIDIR  <0.2  <0.2  <0.2   BILITOT  0.4  0.3  0.6   ALKPHOS  89  76  68     Uric Acid:    Recent Labs      09/21/18 0315   LABURIC  4.5     Coags:   Recent Labs      09/20/18   1200   PROTIME  15.4*   INR  1.35*   APTT  34.3     Tacro:  No results for input(s): TACROLEV in the last 72 hours. PROBLEM LIST:         1. 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 Hereditary hemochromatosis - needs post BMT phlebotomy   3. LLE DVT (11/2017)  4. Right sided chest pain  5. HTN  6. Gout      TREATMENT:        1. Vidaza x 1 cycle (10/30/17)   2. Vyxeos (12/30/2017  3. Intermediate Dose Anna-C x 3 cycles (2/2018 - 4/2018)  4. MUD Allo-pbpc BMT ([facility]e protocol)     ASSESSMENT AND PLAN:        1. AML:  Currently in CR  - Admitted for MUD Allo-pbpc BMT ([facility]e protocol)      Preparative Regimen: Melphalan and Fludarabine  Date of BMT:  9/27/18  Source of stem cells: PBPC  Donor/Recipient Blood Type:  B Positive / B Positive  Donor Sex:  Male / NMDP (DID# [id number])   CMV Donor / Recipient: Negative / Negative      Day - 5     2. ID:  No evidence of infection.    - Cont Valtrex prophylaxis    - Start Diflucan prophylaxis on 9/24/18  - Start Levaquin when [address] < 1.5     3.   Heme:  Anemia from previous chemotherapy    - H/o hereditary hemochromatosis - needs post BMT phlebotomy   - Transfuse for Hgb < 7 and Platelets < 61O.    - No transfusion today.     4.  Metabolic: Electrolytes are WNL and renal fxn stable except for mild hypoNa and steroid induced hyperglycemia     - Change/decrease IVF:  NS @ 100 mL/hr   - Replace potassium and magnesium per policy.     5.  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 VOD:  No evidence of VOD. Recent Labs      09/23/18   0400   BILIDIR  <0.2   BILITOT  0.6     Admission Weight: 265 lb (120.2 kg)  Current Weight: Weight: 257 lb 12.8 oz (116.9 kg). - Cont Actigall.     7. Pulmonary:  No active issues  - PFTs:  decreased FEV1  - Encourage IS and ambulation      8. Nutrition:  Appetite is good at this point.  - Start low microbial diet  - Dietary to follow     9. Right Sided Chest Pain:  Etiology unclear, but appears to be musculoskeletal and also having right shoulder pain   - CTA Chest (9/12/18) - No acute findings   - Cont baclofen TID, Oxycodone and Tylenol as needed  - Cont his exercises and try to work out the pain. - Consult PT/OT  - Encouraged to be out of bed as much as possible and to use bike in room      10. HTN:  Stable  - Cont Norvasc 5 mg daily and Propanolol 80 mg daily   - Hold prn Lasix and give IV if needed      11.   Left Great Toe Nail Removal:  No acute issues   - Consult Dr. Blum to follow up     - DVT Prophylaxis: Platelets >60,623 cells/dL, - daily lovenox prophylaxis ordered  Contraindications to pharmacologic prophylaxis: None  Contraindications to mechanical prophylaxis: None    - Disposition:  Home once blood counts recover and toxicities resolved following allogeneic transplant    Patsy Cortez MD   Please contact me through [facility]

## 2018-09-23 NOTE — PLAN OF CARE
Infection:  Goal: Will show no infection signs and symptoms  Will show no infection signs and symptoms   Outcome: Ongoing  Pt afebrile. TLH in place; site and dressing remain c/d/i. Lines flush well with good blood return; Tegaderm and Biopatch in place. Lines pinned per protocol. Will continue to monitor. CVC site remains free of signs/symptoms of infection. No drainage, edema, erythema, pain, or warmth noted at site. Dressing changes continue per protocol and on an as needed basis - see flowsheet.      Compliant with [facility] Bath Protocol:  Performed CHG bath today per [facility] protocol utilizing CHG solution in the shower. CVC site cleansed with CHG wipe over dressing, skin surrounding dressing, and first 6\" of IV tubing. Pt tolerated well. Continued to encourage daily CHG bathing per [facility] protocol.     Problem: Discharge Planning:  Goal: Discharged to appropriate level of care  Discharged to appropriate level of care   Outcome: Ongoing  Pt and wife updated on and agreeable to plan of care.      Problem: Venous Thromboembolism:  Goal: Will show no signs or symptoms of venous thromboembolism  Will show no signs or symptoms of venous thromboembolism   Outcome: Ongoing  No s/s venous thromboembolism. Pt educated on s/s and instructed to notify RN immediately if s/s noted. Verbalized understanding. Will monitor.        Problem: Activity:  Goal: Ability to tolerate increased activity will improve  Ability to tolerate increased activity will improve   Outcome: Ongoing  Stable/No Isolation Precautions:  Pt with activity orders for up ad miguel. Encouraged pt to be up OOB as much as possible throughout the day and for all meals. Encouraged frequent short naps as necessary to preserve energy but instructed that while awake, pt should be OOB. Encouraged pt to ambulate in halls. Pt seen up ad miguel in halls once this shift. Pt is visualized to be OOB % of the time this shift.   Will continue to encourage frequent

## 2018-09-24 NOTE — PROGRESS NOTES
Vital Signs:  BP (!) 144/70   Pulse 92   Temp 98.3 °F (36.8 °C) (Oral)   Resp 14   Ht 5' 9\" (1.753 m)   Wt 257 lb 12.8 oz (116.9 kg)   SpO2 96%   BMI 38.07 kg/m²     Weight:    Wt Readings from Last 3 Encounters:   09/23/18 257 lb 12.8 oz (116.9 kg)   08/14/18 266 lb 12.1 oz (121 kg)   10/09/14 294 lb (133.4 kg)       General: Awake, alert and oriented. HEENT: normocephalic, PERRL, no scleral erythema or icterus, Oral mucosa dry and intact, throat clear  NECK: supple without palpable adenopathy  BACK: Straight  SKIN: warm dry and intact without lesions rashes or masses  CHEST: CTA bilaterally without use of accessory muscles  CV: Normal S1 S2, RRR, no MRG  ABD: NT, ND, normoactive BS, no palpable masses or hepatosplenomegaly  M/S:  Right shoulder pain to palpation, no erythema   EXTREMITIES: trace BLE edema, denies calf tenderness  NEURO: CN II - XII grossly intact  CATHETER: Left TLH (Barr, 9/20/18) - no erythema     Data:   CBC:   Recent Labs      09/22/18 0315 09/23/18   0400  09/24/18   0340   WBC  9.6  4.8  3.0*   HGB  10.7*  10.3*  10.3*   HCT  30.9*  30.0*  29.2*   MCV  98.3  97.9  97.1   PLT  361  283  234     BMP/Mag:  Recent Labs      09/22/18 0315 09/23/18   0400 09/24/18   0340  09/24/18   0525   NA  138  135*   --   137   K  4.3  4.0   --   3.5   CL  104  100   --   102   CO2  23  24   --   25   PHOS   --    --    --   3.5   BUN  23*  20   --   15   CREATININE  0.6*  0.7*   --   0.6*   MG   --    --   2.00   --      LIVP:   Recent Labs      09/22/18 0315 09/23/18   0400  09/24/18   0525   AST  17  13*  13*   ALT  26  21  16   BILIDIR  <0.2  <0.2  <0.2   BILITOT  0.3  0.6  0.5   ALKPHOS  76  68  64     Uric Acid:    Recent Labs      09/24/18   0525   LABURIC  7.3*     Coags:   Recent Labs      09/24/18   0340   PROTIME  15.0*   INR  1.32*   APTT  31.4     Tacro:  No results for input(s): TACROLEV in the last 72 hours. PROBLEM LIST:         1.   MDS / CMML-2 transformed to follow  Nausea: Due to chemo  - Cont Compazine and Ativan as needed      9. Right Sided Chest Pain:  Etiology unclear, but appears to be musculoskeletal and also having right shoulder pain   - CTA Chest (9/12/18) - No acute findings   - Cont baclofen TID, Oxycodone and Tylenol as needed  - Cont his exercises and try to work out the pain. - Cont PT/OT  - Encouraged to be out of bed as much as possible and to use bike in room; he is doing this       10. HTN:  Stable  - Cont Norvasc 5 mg daily and Propanolol 80 mg daily   - Hold home Lasix and give IV if needed      11.   Left Great Toe Nail Removal:  No acute issues   - Dr. Stephany Uribe to follow as needed     - DVT Prophylaxis: Platelets >07,848 cells/dL, - daily lovenox prophylaxis ordered  Contraindications to pharmacologic prophylaxis: None  Contraindications to mechanical prophylaxis: None    - Disposition:  Home once blood counts recover and toxicities resolved following allogeneic transplant    KEL Estes - CNP   Please contact me through Koby Viera MD  [facility]  Please contact me through [address] [place] [address]

## 2018-09-24 NOTE — PLAN OF CARE
chlorhexidine and linens changed daily per protocol. Pt verbalizes understanding of low microbial diet. Will continue to monitor. Problem: Infection - Central Venous Catheter-Associated Bloodstream Infection:  Goal: Will show no infection signs and symptoms  Will show no infection signs and symptoms   Outcome: Ongoing  CVC site remains free of signs/symptoms of infection. No drainage, edema, erythema, pain, or warmth noted at site. Dressing changes continue per protocol and on an as needed basis - see flowsheet. Compliant with [facility] Bath Protocol:  Performed CHG bath today per [facility] protocol utilizing CHG solution in the shower. CVC site cleansed with CHG wipe over dressing, skin surrounding dressing, and first 6\" of IV tubing. Pt tolerated well. Continued to encourage daily CHG bathing per [facility] protocol. Problem: Discharge Planning:  Goal: Discharged to appropriate level of care  Discharged to appropriate level of care   Outcome: Ongoing  Pt verbalizes understanding of discharge plan at this time. Will continue to monitor and provide support. Problem: Venous Thromboembolism:  Goal: Will show no signs or symptoms of venous thromboembolism  Will show no signs or symptoms of venous thromboembolism   Outcome: Ongoing  Adherent with DVT Prevention: Pt is at risk for DVT d/t decreased mobility and cancer treatment. Pt educated on importance of activity. Pt has orders for Subcut prophylactic lovenox. Pt verbalizes understanding of need for prophylaxis while inpatient. Problem: Activity:  Intervention: Encourage scheduling of activities to allow for periods of rest  Patient educated to rest after activity. Goal: Ability to tolerate increased activity will improve  Ability to tolerate increased activity will improve   Outcome: Ongoing  Stable/No Isolation Precautions:  Pt with activity orders for up ad miguel. Encouraged pt to be up OOB as much as possible throughout the day and for all meals.

## 2018-09-25 NOTE — PLAN OF CARE
Problem: Falls - Risk of:  Goal: Will remain free from falls  Will remain free from falls   Outcome: Ongoing  Orthostatic vital signs obtained at start of shift - see flowsheet for details. Pt does not meet criteria for orthostasis. Pt is a High fall risk. See Serge Servando Fall Score and ABCDS Injury Risk assessments. Pt bed is in low position, side rails up, call light and belongings are in reach. Fall risk light is on outside pts room. Pt encouraged to call for assistance as needed. Will continue with hourly rounds for PO intake, pain needs, toileting and repositioning as needed. Problem: Pain:  Goal: Pain level will decrease  Pain level will decrease   Outcome: Met This Shift  Pt has not complained of any pain this shift, will continue to monitor. Problem: Bleeding:  Goal: Will show no signs and symptoms of excessive bleeding  Will show no signs and symptoms of excessive bleeding   Outcome: Ongoing  Patient's hemoglobin this AM:   Recent Labs      09/25/18   0310   HGB  9.6*     Patient's platelet count this AM:   Recent Labs      09/25/18   0310   PLT  203    Thrombocytopenia not present at this time. Patient showing no signs or symptoms of active bleeding. Transfusion not indicated at this time. Patient verbalizes understanding of all instructions. Will continue to assess and implement POC. Call light within reach and hourly rounding in place. Problem: PROTECTIVE PRECAUTIONS  Goal: Patient will remain free of nosocomial Infections  Outcome: Ongoing  Pt remains in neutropenic precautions per floor policy, although pt is not neutropenic at this time. Pt, visitors, and staff noted to be following precautions appropriately. Handwashing in place; pt wearing mask in hallway per protocol. Pt in private room. Low microbial diet in place. Will continue to monitor.     Problem: Infection - Central Venous Catheter-Associated Bloodstream Infection:  Goal: Will show no infection signs and symptoms  Will show no

## 2018-09-26 NOTE — PROGRESS NOTES
BCC Allogeneic Progress Note    2018    Severa Fothergill    :  1945    MRN:  [id number]    Referring MD: Annabella Dobbins MD, PhD    Subjective:  C/O epigastric pain. Decreased appetite and getting slowly weaker.     KPS:  80%     Isolation:  None     Medications    Scheduled Meds:   enoxaparin  40 mg Subcutaneous Daily    fluconazole  400 mg Oral Daily    Saline Mouthwash  15 mL Swish & Spit 4x Daily AC & HS    sodium chloride flush  10 mL Intravenous 2 times per day    valACYclovir  500 mg Oral BID    amLODIPine  5 mg Oral Daily    baclofen  10 mg Oral TID    propranolol  80 mg Oral Daily    ursodiol  250 mg Oral 4x Daily AC & HS    furosemide  40 mg Intravenous Q12H    tacrolimus  2.5 mg Oral Q12H    [START ON 2018] methotrexate sodium  30 mg Intravenous Once    [START ON 2018] methotrexate sodium  20 mg Intravenous Once    [START ON 10/3/2018] methotrexate sodium  20 mg Intravenous Once    [START ON 10/8/2018] methotrexate sodium  20 mg Intravenous Once     Continuous Infusions:   IV infusion builder 50 mL/hr at 18 1714    sodium chloride 250 mL (18 1517)     PRN Meds:ondansetron, ondansetron, bisacodyl, zolpidem, sodium chloride, alteplase, magnesium hydroxide, magnesium sulfate, potassium chloride, Saline Mouthwash, acetaminophen, prochlorperazine **OR** prochlorperazine, LORazepam **OR** LORazepam, oxyCODONE    ROS:  See above, remainder of 10 point ROS is negative     Physical Exam:    I&O:      Intake/Output Summary (Last 24 hours) at 18 0644  Last data filed at 18 0639   Gross per 24 hour   Intake             3510 ml   Output             1900 ml   Net             1610 ml       Vital Signs:  BP (!) 127/57   Pulse 83   Temp 98.3 °F (36.8 °C) (Oral)   Resp 16   Ht 5' 9\" (1.753 m)   Wt 258 lb 6.4 oz (117.2 kg)   SpO2 97%   BMI 38.16 kg/m²     Weight:    Wt Readings from Last 3 Encounters:   18 258 lb 6.4 oz (117.2

## 2018-09-27 NOTE — PROGRESS NOTES
Nutrition Assessment    Type and Reason for Visit: Initial    Nutrition Recommendations:   1. PO Diet: Continue current diet   2. ONS: Encourage and offer home protein supplements BID  3. Monitor weight, labs and clinical progress  4. Monitor, record and encourage PO intake at all meals through admission. 5. Consume small/frequent meals and snacks to build appetite  6. Avoid high fat/fried foods and foods with strong odors to help control nausea      Malnutrition Assessment:  · Malnutrition Status: No malnutrition  · Context: Acute illness or injury  · Findings of the 6 clinical characteristics of malnutrition (Minimum of 2 out of 6 clinical characteristics is required to make the diagnosis of moderate or severe Protein Calorie Malnutrition based on AND/ASPEN Guidelines):  1. Energy Intake-Less than or equal to 75%, greater than or equal to 5 days    2. Weight Loss-Unable to assess (weight changes r/t fluid balance),    3. Fat Loss-No significant subcutaneous fat loss,    4. Muscle Loss-No significant muscle mass loss,    5. Fluid Accumulation-No significant fluid accumulation,    6.  Strength-Not measured    Nutrition Diagnosis:   · Problem: Increased nutrient needs  · Etiology: related to Catabolic illness     Signs and symptoms:  as evidenced by Other (chemo and s/p BMT)    Nutrition Assessment:  · Subjective Assessment: RD following for LOS. Pt adm with AML. Pt is s/p allo BMT today. Pt reports he is feeling ok. Pt complains of indigestion noted. Pt states nausea this morning. Pt denies vomiting/constipation. Pt reports watery BM's. Pt states he has not been eating much (cereal, milk, fruit) and has decreased appetite. Pt with variable po intake per I/O. Pt has been drinking Premier Protein shakes from home (1 container contains 160 kcal and 30 grams of protein). Pt states he was drinking 3/day and now cut down to 1-2 per day. Snacks encouraged. Pt reports his appetite was pretty good at home.  STEVEN

## 2018-09-27 NOTE — PLAN OF CARE
or flowers allowed. Also reinforced importance of hand hygiene. Pt following a low microbial diet. Problem: Infection - Central Venous Catheter-Associated Bloodstream Infection:  Goal: Will show no infection signs and symptoms  Will show no infection signs and symptoms   Outcome: Ongoing  CVC site remains free of signs/symptoms of infection. No drainage, edema, erythema, pain, or warmth noted at site. Dressing changes continue per protocol and on an as needed basis - see flowsheet. Compliant with [facility]:  Performed CHG bath today per [facility] protocol utilizing CHG solution in the shower. CVC site cleansed with CHG wipe over dressing, skin surrounding dressing, and first 6\" of IV tubing. Pt tolerated well. Continued to encourage daily CHG bathing per [facility] protocol. Problem: Discharge Planning:  Goal: Discharged to appropriate level of care  Discharged to appropriate level of care   Outcome: Ongoing  Pt D0 allo. Will be d/c once counts have recovered and medically stable. Pt aware of POC at this time. Will monitor. Problem: Venous Thromboembolism:  Goal: Will show no signs or symptoms of venous thromboembolism  Will show no signs or symptoms of venous thromboembolism   Outcome: Ongoing  Adherent with DVT Prevention: Pt is at risk for DVT d/t decreased mobility and cancer treatment. Pt educated on importance of activity. Pt has orders for Subcut prophylactic lovenox. Pt verbalizes understanding of need for prophylaxis while inpatient. Problem: Activity:  Goal: Ability to tolerate increased activity will improve  Ability to tolerate increased activity will improve   Outcome: Ongoing  Pt OOB majority of day. Walked a couple times in hallway. Will cont to encourage.

## 2018-09-27 NOTE — PROGRESS NOTES
Physical Therapy    Facility/Department: [address]  Initial Assessment    NAME: Mojgan Morales  : 1945  MRN: [id number]    Date of Service: 2018    Discharge Recommendations:  Mojgan Morales scored a 22/24 on the AM-PAC short mobility form. Current research shows that an AM-PAC score of 18 or greater is typically associated with a discharge to the patient's home setting. Based on the patients AM-PAC score and their current functional mobility deficits, it is recommended that the patient have 2-3 sessions per week of Physical Therapy at d/c to increase the patients independence. PT Equipment Recommendations  Equipment Needed: No    Patient Diagnosis(es): The encounter diagnosis was Post-op pain. has a past medical history of Arthritis; Cancer ([address] Dental crowns present; Enlarged prostate; Hemochromatosis; History of blood transfusion; Hx of blood clots; Hypertension; and Shoulder pain, right.   has a past surgical history that includes Tonsillectomy; back surgery; Cholecystectomy; Carpal tunnel release (Right); Knee arthroscopy (Right); Knee arthroscopy (Left); Foot surgery (Right); Dental surgery; Knee arthroscopy (Left, 14); Prostate surgery; pr insj tunneled cvc w/o subq port/ age 11 yr/> (Left, 2018); and pr rmvl michelle ctr vad w/subq port/ ctr/prph insj (Left, 2018). Restrictions  Position Activity Restriction  Other position/activity restrictions: up as tolerated  Vision/Hearing  Vision: Within Functional Limits  Vision Exceptions: Wears glasses at all times  Hearing: Exceptions to [facility]  Hearing Exceptions: Bilateral hearing aid     Subjective  General  Chart Reviewed: Yes  Additional Pertinent Hx: Pt adm  for port removal and placement of triple lumen for treatment of AML; plan for BMT ;  PMHx: AML, arthritis, enlarged prpostate, blood clots, HTN, knee scopes  Referring Practitioner: Dionicio Abraham NP  Diagnosis: Pt adm  for port removal

## 2018-09-27 NOTE — PLAN OF CARE
Problem: Musculor/Skeletal Functional Status  Intervention: PT Evaluation/treatment  Increase patient to functional baseline.

## 2018-09-28 NOTE — PROGRESS NOTES
40 percent impaired, limited or restricted  Self Care Goal Status (): At least 1 percent but less than 20 percent impaired, limited or restricted            AM-PAC Score        AM-PAC Inpatient Daily Activity Raw Score: 21  AM-PAC Inpatient ADL T-Scale Score : 44.27  ADL Inpatient CMS 0-100% Score: 32.79  ADL Inpatient CMS G-Code Modifier : CJ    Goals  Short term goals  Time Frame for Short term goals: by dc  Short term goal 1: Pt will complete LB dressing with min A  Short term goal 2: Pt will complete standing level ADL x 10 mins within VSS  Short term goal 3: Pt will participate in B UE HEP x 15 reps for increased activity tolerance  Short term goal 4: Pt will demonstrate mod I for UE HEP to reduce pain  Patient Goals   Patient goals : to get my shoulder feeling better; to not be as tired after showering       Therapy Time   Individual Concurrent Group Co-treatment   Time In 1500         Time Out 1518         Minutes 18         Timed Code Treatment Minutes: 3 Minutes (+15 eval)     If patient discharges prior to next treatment, this note will serve as discharge summary. Continue per plan of care if patient does not discharge. Care of this patient is transferred to the Director of Therapy operations for reassignment, as patient is in continued need for Occupational Therapy supervision and treatment. Deyanira Serra.  [address], OTR/L [id number]

## 2018-09-29 NOTE — PLAN OF CARE
Problem: Falls - Risk of:  Goal: Will remain free from falls  Will remain free from falls   Outcome: Ongoing  Patient remained free of falls during shift. Patient is a [place] Fall Risk: Medium (25-44); uses call light appropriately, ambulates with a steady gait and no assistive devices. Orthostatic blood pressures assessed and patient remains negative. Will continue to encourage ambulation and implement POC. Call light within reach and hourly rounding in place. Problem: Pain:  Goal: Pain level will decrease  Pain level will decrease   Outcome: Met This Shift  Patient has no complaints of pain during shift. Will continue to assess comfort and pain on 0-10 number scale and medicate per order while encouraging non pharmacological techniques as well. Call light within reach and hourly rounding in place. Problem: Bleeding:  Goal: Will show no signs and symptoms of excessive bleeding  Will show no signs and symptoms of excessive bleeding   Outcome: Ongoing  Patient's hemoglobin this AM:   Recent Labs      18   0400   HGB  9.8*     Patient's platelet count this AM:   Recent Labs      18   0400   PLT  94*    Thrombocytopenia Precautions in place. Patient showing no signs or symptoms of active bleeding. Transfusion not indicated at this time. Patient verbalizes understanding of all instructions. Will continue to assess and implement POC. Call light within reach and hourly rounding in place. Problem: PROTECTIVE PRECAUTIONS  Goal: Patient will remain free of nosocomial Infections  Outcome: Ongoing  Patient is showing no signs or symptoms of nosocomial infections. Patient's temp during shift are as follows: Temp (8hrs), Av °F (36.7 °C), Min:97.9 °F (36.6 °C), Max:98 °F (36.7 °C)  . Patient placed in private room and instructed on importance of handwashing and when to wear a mask when ambulating in hallway. Will continue to assess for s/s of infection and implement POC.  Call light within reach and

## 2018-09-29 NOTE — PROGRESS NOTES
Clinical Pharmacy Progress Note    Patient Name: Telly Vo  YOB: 1945  Diagnosis: AML    GVHD Prophylaxis:  Tacrolimus (Prograf) starting Day -2 per FATE study  · Adjusted according to levels - drawn via red lumen  Methotrexate 30 mg IVP days +1, followed by Methotrexate 20 mg IVP +3, +6, +11 =>        (per FATE study: MTX 15mg/m2 IVP d+1 and MTX 10mg/m2 IVP on +3, +6, +11)     Tacrolimus (Prograf) levels starting day +1  Tacrolimus (Prograf) goal level:  5-15 ng/mL per FATE study    Date SCr Bili Prograf Dose Prograf Level Adjustments / Comments   9/20/18 0.5 0.9 - - Patient admitted for ALLO Lite MUD- Melphlan/Fludara. Will start oral Prograf 2.5 mg po bid on 9/25/18 with 1st level on 9/28/18 and MWF thereafter   9/28/18; day+1 <0.5 0.3 2.5mg po bid 18.3 Discussed with Dr. Kayla Stone- Level supratherapeutic; will hold tonight's and Sat am's Prograf doses and obtain tacrolimus level on Sat 9/29 at 8:30 and then re-evaluate. 9/29; d+2 0.6 0.4 On hold 12.7 Resume tacrolimus this PM at 1.5 mg po bid with next level Mon 10/1. Please call with questions. Gabriela Childress, Pharm. D., BCOP  [address] [facility] Clinical Pharmacist  Wireless:  [phone]  9/29/2018 1:06 PM

## 2018-09-30 NOTE — PROGRESS NOTES
153/80   Pulse 93   Temp 97.7 °F (36.5 °C) (Oral)   Resp 18   Ht 5' 9\" (1.753 m)   Wt 261 lb 9.6 oz (118.7 kg)   SpO2 98%   BMI 38.63 kg/m²     Weight:    Wt Readings from Last 3 Encounters:   09/30/18 261 lb 9.6 oz (118.7 kg)   08/14/18 266 lb 12.1 oz (121 kg)   10/09/14 294 lb (133.4 kg)       General: Awake, alert and oriented. HEENT: normocephalic, PERRL, no scleral erythema or icterus, Oral mucosa intact, throat clear  NECK: supple without palpable adenopathy  BACK: Straight  SKIN: warm dry and intact without lesions rashes or masses  CHEST: CTA bilaterally without use of accessory muscles  CV: Normal S1 S2, RRR, no MRG  ABD: NT, ND, normoactive BS, no palpable masses or hepatosplenomegaly  M/S:  Right shoulder pain to palpation, no erythema   EXTREMITIES: No edema, denies calf tenderness  NEURO: CN II - XII grossly intact  CATHETER: Left TLH (Barrat, 9/20/18) - no erythema     Data:   CBC:   Recent Labs      09/28/18   0400  09/29/18   0305 09/30/18   0405   WBC  0.2*  0.1*  0.1*   HGB  9.8*  9.1*  8.4*   HCT  28.7*  25.8*  24.1*   MCV  97.1  95.8  95.8   PLT  94*  47*  22*     BMP/Mag:  Recent Labs      09/28/18   0400 09/29/18   0305 09/30/18   0405   NA  139  142  139   K  4.3  3.6  3.9   CL  108  111*  108   CO2  21  20*  22   PHOS  2.9   --    --    BUN  14  16  16   CREATININE  <0.5*  0.6*  0.6*   MG  1.60*  1.40*  1.80     LIVP:   Recent Labs      09/28/18   0400  09/29/18   0305  09/30/18   0405   AST  15  13*  85*   ALT  12  12  68*   BILIDIR  <0.2  <0.2  <0.2   BILITOT  0.3  0.4  0.5   ALKPHOS  61  61  64     Uric Acid:    Recent Labs      09/28/18   0400   LABURIC  4.0     Coags:   No results for input(s): PROTIME, INR, APTT in the last 72 hours. Tacro:    Recent Labs      09/28/18   0905  09/29/18   0845   TACROLEV  18.3  12.7       PROBLEM LIST:         1. 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 Hereditary hemochromatosis - needs post BMT phlebotomy   3. LLE DVT (11/2017)  4.   Right sided chest pain  5. HTN  6. Gout    Post - Transplant Complications:  1. Nausea / Indigestion  2. Insomnia       TREATMENT:        1. Vidaza x 1 cycle (10/30/17)   2. Vyxeos (12/30/2017  3. Intermediate Dose Anna-C x 3 cycles (2/2018 - 4/2018)  4. MUD Allo-pbpc BMT (ProTmune protocol)     ASSESSMENT AND PLAN:        1. AML:  Currently in CR  - Admitted for MUD Allo-pbpc BMT (ProTmune protocol)      Preparative Regimen: Melphalan and Fludarabine  Date of BMT:  9/27/18  Source of stem cells: PBPC / 6.08x10^6 [id number]/kg  Donor/Recipient Blood Type:  B Positive / B Positive  Donor Sex:  Male / NMDP (DID# [id number])   CMV Donor / Recipient: Negative / Negative      Day + 3     2. ID:  No evidence of infection.    - Cont Levaquin, Diflucan & Valtrex prophylaxis       3. Heme:  Anemia and neutropenia from previous chemotherapy    - H/o hereditary hemochromatosis - needs post BMT phlebotomy   - Transfuse for Hgb < 7 and Platelets < 15H.    - No transfusion today.     4.  Metabolic:  Electrolytes are WNL and renal fxn stable   - Cont IVF:  NS + KCl 10 meq @ 75 mL/hr   - Replace potassium and magnesium per policy.     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 VOD:  No evidence of VOD. Recent Labs      09/30/18   0405   BILIDIR  <0.2   BILITOT  0.5     Admission Weight: 265 lb (120.2 kg)  Current Weight: Weight: 261 lb 9.6 oz (118.7 kg).    - Cont Actigall.     7. Pulmonary:  No active issues  - PFTs:  decreased FEV1  - Encourage IS and ambulation      8.  GI / Nutrition:  C/o decreased appetite and indigestion, but still eating ok  - Cont low microbial diet  - Encourage small more frequent meals   - Cont Protein supplements if not eating   - Dietary to follow  Nausea: Due to chemo  - Cont Zofran, Compazine and Ativan as needed   - Cont Maalox and PPI for indigestion      9. Right Sided Chest Pain:  Etiology unclear, but appears to be musculoskeletal and also having right shoulder pain   - CTA Chest (9/12/18) - No acute findings   - Cont baclofen TID, Oxycodone and Tylenol as needed  - Cont his exercises and try to work out the pain. - Cont PT/OT  - Encouraged to be out of bed as much as possible and to use bike in room; he is doing this       10. HTN:  Slightly elevated BP  - Cont Norvasc 5 mg daily and Propanolol 80 mg daily   - Hold home Lasix and give IV if needed      11. Left [place] Toenail Removal:  No acute issues   - Dr. Rani Delong to follow as needed     12. Insomnia:  Chronic problem that has increased in last couple days  - Cont Ambien 5 mg prn and may repeat dose in 2 - 3 hours.      - DVT Prophylaxis: Platelets >39,346 cells/dL, - daily lovenox prophylaxis ordered  Contraindications to pharmacologic prophylaxis: None  Contraindications to mechanical prophylaxis: None    - Disposition:  Home once blood counts recover and toxicities resolved following allogeneic transplant      Carmen Camejo MD

## 2018-09-30 NOTE — PLAN OF CARE
Problem: Falls - Risk of:  Goal: Will remain free from falls  Will remain free from falls   Outcome: Ongoing  Orthostatic vital signs obtained at start of shift - see flowsheet for details. Pt does not meet criteria for orthostasis. Pt is a Low fall risk. See [place] Fall Score and ABCDS Injury Risk assessments. - Screening for Orthostasis AND not a [place] Risk per WRIGHT/ABCDS: Pt bed is in low position, side rails up, call light and belongings are in reach. Fall risk light is on outside pts room. Pt encouraged to call for assistance as needed. Will continue with hourly rounds for PO intake, pain needs, toileting and repositioning as needed. Problem: Pain:  Goal: Pain level will decrease  Pain level will decrease   Outcome: Ongoing  Pt c/o pain in lower abdomen, 5 out of 10, pain meds admin, no further complaints will monitor    Problem: Bleeding:  Goal: Will show no signs and symptoms of excessive bleeding  Will show no signs and symptoms of excessive bleeding   Outcome: Ongoing  Patient's hemoglobin this AM:   Recent Labs      09/30/18   0405   HGB  8.4*     Patient's platelet count this AM:   Recent Labs      09/30/18   0405   PLT  22*    Thrombocytopenia Precautions in place. Patient showing no signs or symptoms of active bleeding. Transfusion not indicated at this time. Patient verbalizes understanding of all instructions. Will continue to assess and implement POC. Call light within reach and hourly rounding in place. Problem: PROTECTIVE PRECAUTIONS  Goal: Patient will remain free of nosocomial Infections  Outcome: Ongoing  Pt showing no S/S of infection; Protective precautions followed this shift     Problem: Infection - Central Venous Catheter-Associated Bloodstream Infection:  Goal: Will show no infection signs and symptoms  Will show no infection signs and symptoms   Outcome: Ongoing  CVC site remains free of signs/symptoms of infection.  No drainage, edema, erythema, pain, or warmth noted at site. Dressing changes continue per protocol and on an as needed basis - see flowsheet. Compliant with BCC Bath Protocol:  Performed CHG bath today per BCC protocol utilizing CHG solution in the shower. CVC site cleansed with CHG wipe over dressing, skin surrounding dressing, and first 6\" of IV tubing. Pt tolerated well. Continued to encourage daily CHG bathing per [facility] protocol.         Problem: Venous Thromboembolism:  Goal: Will show no signs or symptoms of venous thromboembolism  Will show no signs or symptoms of venous thromboembolism   Outcome: Ongoing      Problem: Nutrition  Goal: Optimal nutrition therapy  Outcome: Ongoing  Encouraged pt to eat small frequent meals as tolerated

## 2018-10-01 NOTE — PROGRESS NOTES
16   Ht 5' 9\" (1.753 m)   Wt 261 lb 9.6 oz (118.7 kg)   SpO2 99%   BMI 38.63 kg/m²     Weight:    Wt Readings from Last 3 Encounters:   09/30/18 261 lb 9.6 oz (118.7 kg)   08/14/18 266 lb 12.1 oz (121 kg)   10/09/14 294 lb (133.4 kg)       General: Awake, alert and oriented. HEENT: normocephalic, PERRL, no scleral erythema or icterus, Oral mucosa intact, throat clear  NECK: supple without palpable adenopathy  BACK: Straight  SKIN: warm dry and intact without lesions rashes or masses  CHEST: CTA bilaterally without use of accessory muscles  CV: Normal S1 S2, RRR, no MRG  ABD: NT, ND, normoactive BS, no palpable masses or hepatosplenomegaly  M/S:  no erythema   EXTREMITIES: No edema, denies calf tenderness  NEURO: CN II - XII grossly intact  CATHETER: Left TLH (Barrat, 9/20/18) - no erythema     Data:   CBC:   Recent Labs      09/29/18   0305  09/30/18   0405  10/01/18   0310   WBC  0.1*  0.1*  0.1*   HGB  9.1*  8.4*  8.0*   HCT  25.8*  24.1*  22.6*   MCV  95.8  95.8  94.7   PLT  47*  22*  9*     BMP/Mag:  Recent Labs      09/29/18 0305 09/30/18 0405  10/01/18   0310   NA  142  139  138   K  3.6  3.9  3.9   CL  111*  108  107   CO2  20*  22  22   PHOS   --    --   2.8   BUN  16  16  16   CREATININE  0.6*  0.6*  0.6*   MG  1.40*  1.80  1.50*     LIVP:   Recent Labs      09/29/18 0305 09/30/18   0405  10/01/18   0310   AST  13*  85*  208*   ALT  12  68*  211*   BILIDIR  <0.2  <0.2  <0.2   BILITOT  0.4  0.5  0.7   ALKPHOS  61  64  80     Uric Acid:    Recent Labs      10/01/18   0310   LABURIC  3.4*     Coags:   Recent Labs      10/01/18   0310   PROTIME  14.6*   INR  1.28*   APTT  29.8     Tacro:    Recent Labs      09/28/18   0905  09/29/18   0845   TACROLEV  18.3  12.7       PROBLEM LIST:         1. 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 Hereditary hemochromatosis - needs post BMT phlebotomy   3. LLE DVT (11/2017)  4. Right sided chest pain  5. HTN  6. Gout    Post - Transplant Complications:  1. Nausea / Indigestion  2. Insomnia       TREATMENT:        1. Vidaza x 1 cycle (10/30/17)   2. Vyxeos (12/30/2017  3. Intermediate Dose Anna-C x 3 cycles (2/2018 - 4/2018)  4. MUD Allo-pbpc BMT (ProTmune protocol)     ASSESSMENT AND PLAN:        1. AML:  Currently in CR  - S/p MUD Allo-pbpc BMT (ProTmune protocol)      Preparative Regimen: Melphalan and Fludarabine  Date of BMT:  9/27/18  Source of stem cells: PBPC / 6.08x10^6 [id number]/kg  Donor/Recipient Blood Type:  B Positive / B Positive  Donor Sex:  Male / NMDP (DID# [id number])   CMV Donor / Recipient: Negative / Negative      Day + 4     2. ID:  Afebrile w/out evidence of infection.     - Cont Levaquin, Diflucan & Valtrex prophylaxis       3. Heme:  Chemotherapy induced pancytopenia   - Hives w/ platelets. Give benadryl prior   - H/o hereditary hemochromatosis - needs post BMT phlebotomy   - Transfuse for Hgb < 7 and Platelets < 10Z. - Platelet transfusion today.     4.  Metabolic:  Electrolytes are WNL and renal fxn stable except for mild hypoMg and hypoPhos   - Cont IVF:  NS + KCl 10 meq @ 50 mL/hr   - Replace potassium and magnesium per policy. 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 VOD:  No evidence of VOD, but transaminases up, possibly from MTX  Recent Labs      10/01/18   0310   BILIDIR  <0.2   BILITOT  0.7     Admission Weight: 265 lb (120.2 kg)  Current Weight: Weight: 261 lb 9.6 oz (118.7 kg).    - Cont Actigall.     7. Pulmonary:  No active issues  - PFTs:  decreased FEV1  - Encourage IS and ambulation      8.  GI / Nutrition:  C/o decreased appetite, does not appear to be eating   - Cont low microbial diet  -

## 2018-10-02 NOTE — PROGRESS NOTES
4/2018)  4. MUD Allo-pbpc BMT (ProTmune protocol)     ASSESSMENT AND PLAN:        1. AML:  Currently in CR  - S/p MUD Allo-pbpc BMT (ProTmune protocol)      Preparative Regimen: Melphalan and Fludarabine  Date of BMT:  9/27/18  Source of stem cells: PBPC / 6.08x10^6 if89bjpou/kg  Donor/Recipient Blood Type:  B Positive / B Positive  Donor Sex:  Male / NMDP (DID# [id number])   CMV Donor / Recipient: Negative / Negative      Day + 5     2. ID:  Afebrile w/out evidence of infection.    - Cont Levaquin, Diflucan & Valtrex prophylaxis       3. Heme:  Chemotherapy induced pancytopenia   - Hives w/ platelets; pre-medicate w/ benadryl prior   - H/o hereditary hemochromatosis - needs post BMT phlebotomy   - Transfuse for Hgb < 7 and Platelets < 44P.    - No transfusion today.     4.  Metabolic:  Electrolytes are WNL and renal fxn stable except for mild hypoMg and hypoPhos   - Cont IVF:  NS + KCl 10 meq @ 50 mL/hr   - Replace potassium and magnesium per policy.     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 VOD:  No evidence of VOD, but transaminases up, now trending back down  Recent Labs      10/02/18   0320   BILIDIR  0.3   BILITOT  0.7     Admission Weight: 265 lb (120.2 kg)  Current Weight: Weight: 263 lb 4 oz (119.4 kg).    - Cont Actigall.     7. Pulmonary:  No active issues  - PFTs:  decreased FEV1  - Encourage IS and ambulation      8.  GI / Nutrition:  C/o decreased appetite, does not appear to be eating well  - Cont low microbial diet  - Encourage small more frequent meals   - Encourage Protein supplements if not eating   - Dietary to follow  Nausea: Due to chemotherapy and neutropenia   -

## 2018-10-03 NOTE — PLAN OF CARE
Problem: Falls - Risk of:  Goal: Will remain free from falls  Will remain free from falls   Outcome: Ongoing  + High Fall Risk per WRIGHT/ABCDS: Explained fall risk precautions to pt and family and rationale behind their use to keep the patient safe. Pt bed is in low position, side rails up, call light and belongings are in reach. Fall wristband applied and present on pts wrist.  Bed alarm on. Pt encouraged to call for assistance. Will continue with hourly rounds for PO intake, pain needs, toileting and repositioning as needed. Problem: Pain:  Goal: Pain level will decrease  Pain level will decrease   Outcome: Ongoing  Pt continues to endorse LLQ pain, but says he does not need pain medication at this time. Pt states the pain is intermittent and tolerable. Pt instructed to call with any change in condition. Will continue to monitor. Problem: Bleeding:  Goal: Will show no signs and symptoms of excessive bleeding  Will show no signs and symptoms of excessive bleeding   Outcome: Ongoing  Patient's hemoglobin this AM:   Recent Labs      10/02/18   0320   HGB  7.6*     Patient's platelet count this AM:   Recent Labs      10/02/18   0320   PLT  13*    Thrombocytopenia Precautions in place. Patient showing no signs or symptoms of active bleeding. Transfusion not indicated at this time. Patient verbalizes understanding of all instructions. Will continue to assess and implement POC. Call light within reach and hourly rounding in place. Problem: PROTECTIVE PRECAUTIONS  Goal: Patient will remain free of nosocomial Infections  Outcome: Ongoing  Pt remains in protective precautions. Pt educated on wearing mask when in hallways. Pt, staff, and visitors adhering to handwashing guidelines. Pt educated to shower or bathe daily with chlorhexidine and linens changed daily per protocol. Pt verbalizes understanding of low microbial diet. Will continue to monitor.        Problem: Infection - Central Venous

## 2018-10-03 NOTE — PROGRESS NOTES
Nutrition Assessment    Type and Reason for Visit: Reassess    Nutrition Recommendations:   1. PO Diet: Continue current diet, monitor ability to advance diet as tolerated  2. ONS: Offer Ensure Clear bid  3. Monitor weight, labs and clinical progress  4. Monitor, record and encourage PO intake at all meals through admission. 5. Consume small/frequent meals and snacks to build appetite  6. Avoid high fat/fried foods and foods with strong odors to help control nausea  7. Consume foods that are low in fiber, fat, lactose and sugar to help control diarrhea    Malnutrition Assessment:  · Malnutrition Status: No malnutrition  · Context: Acute illness or injury  · Findings of the 6 clinical characteristics of malnutrition (Minimum of 2 out of 6 clinical characteristics is required to make the diagnosis of moderate or severe Protein Calorie Malnutrition based on AND/ASPEN Guidelines):  1. Energy Intake-Less than or equal to 75%, greater than or equal to 5 days    2. Weight Loss-Unable to assess (weight changes r/t fluid balance),    3. Fat Loss-No significant subcutaneous fat loss,    4. Muscle Loss-No significant muscle mass loss,    5. Fluid Accumulation-No significant fluid accumulation,    6.  Strength-Not measured    Nutrition Diagnosis:   · Problem: Increased nutrient needs  · Etiology: related to Catabolic illness     Signs and symptoms:  as evidenced by Other (chemo and s/p BMT)    Nutrition Assessment:  · Subjective Assessment: Follow-up for po improvement and ONS acceptance. Pt reports he is feeling down and is very sleepy. Pt states his appetite is also down. Pt reports nausea at times. Pt c/o LLQ abd pain. Acute diverticulitis noted on CT scan. Pt with loose BM's per I/O. Diet changed to clears on 10/2. Pt interested in trying Clear ONS. Pt is not eating well per I/O. RD noted weight up, most likely from fluids. +5.5 L since adm noted.    · Nutrition-Focused Physical Findings: Appears obese; trace RLE

## 2018-10-04 NOTE — PLAN OF CARE
Ongoing  CVC site remains free of signs/symptoms of infection. No drainage, edema, erythema, pain, or warmth noted at site. Dressing changes continue per protocol and on an as needed basis - see flowsheet. Compliant with [facility] Bath Protocol:  Performed CHG bath today per B[facility] protocol utilizing CHG solution in the shower. CVC site cleansed with CHG wipe over dressing, skin surrounding dressing, and first 6\" of IV tubing. Pt tolerated well. Continued to encourage daily CHG bathing per [address] protocol. Problem: Discharge Planning:  Goal: Discharged to appropriate level of care  Discharged to appropriate level of care   Outcome: Ongoing  Pt D+7 of transplant. Will be d/c one counts recover and medically stable. Pt aware of current POC. Problem: Venous Thromboembolism:  Goal: Will show no signs or symptoms of venous thromboembolism  Will show no signs or symptoms of venous thromboembolism   Outcome: Ongoing  Adherent with DVT Prevention: Pt is at risk for DVT d/t decreased mobility and cancer treatment. Pt educated on importance of activity. Pt has orders for SCDs while in bed. Pt verbalizes understanding of need for prophylaxis while inpatient. .    Problem: Activity:  Goal: Ability to tolerate increased activity will improve  Ability to tolerate increased activity will improve   Outcome: Ongoing  Pt has been up and walking in the hallway twice today and up in the chair twice as well. Pt has continued to have muscle spasms in his arms throughout the day. Patient has dropped items from his hand due to the spasm, but has strong hand . Dr. Leticia Tello is aware will continue to monitor. Problem: Nutrition  Goal: Optimal nutrition therapy  Outcome: Ongoing  Pt is on a clear liquid diet. Pt has had little intake all day. Will continue to encourage to drink ensure and other clear liquids.       Problem: Nausea/Vomiting:  Goal: Absence of nausea/vomiting  Absence of nausea/vomiting   Outcome: Ongoing  Pt has not complained of any nausea or vomiting today.

## 2018-10-05 NOTE — PROGRESS NOTES
to 40 po bid as needed over the weekend  - Replace potassium and magnesium per policy.     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 VOD:  No evidence of VOD, but LFTs elevated  Recent Labs      10/05/18   0315   BILIDIR  0.9*   BILITOT  1.4*     Admission Weight: 265 lb (120.2 kg)  Current Weight: Weight: 266 lb 9.6 oz (120.9 kg). - Cont Actigall.  - Check ABD US w/ dopplers (10/5/18) - Pending      7. Pulmonary:  No active issues  - PFTs:  decreased FEV1  - Encourage IS and ambulation      8.  GI / Nutrition:  C/o decreased appetite d/t neutropenia, esophagitis and acute diverticulitis. Acute diverticulitis:  - Cont IV abx  - Clear liquid diet  - Cont Oxycodone for pain  Nausea: Due to chemotherapy and neutropenia   - Cont Zofran, Compazine and Ativan as needed   - Cont Maalox and PPI for indigestion   Diarrhea:  - C. Diff negative (9/28/18) - Try to limit imodium w/ acute diverticulitis    Esophagitis:  - Cont oxycodone 5 mg q4hrs pr  - Cont MMW prn     9. Right Sided Chest Pain:  Etiology unclear, but appears to be musculoskeletal   - CTA Chest (9/12/18) - No acute findings   - Cont baclofen TID, Oxycodone prn  - Cont PT/OT  - Encouraged to be out of bed as much as possible and to use bike in room     10. HTN:  Slightly elevated BP  - Cont Norvasc 5 mg daily and Propanolol 80 mg daily      11. Left Great Ingrown Toenail Removal:  No acute issues   - Dr. Carey Ro to follow as needed     12. Insomnia:  Chronic  - Cont Ambien 5 mg prn and may repeat dose in 2 - 3 hours.      13.  Neuro:  Increased drowsiness w/ muscle twitching and dropping things more easily at night, etiology

## 2018-10-05 NOTE — PLAN OF CARE
infection. No drainage, edema, erythema, pain, or warmth noted at site. Dressing changes continue per protocol and on an as needed basis - see flowsheet. Compliant with [facility] Bath Protocol:  Performed CHG bath today per [facility] protocol utilizing CHG solution in the shower. CVC site cleansed with CHG wipe over dressing, skin surrounding dressing, and first 6\" of IV tubing. Pt tolerated well. Continued to encourage daily CHG bathing per [address] protocol. Problem: Activity:  Goal: Ability to tolerate increased activity will improve  Ability to tolerate increased activity will improve   Outcome: Ongoing  Pt walked half of wright with 1x assist and walker. RN and wife at pt's side. Pt up to bathroom multiple times this shift. Gait is altered but steady at this time. Problem: Nausea/Vomiting:  Goal: Absence of nausea/vomiting  Absence of nausea/vomiting   Outcome: Ongoing  No reports of nausea during this shift. Will monitor.

## 2018-10-06 NOTE — PROGRESS NOTES
Output             2500 ml   Net             1090 ml       Vital Signs:  /64   Pulse 97   Temp 98.4 °F (36.9 °C) (Oral)   Resp 18   Ht 5' 9\" (1.753 m)   Wt 268 lb 6 oz (121.7 kg)   SpO2 96%   BMI 39.63 kg/m²     Weight:    Wt Readings from Last 3 Encounters:   10/05/18 268 lb 6 oz (121.7 kg)   08/14/18 266 lb 12.1 oz (121 kg)   10/09/14 294 lb (133.4 kg)       General: drowsy, but arousable    HEENT: normocephalic, PERRL, no scleral erythema or icterus, Oral mucosa dry w/ diffuse erythema and mild edema   NECK: supple without palpable adenopathy  BACK: Straight  SKIN: warm dry and intact without lesions rashes or masses  CHEST: CTA bilaterally without use of accessory muscles  CV: Normal S1 S2, RRR, no MRG  ABD: mild tenderness in LLQ,no rebound or guarding,  ND, normoactive BS, no palpable masses or hepatosplenomegaly  M/S:  no erythema   EXTREMITIES: 1+ BLE edema, denies calf tenderness  NEURO: CN II - XII grossly intact  CATHETER: Left TLH (Barrat, 9/20/18) - erythema and excoriation from previous bandage    Data:   CBC:   Recent Labs      10/04/18   0350  10/05/18   0315  10/06/18   0315   WBC  0.0*  0.0*  0.0*   HGB  8.0*  7.2*  6.1*   HCT  22.3*  20.4*  17.0*   MCV  92.7  92.7  90.8   PLT  19*  9*  14*     BMP/Mag:  Recent Labs      10/04/18   0350  10/05/18   0315  10/06/18   0315   NA  139  138  142   K  4.2  4.1  4.0   CL  107  106  109   CO2  20*  19*  19*   PHOS   --   3.0   --    BUN  21*  26*  28*   CREATININE  0.9  1.1  1.0   MG  1.70*  1.60*  1.40*     LIVP:   Recent Labs      10/04/18   0350  10/05/18   0315  10/06/18   0315   AST  76*  58*  52*   ALT  167*  134*  106*   BILIDIR  0.6*  0.9*  0.8*   BILITOT  1.2*  1.4*  1.2*   ALKPHOS  90  83  75     Uric Acid:    Recent Labs      10/05/18   0315   LABURIC  2.8*     Coags:   Recent Labs      10/04/18   0350   PROTIME  15.7*   INR  1.38*   APTT  29.4     Tacro:    Recent Labs      10/05/18   0820   TACROLEV  16.1     CT abd/pelvis

## 2018-10-07 NOTE — PROGRESS NOTES
150/69   Pulse 92   Temp 98 °F (36.7 °C) (Temporal)   Resp 16   Ht 5' 9\" (1.753 m)   Wt 265 lb 6.4 oz (120.4 kg)   SpO2 98%   BMI 39.19 kg/m²     Weight:    Wt Readings from Last 3 Encounters:   10/06/18 265 lb 6.4 oz (120.4 kg)   08/14/18 266 lb 12.1 oz (121 kg)   10/09/14 294 lb (133.4 kg)       General: drowsy, but arousable    HEENT: normocephalic, PERRL, no scleral erythema or icterus, Oral mucosa dry w/ diffuse erythema and mild edema   NECK: supple without palpable adenopathy  BACK: Straight  SKIN: warm dry and intact without lesions rashes or masses  CHEST: CTA bilaterally without use of accessory muscles  CV: Normal S1 S2, RRR, no MRG  ABD: mild tenderness in LLQ,no rebound or guarding,  ND, normoactive BS, no palpable masses or hepatosplenomegaly  M/S:  no erythema   EXTREMITIES: 1+ BLE edema, denies calf tenderness  NEURO: CN II - XII grossly intact  CATHETER: Left TLH (Barr, 9/20/18) - erythema and excoriation from previous bandage    Data:   CBC:   Recent Labs      10/05/18   0315  10/06/18   0315  10/07/18   0350   WBC  0.0*  0.0*  0.0*   HGB  7.2*  6.1*  6.6*   HCT  20.4*  17.0*  18.4*   MCV  92.7  90.8  90.0   PLT  9*  14*  9*     BMP/Mag:  Recent Labs      10/05/18   0315  10/06/18   0315  10/07/18   0350   NA  138  142  143   K  4.1  4.0  4.0   CL  106  109  111*   CO2  19*  19*  21   PHOS  3.0   --    --    BUN  26*  28*  32*   CREATININE  1.1  1.0  1.1   MG  1.60*  1.40*  1.90     LIVP:   Recent Labs      10/05/18   0315  10/06/18   0315  10/07/18   0350   AST  58*  52*  69*   ALT  134*  106*  110*   BILIDIR  0.9*  0.8*  0.6*   BILITOT  1.4*  1.2*  1.2*   ALKPHOS  83  75  77     Uric Acid:    Recent Labs      10/05/18   0315   LABURIC  2.8*     Coags:   No results for input(s): PROTIME, INR, APTT in the last 72 hours. Tacro:    Recent Labs      10/05/18   0820   TACROLEV  16.1     CT abd/pelvis 10/02/18  1. Acute sigmoid diverticulitis. No evidence of perforation or abscess.     2. Small bilateral SCDs while in bed in place. Contraindications to pharmacologic prophylaxis: Thrombocytopenia  Contraindications to mechanical prophylaxis: None    - Disposition:  Home once blood counts recover and toxicities resolved following allogeneic transplant      MD Jarvis Haynes.  Lima Magallanes DO, MS

## 2018-10-09 NOTE — PROGRESS NOTES
tomorrow. Discussed with Dr Eldon Mckinley                                       Please call with questions. Jacquelyn Guevara Pharm. [facility] Clinical Pharmacist  Wireless:  [phone]  10/9/2018 1:42 PM

## 2018-10-10 NOTE — PROGRESS NOTES
Pt needs reinforcement/ continued education/ and encouragement on skin care, medication regimens, and participation in daily care. Pt has been educated and encouraged to follow recommended orders per MD to help improve quality of care and pt continues to refuse. Will continue to encourage and educate.  Dinora Castro

## 2018-10-10 NOTE — PROGRESS NOTES
1.4*  1.3*   ALKPHOS  85  94  90     Uric Acid:    Recent Labs      10/10/18   0402   LABURIC  4.0     Coags:   Recent Labs      10/08/18   0330   PROTIME  15.9*   INR  1.39*   APTT  29.7     Tacro:    Recent Labs      10/07/18   0840  10/08/18   1000  10/09/18   0935   TACROLEV  6.4  9.4  5.3     CT abd/pelvis 10/02/18  1. Acute sigmoid diverticulitis. No evidence of perforation or abscess. 2. Small left pleural effusion    Microbiology:  1. 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 Hereditary hemochromatosis - needs post BMT phlebotomy   3. LLE DVT (11/2017)  4. Right sided chest pain  5. HTN  6. Gout    Post - Transplant Complications:  1. Nausea / Indigestion  2. Insomnia   3. Acute diverticulitis  4. Mucositis, grade 3  5. Staph Simulans UTI (10/2/18)  6. Elevated LFTs  7. DEVON      TREATMENT:        1. Vidaza x 1 cycle (10/30/17)   2. Vyxeos (12/30/2017  3. Intermediate Dose Anna-C x 3 cycles (2/2018 - 4/2018)  4. MUD Allo-pbpc BMT ([facility] protocol)     ASSESSMENT AND PLAN:        1. AML:  Currently in CR  - S/p MUD Allo-pbpc BMT (ProTmune protocol)      Preparative Regimen: Melphalan and Fludarabine  Date of BMT:  9/27/18  Source of stem cells: PBPC / 6.08x10^6 rx90pafvo/kg  Donor/Recipient Blood Type:  B Positive / B Positive  Donor Sex:  Male / NMDP (DID# [id number])   CMV Donor / Recipient: Negative / Negative      Day + 13     2.   ID:  Afebrile, but w/ acute diverticulitis and recent Staph UTI (resolved)  - CT a/p (10/2/18) - Acute sigmoid diverticulitis  - Blood-cx (10/2/18) - NGTD  - Urine cx (10/2/18) - Staph

## 2018-10-10 NOTE — PLAN OF CARE
Problem: Falls - Risk of:  Goal: Will remain free from falls  Will remain free from falls   Outcome: Ongoing  Pt remains free of falls; up ad miguel with assist. Bed in lowest position, wheels locked, side rails up 2/4. Possessions and call light within reach; pt uses call light appropriately. Will continue to monitor. Stable/In Isolation:  . Encouraged pt to be up OOB as much as possible throughout the day and for all meals. Encouraged frequent short naps as necessary to preserve energy but instructed that while awake, pt should be OOB. Pt in isolation and is unable to ambulate in halls d/t restrictions. Continued to encourage activity in room as much as possible. Pt is visualized to be OOB several times this shift ambulating to the bathroom. Will continue to encourage frequent activity. Goal: Absence of physical injury  Absence of physical injury   Outcome: Ongoing      Problem: Bleeding:  Goal: Will show no signs and symptoms of excessive bleeding  Will show no signs and symptoms of excessive bleeding   Outcome: Ongoing      Problem: PROTECTIVE PRECAUTIONS  Goal: Patient will remain free of nosocomial Infections  Outcome: Ongoing  Pt remains in neutropenic precautions per floor policy. Pt, visitors, and staff noted to be following precautions appropriately. Handwashing in place; pt wearing mask in hallway per protocol. Pt in private room. Low microbial diet in place. Will continue to monitor. Problem: Infection - Central Venous Catheter-Associated Bloodstream Infection:  Goal: Will show no infection signs and symptoms  Will show no infection signs and symptoms   Outcome: Ongoing      Problem: Discharge Planning:  Goal: Discharged to appropriate level of care  Discharged to appropriate level of care   Outcome: Ongoing      Problem: Activity:  Goal: Ability to tolerate increased activity will improve  Ability to tolerate increased activity will improve   Outcome: Ongoing      Comments: .

## 2018-10-11 NOTE — PROGRESS NOTES
down  - Cont Morphine 2-4 mg IV q4h prn  - Cont MMW prn  - Cont Dex swish   - Cont IV meds when possible  - Cont TPN Day + 4 (started 10/8/18)     9.  Right Sided Chest Pain:  Etiology unclear, but appears to be musculoskeletal   - CTA Chest (9/12/18) - No acute findings   - Cont baclofen TID, but dose reduced to 5 mg tid   - Cont PT/OT  - Encouraged to be out of bed      10. HTN:  BP stable  - Cont Norvasc 5 mg daily and Propanolol 80 mg daily   - Cont Lasix 40 mg IV bid (increased 10/10/18)     11. Left Great Ingrown Toenail Removal:  No acute issues   - Dr. Aurelio Bertrand to follow as needed     12. Insomnia:  Chronic  - Avoid Ambien and Ativan d/t AMS     13.  Neuro:  Acute encephalopathy, likely drug induced from Oxycodone, Ambien, Ativan and Baclofen has resolved   - He has been more awake since limiting these meds, especially Ambien  - CT Head (10/5/18) - No acute findings   - Cont to follow     14. :  Penis and scrotal pain, possibly fungal infection  - Cont Miconazole powder    - DVT Prophylaxis: Platelets <37,726 cells/dL - prophylactic lovenox on hold and mechanical prophylaxis with bilateral SCDs while in bed in place. Contraindications to pharmacologic prophylaxis: Thrombocytopenia  Contraindications to mechanical prophylaxis: None    - Disposition:  Home once blood counts recover and toxicities resolved following allogeneic transplant      Doris Lundborg, APRN - CNP      Celeste Raudel.  Charlotte Lim DO, MS

## 2018-10-11 NOTE — PLAN OF CARE
Problem: Diarrhea:  Goal: Bowel elimination is within specified parameters  Bowel elimination is within specified parameters  Outcome: Ongoing  Pt with 3 loose/semi formed stools this shift, refusing imodium at this time.

## 2018-10-11 NOTE — PLAN OF CARE
Problem: Pain:  Goal: Pain level will decrease  Pain level will decrease   Outcome: Ongoing  Pt continue to complain of \"mouth and throat pain\", PRN morphine administered per orders, pt resting comfortably upon reassessment. Problem: Bleeding:  Goal: Will show no signs and symptoms of excessive bleeding  Will show no signs and symptoms of excessive bleeding   Outcome: Ongoing  Patient's hemoglobin this AM:   Recent Labs      10/11/18   0233   HGB  7.7*     Patient's platelet count this AM:   Recent Labs      10/11/18   0233   PLT  9*    Thrombocytopenia Precautions in place. Patient showing no signs or symptoms of active bleeding. Patient transfused blood products per orders - see flowsheet. Patient verbalizes understanding of all instructions. Will continue to assess and implement POC. Call light within reach and hourly rounding in place. Problem: PROTECTIVE PRECAUTIONS  Goal: Patient will remain free of nosocomial Infections  Outcome: Ongoing  Pt remains in protective precautions. Pt, staff, and visitors adhering to handwashing guidelines. Pt educated to shower or bathe daily with chlorhexidine and linens changed daily per protocol. Pt verbalizes understanding of low microbial diet. Will continue to monitor. Problem: Nausea/Vomiting:  Goal: Absence of nausea/vomiting  Absence of nausea/vomiting   Outcome: Ongoing  No complaints of nausea this shift. Problem: Risk for Impaired Skin Integrity  Goal: Tissue integrity - skin and mucous membranes  Structural intactness and normal physiological function of skin and  mucous membranes. Outcome: Ongoing  Patient continues with abrasion area on buttocks, and redness/pain in scrotum and penis. Venelex applied after educating pt the importance of skin product , encouraged pt to reposition every 2 hours.

## 2018-10-12 NOTE — PROGRESS NOTES
prophylaxis  - Micafungin (tx dose) Day + 1  - Vanc day +1 (due to recurrent high fever this am)    - Merrem Day + 8     Abx History:  Zosyn Day + 4 (stopped 10/5/18)  IV Vanco x 6 days (d/c 10/09/18)     3. Heme:  Chemotherapy induced pancytopenia   - Hives w/ platelets; pre-medicate w/ benadryl prior   - H/o hereditary hemochromatosis - needs post BMT phlebotomy   - Transfuse for Hgb < 7 and Platelets < 02K. - Platelet & PRBC transfusion today     4. Metabolic:  Electrolytes are WNL except for TPN induced hyperglycemia, hyperNa, hypoK & hypoMag; renal function off slightly, Scr up to 1.1  - Decrease Lasix 20 mg IV bid (decreased 10/12/18)   - Start low regimen regular SSI q6hrs  - Cont D5W @ 50 mL/hr + TPN @ 80 mL/hr  - Replace potassium and magnesium per policy.     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 (did not receive day + 11 d/t mucositis)  - Cont Prograf to 1 mg BID     Tacro Level:  Lab Results   Component Value Date    TACROLEV 7.0 10/10/2018    TACROLEV 5.3 10/09/2018    TACROLEV 9.4 10/08/2018     6. VOD:  No evidence of VOD, but T.bili elevated/stable, transaminitis improved  - ABD US w/ dopplers (10/5/18) - Negative w/ normal flow    Recent Labs      10/12/18   0330   BILIDIR  1.1*   BILITOT  1.5*     Admission Weight: 265 lb (120.2 kg)  Current Weight: Weight: 257 lb (116.6 kg). - Cont Actigall. 7. Pulmonary:  No active issues  - PFTs:  decreased FEV1  - Encourage IS and ambulation      8.  GI / Nutrition:  Poor oral intake d/t nausea and grade 3 mucositis   Acute diverticulitis:  Pain has improved  - Cont IV abx, as above   - Cont clear liquid diet & TPN  Nausea: Due to chemotherapy and neutropenia   - Cont Zofran, Compazine as needed, avoid Ativan d/t drowsiness    - Cont Maalox and PPI for indigestion   Diarrhea:  Still w/ loose BMs   - C.  Diff negative (9/28/18) - Cont imodium as needed   Stomatitis, grade 3:  Difficulty w/ swallowing, but still able to get liquids and some pills down  - Cont Morphine 2-4 mg IV q3h prn  - Cont MMW prn  - Cont Dex swish   - Cont IV meds when possible  - Cont TPN Day + 5 (started 10/8/18)     9.  Right Sided Chest Pain:  Etiology unclear, but appears to be musculoskeletal   - CTA Chest (9/12/18) - No acute findings   - Cont baclofen TID, but dose reduced to 5 mg tid   - Cont PT/OT  - Encouraged to be out of bed      10. HTN:  BP stable  - Cont Norvasc 5 mg daily and Propanolol 80 mg daily   - Decrease Lasix 20 mg IV bid (decreased 10/12/18)     11. Left Great Ingrown Toenail Removal:  No acute issues   - Dr. Kerry Tyler to follow as needed     12. Insomnia:  Chronic  - Avoid Ambien and Ativan d/t AMS     13.  Neuro:  Acute encephalopathy, likely drug induced from Oxycodone, Ambien, Ativan and Baclofen has resolved   - He has been more awake since limiting these meds, especially Ambien  - CT Head (10/5/18) - No acute findings   - Cont to follow     14. :  Penis and scrotal pain, possibly fungal infection  - Cont Miconazole powder    - DVT Prophylaxis: Platelets <42,681 cells/dL - prophylactic lovenox on hold and mechanical prophylaxis with bilateral SCDs while in bed in place.   Contraindications to pharmacologic prophylaxis: Thrombocytopenia  Contraindications to mechanical prophylaxis: None    - Disposition:  Home once blood counts recover and toxicities resolved following allogeneic transplant    Ady Route, APRN - CNP     Sven Elmore MD

## 2018-10-12 NOTE — PROGRESS NOTES
kimberley, pt reporting yes. unclear if due to hearing vs cognition                    Type of ROM/Therapeutic Exercise  Comment: Pt encouraged to participate in light AROM to reduce muscle tightness, pt adamantly declined at this time due to fatigue                    Assessment   Performance deficits / Impairments: Decreased functional mobility ; Decreased ADL status; Decreased endurance  Assessment: Pt demonstrated decreased activity tolerance and decreased tolerance for functional mobiltiy this date. Pt with limited participation in LB dressing and ADLs, declining further mobiltiy tasks at this time. Pt would benefit from acute OT services, continue to assess pt progress for dc recommendations. Treatment Diagnosis: decreased ADLs and transfers secondary to AML  Prognosis: Fair  Patient Education: role of OT/ importance of participation in daily tasks/ seated ther ex- good understanding verb however adamantly declining at this time  No Skilled OT: Independent with ADL's;At baseline function; No OT goals identified  REQUIRES OT FOLLOW UP: Yes  Activity Tolerance  Activity Tolerance: Patient limited by fatigue;Treatment limited secondary to decreased cognition  Activity Tolerance: Pt participated in LB dressing and sitting EOB for extensive amount of time. Pt declined further OOB tasks, declined functional mobiltiy at this time, and declined seated ther ex. Pt reported some fatigue but also reported \"I just don't want to\"   Safety Devices  Safety Devices in place: Yes  Type of devices: Nurse notified;Call light within reach; Left in bed;Bed alarm in place (pt declined OOB)          Plan   Plan  Times per week: 2-5  Times per day: Daily  Current Treatment Recommendations: Strengthening, Balance Training, Functional Mobility Training, Endurance Training, Self-Care / ADL           AM-PAC Score        AM-[facility] Inpatient Daily Activity Raw Score: 16  AM-PAC Inpatient ADL T-Scale Score : 35.96  ADL Inpatient CMS 0-100% Score:

## 2018-10-12 NOTE — PROGRESS NOTES
VSS. Meds given whole with water. PRN imodium given at pt request. Assessment completed. One unit of platelets given at this time. Tolerated well. Assisted pt to the bathroom at this time. Pt was able to urinate and have a bowel movement. Repositioned pt in chair. Pt resting comfortably in bed. Call light within reach. Denies further needs at this time. Will continue to monitor.  Electronically signed by Ricardo Calderón RN on 10/12/2018 at 9:28 AM

## 2018-10-12 NOTE — PROGRESS NOTES
Parenteral Nutrition  Continued Inpatient Monitoring    Nutrition Evaluation:   · Evaluation: Progressing toward goals   · Goals: Pt will tolerate TPN and diet to meet 100% needs   · Monitoring: Meal Intake, Supplement Intake, Diet Tolerance, Weight, Pertinent Labs, Nausea or Vomiting, Diarrhea, Comparative Standards, PN Intake, PN Tolerance    See Adult Nutrition Doc Flowsheet for more detail.      Electronically signed by Benitez Arriaga RD, REYES on 10/12/18 at 10:37 AM    Contact Number: [phone]

## 2018-10-12 NOTE — PLAN OF CARE
Problem: Falls - Risk of:  Goal: Will remain free from falls  Will remain free from falls   Outcome: Ongoing    + Screening for Orthostasis and/or + High Fall Risk per WRIGHT/ABCDS: Explained fall risk precautions to pt and family and rationale behind their use to keep the patient safe. Pt bed is in low position, side rails up, call light and belongings are in reach. Fall wristband applied and present on pts wrist.  Bed alarm on. Pt encouraged to call for assistance. Will continue with hourly rounds for PO intake, pain needs, toileting and repositioning as needed. Up x1 with IV pole, utilizes walker for longer distances. Calls out appropriately. Problem: Pain:  Goal: Pain level will decrease  Pain level will decrease   Outcome: Ongoing  Reported pain due to mucositis once this shift. See flowsheet. PRN pain med given. Pt satisfied. Problem: Bleeding:  Goal: Will show no signs and symptoms of excessive bleeding  Will show no signs and symptoms of excessive bleeding   Outcome: Ongoing  Patient's hemoglobin this AM:   Recent Labs      10/12/18   0330   HGB  6.8*     Patient's platelet count this AM:   Recent Labs      10/12/18   0330   PLT  9*    Thrombocytopenia Precautions in place. Patient showing no signs or symptoms of active bleeding. Patient verbalizes understanding of all instructions. Will continue to assess and implement POC. Call light within reach and hourly rounding in place. Will transfuse when available. Pt febrile at this time. Problem: PROTECTIVE PRECAUTIONS  Goal: Patient will remain free of nosocomial Infections  Outcome: Ongoing  Pt remains in protective precautions. No living plants or fresh flowers in his/her room. Patient educated on wearing mask when in hallways. Patient, staff, and visitors adhering to handwashing guidelines. Patient cleansed with chlorhexidine wipes and linens changed daily per protocol. Pt verbalizes understanding of low microbial diet.  Patient remains free

## 2018-10-12 NOTE — PROGRESS NOTES
Occupational Therapy  No Treatment  Chart reviewed, platelets at 9 HGB 6.8. Pt receiving platelets upon OT attempt, RN reporting pt to have blood transfusion later this am. Will follow up later this date as schedule allows and as medically/ clinically appropriate. RN aware. Abner Valero.  [address] [facility], OTR/L [id number]

## 2018-10-12 NOTE — PLAN OF CARE
Problem: Nutrition  Goal: Optimal nutrition therapy  Outcome: Ongoing  Nutrition Problem: Increased nutrient needs  Intervention: Food and/or Nutrient Delivery: Continue current diet, Continue current ONS, Continue Parenteral Nutrition  Nutritional Goals: Pt will tolerate TPN and diet to meet 100% needs

## 2018-10-13 NOTE — PROGRESS NOTES
VSS. Assisted pt to bathroom at this time. Pt was able to have a bowel movement and urinate. Pt was incontinent as well. Pt resting comfortably in chair. Call light within reach. Denies further needs at this time. Will continue to monitor.  Electronically signed by Ricardo Calderón RN on 10/13/2018 at 10:15 AM

## 2018-10-13 NOTE — PLAN OF CARE
Problem: Falls - Risk of:  Goal: Will remain free from falls  Will remain free from falls   Pt is a High fall risk. See Lex Humbles Fall Score and ABCDS Injury Risk assessments. Explained fall risk precautions to pt and family and rationale behind their use to keep the patient safe. Pt bed is in low position, side rails up, call light and belongings are in reach. Fall wristband applied and present on pts wrist.  Bed alarm on. Pt encouraged to call for assistance. Will continue with hourly rounds for PO intake, pain needs, toileting and repositioning as needed. Problem: Pain:  Goal: Pain level will decrease  Pain level will decrease   Pt reported pain up to a 7 during shift in his groin area and throat. Pt has ulcers and redness in mouth. Pt has excoriation on buttocks and trav area. Powder and Zinc cream applied per order as well as medication given per order. Pt stated some relief and was able to sleep. Problem: Bleeding:  Goal: Will show no signs and symptoms of excessive bleeding  Will show no signs and symptoms of excessive bleeding   Patient's hemoglobin this AM:   Recent Labs      10/13/18   0315   HGB  7.0*     Patient's platelet count this AM:   Recent Labs      10/13/18   0315   PLT  10*    Thrombocytopenia Precautions in place. Patient showing no signs or symptoms of active bleeding. Patient transfused blood products per orders - see flowsheet. Patient verbalizes understanding of all instructions. Will continue to assess and implement POC. Call light within reach and hourly rounding in place. Problem: PROTECTIVE PRECAUTIONS  Goal: Patient will remain free of nosocomial Infections  Pt remains in protective precautions. No living plants or fresh flowers in his/her room. Patient educated on wearing mask when in hallways. Patient, staff, and visitors adhering to handwashing guidelines. Pt verbalizes understanding of low microbial diet. Patient remains free of nosocomial infections.      Problem: elimination is within specified parameters   Pt had 2 episodes of incontinence while walking. Pt's BM are watery. Medication given per order. Will continue to monitor.

## 2018-10-13 NOTE — PROGRESS NOTES
avoid Ativan d/t drowsiness    - Cont Maalox and PPI for indigestion   Diarrhea:  Still w/ loose BMs   - C. Diff negative (9/28/18) - Cont imodium as needed   Stomatitis, grade 3:  Difficulty w/ swallowing, but still able to get liquids and some pills down  - Cont Morphine 2-4 mg IV q3h prn  - Cont MMW prn  - Cont Dex swish   - Cont IV meds when possible  - Cont TPN Day + 6 (started 10/8/18)     9.  Right Sided Chest Pain:  Etiology unclear, but appears to be musculoskeletal   - CTA Chest (9/12/18) - No acute findings   - Cont baclofen TID, but dose reduced to 5 mg tid   - Cont PT/OT  - Encouraged to be out of bed      10. HTN:  BP stable  - Cont Norvasc 5 mg daily and Propanolol 80 mg daily   - Decrease Lasix 20 mg IV bid (decreased 10/12/18)     11. Left Great Ingrown Toenail Removal:  No acute issues   - Dr. Viji Haney to follow as needed     12. Insomnia:  Chronic  - Avoid Ambien and Ativan d/t AMS     13.  Neuro:  Acute encephalopathy, likely drug induced from Oxycodone, Ambien, Ativan and Baclofen has resolved   - He has been more awake since limiting these meds, especially Ambien  - CT Head (10/5/18) - No acute findings   - Cont to follow     14. :  Penis and scrotal pain, possibly fungal infection  - Cont Miconazole powder    - DVT Prophylaxis: Platelets <06,613 cells/dL - prophylactic lovenox on hold and mechanical prophylaxis with bilateral SCDs while in bed in place.   Contraindications to pharmacologic prophylaxis: Thrombocytopenia  Contraindications to mechanical prophylaxis: None    - Disposition:  Home once blood counts recover and toxicities resolved following allogeneic transplant    MD Jasmeet Mendiola MD

## 2018-10-13 NOTE — PROGRESS NOTES
Pt with enlarged,peeling scrotum and excoriated, raw buttocks. Pt previously refusing venelex ointment and zinc cream. RN cleaned pt thoroughly and applied venelex cream after pt and son educated about importance of using creams and venelex for wound healing. Consult placed to wound care nurse. Will continue to monitor skin integrity.

## 2018-10-14 NOTE — PLAN OF CARE
symptoms  Will show no infection signs and symptoms   Outcome: Ongoing  Pt febrile this shift. Tmax 100.5. Administered tylenol per protocol. Pt with increased respirations and SOB at time of fever. 2L NC placed for comfort (pts Sp02 remained >90%). Once fever broke pt with more regular breathing but remains tachypneic. CVC site remains free of signs/symptoms of infection. No drainage, edema, erythema, pain, or warmth noted at site. Dressing changes continue per protocol and on an as needed basis - see flowsheet. Refusing BCC Bath Protocol:  Despite multiple attempts by this RN, pt refusing shower or bed bath with CHG today. Discussed risks associated with not following BCC bath protocol including increased risk of CVC line infection & sepsis in an immunocompromised pt. Will discuss continued refusal with treatment team if pt continues to refuse daily bath protocol for 2 or more days. CVC site cleansed with CHG wipe over dressing, skin surrounding dressing, and first 6\" of IV tubing. Pt tolerated well. Continued to encourage daily CHG bathing per [facility] protocol. Problem: Discharge Planning:  Goal: Discharged to appropriate level of care  Discharged to appropriate level of care   Outcome: Ongoing  Pt D+16 from [facility]. Will be d/c once counts recover and medically stable. Pt aware of current POC. Problem: Venous Thromboembolism:  Goal: Will show no signs or symptoms of venous thromboembolism  Will show no signs or symptoms of venous thromboembolism   Outcome: Ongoing   Pt is at risk for DVT d/t decreased mobility and cancer treatment. Pt educated on importance of activity. Pt has orders for SCDs while in bed. Pt verbalizes understanding of need for prophylaxis while inpatient. Problem: Activity:  Goal: Ability to tolerate increased activity will improve  Ability to tolerate increased activity will improve   Outcome: Ongoing  Pt ambulating in room up to BR throughout day.   Tolerates

## 2018-10-14 NOTE — PROGRESS NOTES
BCC Allogeneic Progress Note    10/14/2018    Bonita Hodges    :  1945    MRN:  [id number]    Referring MD: Ozzy Bello MD, PhD    Subjective: Still very weak and C/O ongoing mucositis discomfort.     KPS:  50%     Isolation:  VRE / Contact    Medications    Scheduled Meds:   sodium chloride  250 mL Intravenous Once    propranolol  20 mg Oral TID    insulin regular  0-10 Units Subcutaneous Q6H    micafungin (MYCAMINE) IVPB  150 mg Intravenous Daily    VENELEX   Topical BID    pantoprazole  40 mg Intravenous Daily    And    sodium chloride (PF)  10 mL Intravenous Daily    acyclovir  450 mg Intravenous Q12H    miconazole   Topical BID    tacrolimus  1 mg Oral BID    dexamethasone  1 mg Swish & Spit 4x Daily    meropenem  1 g Intravenous Q8H    baclofen  5 mg Oral TID    Saline Mouthwash  15 mL Swish & Spit 4x Daily AC & HS    sodium chloride flush  10 mL Intravenous 2 times per day    ursodiol  250 mg Oral 4x Daily AC & HS     Continuous Infusions:   furosemide (LASIX) 1mg/ml infusion      Adult TPN 3-in-1 - Central Line (Custom) with Lipids      Adult TPN 3-in-1 - Central Line (Custom) with Lipids 79.3 mL/hr at 10/13/18 1545    dextrose      dextrose 20 mL/hr at 10/13/18 0801    sodium chloride      sodium chloride 250 mL (18 1517)     PRN Meds:dextrose, glucose, dextrose, glucagon (rDNA), dextrose, morphine **OR** morphine, diphenhydrAMINE, medicated lip ointment, magic (miracle) mouthwash, acetaminophen, cetirizine, [COMPLETED] loperamide **FOLLOWED BY** loperamide, sodium chloride, simethicone, aluminum & magnesium hydroxide-simethicone, ondansetron, ondansetron, bisacodyl, sodium chloride, alteplase, magnesium hydroxide, magnesium sulfate, potassium chloride, Saline Mouthwash, prochlorperazine **OR** prochlorperazine    ROS:  See above, remainder of 10 point ROS is negative     Physical Exam:    I&O:      Intake/Output Summary (Last 24 hours) at [place]:   No results for input(s): PROTIME, INR, APTT in the last 72 hours. [place]:    Recent Labs      10/12/18   0827   TACROLEV  7.7     CT abd/pelvis 10/02/18  1. Acute sigmoid diverticulitis. No evidence of perforation or abscess. 2. Small left pleural effusion    Microbiology:  1. 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 Hereditary hemochromatosis - needs post BMT phlebotomy   3. LLE DVT (11/2017)  4. Right sided chest pain  5. HTN  6. Gout    Post - Transplant Complications:  1. Nausea / Indigestion  2. Insomnia   3. Acute diverticulitis  4. Mucositis, grade 3  5. Staph Simulans UTI (10/2/18)  6. Elevated LFTs  7. DEVON  8. Acute Encephalopathy   9. Neutropenic Fever      TREATMENT:        1. Vidaza x 1 cycle (10/30/17)   2. Vyxeos (12/30/2017  3. Intermediate Dose Anna-C x 3 cycles (2/2018 - 4/2018)  4. MUD Allo-pbpc BMT (ProTmune protocol)     ASSESSMENT AND PLAN:        1. AML:  Currently in CR  - S/p MUD Allo-pbpc BMT (ProTmune protocol)      Preparative Regimen: Melphalan and Fludarabine  Date of BMT:  9/27/18  Source of stem cells: PBPC / 6.08x10^6 gq81gryci/kg  Donor/Recipient Blood Type:  B Positive / B Positive  Donor Sex:  Male / NMDP (DID# [id number])   CMV Donor / Recipient: Negative / Negative      Day + 17     2.   ID:  Febrile yesterday w/ Tmax of 103, etiology unclear   - cont treatment for acute diverticulitis  - S/p treatment for Staph UTI, Urine cx (10/2/18) - Staph Simulans  - CT a/p (10/2/18) - Acute sigmoid diverticulitis  - Fungitel and galactomannan (10/12/18) - Pending  - Cont IV Acyclovir

## 2018-10-15 NOTE — PROGRESS NOTES
Respiratory Therapy will be initiated for medication and therapeutic interventions upon initiation of aerosolized medication. 2. Physician will be contacted for respiratory rate (RR) greater than 35 breaths per minute. Therapy will be held for heart rate (HR) greater than 140 beats per minute, pending direction from physician. 3. Bronchodilators will be administered via Metered Dose Inhaler (MDI) with spacer when the following criteria are met:  a. Alert and cooperative     b. HR < 140 bpm  c. RR < 30 bpm                d. Can demonstrate a 2-3 second inspiratory hold  4. Bronchodilators will be administered via Hand Held Nebulizer [facility]) to patients when ANY of the following criteria are met  a. Incognizant or uncooperative          b. Patients treated with HHN at Home        c. Unable to demonstrate proper use of MDI with spacer     d. RR > 30 bpm   5. Bronchodilators will be delivered via Metered Dose Inhaler (MDI), HHN, Aerogen to intubated patients on mechanical ventilation. 6. Inhalation medication orders will be delivered and/or substituted as outlined below. Aerosolized Medications Ordering and Administration Guidelines:    1. All Medications will be ordered by a physician, and their frequency and/or modality will be adjusted as defined by the patients Respiratory Severity Index (RSI) score. 2. If the patient does not have documented COPD, consider discontinuing anticholinergics when RSI is less than 9.  3. If the bronchospasm worsens (increased RSI), then the bronchodilator frequency can be increased to a maximum of every 4 hours. If greater than every 4 hours is required, the physician will be contacted. 4. If the bronchospasm improves, the frequency of the bronchodilator can be decreased, based on the patient's RSI, but not less than home treatment regimen frequency.   5. Bronchodilator(s) will be discontinued if patient has a RSI less than 9 and has received no scheduled or as needed treatment for 72  Hrs. Patients Ordered on a Mucolytic Agent:    1. Must always be administered with a bronchodilator. 2. Discontinue if patient experiences worsened bronchospasm, or secretions have lessened to the point that the patient is able to clear them with a cough. Anti-inflammatory and Combination Medications:    1. If the patient lacks prior history of lung disease, is not using inhaled anti-inflammatory medication at home, and lacks wheezing by examination or by history for at least 24 hours, contact physician for possible discontinuation.

## 2018-10-15 NOTE — PROGRESS NOTES
Pt shows signs of confusion this shift. Answers orientation questions appropriately other than date/time but at times is inappropriate during conversation. Very fidgety and overnight he pulled out a lumen from his CVC. CT head completed as well as liver ultrasound (as much as pt could tolerate due to not being able to lie flat, see other progress note). BC x1 and labs sent as ordered. Pt remains afebrile this shift. Will monitor.

## 2018-10-15 NOTE — PROGRESS NOTES
Summary (Last 24 hours) at 10/15/18 0708  Last data filed at 10/15/18 0544   Gross per 24 hour   Intake             2234 ml   Output             3525 ml   Net            -1291 ml       Vital Signs:  /63   Pulse 91   Temp 99.3 °F (37.4 °C) (Axillary)   Resp (!) 34   Ht 5' 9\" (1.753 m)   Wt 257 lb 6.4 oz (116.8 kg)   SpO2 95%   BMI 38.01 kg/m²     Weight:    Wt Readings from Last 3 Encounters:   10/14/18 257 lb 6.4 oz (116.8 kg)   08/14/18 266 lb 12.1 oz (121 kg)   10/09/14 294 lb (133.4 kg)       General: alert, ill appearing    HEENT: normocephalic, alopecia, PERRL, no scleral erythema or icterus, Oral mucosa w/ diffuse erythema, ulcerations and edema   NECK: supple without palpable adenopathy  BACK: Straight  SKIN: erythematous, sand paper like rash on face and ant superior chest.   CHEST: CTA bilaterally without use of accessory muscles  CV: Normal S1 S2, RRR, no MRG  ABD: NT, ND, no rebound or guarding, normoactive BS, no palpable masses or hepatosplenomegaly  M/S:  no erythema   EXTREMITIES: 2+ BLE edema, denies calf tenderness  NEURO: CN II - XII grossly intact  :  Pain and erythema of penis and scrotum, perianal areas of skin breakdown   CATHETER: Left TLH (Barr, 9/20/18) - erythema and excoriation from previous bandage    Data:   CBC:   Recent Labs      10/13/18   0315  10/14/18   0335  10/15/18   0416   WBC  0.1*  0.1*  0.1*   HGB  7.0*  7.9*  8.2*   HCT  19.9*  22.0*  23.9*   MCV  84.4  83.6  85.5   PLT  10*  7*  4*     BMP/Mag:  Recent Labs      10/13/18   0315  10/14/18   0335  10/15/18   0416   NA  145  149*  150*   K  3.4*  3.3*  4.4   CL  107  112*  111*   CO2  26  25  27   PHOS  2.9  2.3*  2.8   BUN  52*  53*  73*   CREATININE  1.2  1.2  1.4*   MG  1.70*  1.70*  2.10     LIVP:   Recent Labs      10/13/18   0315  10/14/18   0335  10/15/18   0416   AST  25  21  38*   ALT  36  30  40   BILIDIR  1.2*  1.4*  1.9*   BILITOT  1.5*  1.6*  2.5*   ALKPHOS  94  83  94     Uric Acid:    Recent

## 2018-10-15 NOTE — PROGRESS NOTES
Clinical Pharmacy Progress Note    Patient Name: Lobo Verdin  YOB: 1945  Diagnosis: AML    GVHD Prophylaxis:  Tacrolimus (Prograf) starting Day -2 per FATE study  · Adjusted according to levels - drawn via red lumen  Methotrexate 30 mg IVP days +1, followed by Methotrexate 20 mg IVP +3, +6, +11 =>        (per FATE study: MTX 15mg/m2 IVP d+1 and MTX 10mg/m2 IVP on +3, +6, +11)     Tacrolimus (Prograf) levels starting day +1  Tacrolimus (Prograf) goal level:  5-15 ng/mL per FATE study    Date SCr Bili Prograf Dose Prograf Level Adjustments / Comments   9/20/18 0.5 0.9 - - Patient admitted for ALLO Lite MUD- Melphlan/Fludara. Will start oral Prograf 2.5 mg po bid on 9/25/18 with 1st level on 9/28/18 and MWF thereafter   9/28/18; day+1 <0.5 0.3 2.5mg po bid 18.3 Discussed with Dr. Mariam Bowman- Level supratherapeutic; will hold tonight's and Sat am's Prograf doses and obtain tacrolimus level on Sat 9/29 at 8:30 and then re-evaluate. 9/29; d+2 0.6 0.4 On hold 12.7 Resume tacrolimus this PM at 1.5 mg po bid with next level Mon 10/1.   10/1/18 d+4 0.6 0.7 1.5 mg po bid 11.4 no change   10/3/18 d+6 0.8 1 1.5 mg po bid 12.9 No change    10/5/18 1.1 1.4 1.5 mg po bid 16.1 Will change Prograf to 0.5 mg po daily and monitor renal and hepatic function with a Prograf level Sunday to ensure level is declining and adjust dose as necessary. Discussed with Dr Darcie Hilario   10/7; d10 1.1 1.2 0.5 mg po daily 6.4 Tacrolimus level therapeutic, no change in dose; continue levels MWF.    10/8/18  D+11 0.9 1.5 0.5 mg po daily 9.4 (drawn 1 hour after dose administered) With improved renal function and level from yesterday 6.4 and today's level 9.4 (drawn 1 hour post administration) will increase Prograf to 1 mg in AM and 0.5 mg nightly. Discussed with Dr Darcie Hilario. Will get additional level Tuesday before dose. 10/9/18 d+12 0.9 1.4 1 mg am/0.5 mg pm 5.3 Level trending to sub-therapeutic. Will increase dose to 1 mg po bid.  Level tomorrow. Discussed with Dr Mary Lou Morrissey   10/10/18 d+13 0.9 1.3 1 mg po bid 7 With dose increased yesterday and level up, will continue present dose. Next level Friday. 10/12/18; d+15 1.1 1.5 1 mg po bid 7.7 No change; levels MWF   10/15/18  D+18 1.4 2.5 1 mg po bid 15.7 Level supra therapeutic with increase in renal and hepatic function. AM dose  held pending level. Will resume prograf at 0.5 mg po bid this evening with daily levels. Please call with questions. Everton Thakkar, Pharm. [facility] Clinical Pharmacist  Wireless:  [phone]  10/15/2018 1:26 PM

## 2018-10-15 NOTE — PLAN OF CARE
became disconnected overnight and remains clamped. Chest xray verified line cont to be in correct placement. Per physician, ok to cont to use line. CVC site remains free of signs/symptoms of infection. No drainage, edema, erythema, pain, or warmth noted at site. Dressing changes continue per protocol and on an as needed basis - see flowsheet. Problem: Discharge Planning:  Goal: Discharged to appropriate level of care  Discharged to appropriate level of care   Outcome: Ongoing  Pt D+17 MUD. Waiting for count recovery for discharge. Pt and family aware of current POC. Problem: Venous Thromboembolism:  Goal: Will show no signs or symptoms of venous thromboembolism  Will show no signs or symptoms of venous thromboembolism   Outcome: Ongoing  Pt is at risk for DVT d/t decreased mobility and cancer treatment. Pt educated on importance of activity. Pt has orders for SCDs while in bed. Pt verbalizes understanding of need for prophylaxis while inpatient. Problem: Nutrition  Goal: Optimal nutrition therapy  Outcome: Ongoing  Pt on clear liquid diet. Able to take in small amounts of grape juice and water throughout shift. Problem: Nausea/Vomiting:  Goal: Absence of nausea/vomiting  Absence of nausea/vomiting   Outcome: Ongoing  No c/o nausea. Problem: Risk for Impaired Skin Integrity  Goal: Tissue integrity - skin and mucous membranes  Structural intactness and normal physiological function of skin and  mucous membranes. Outcome: Ongoing  Pt with excoriation to trav-rectal area and buttocks. Venelex cream applied. Wound care nurse consulted for evaluation. Ulcerations to mucous membranes and lips. Thick secretions in which pt frequently using oral rinses and suction. Generalized +2 swelling. Lasix drip continues per order. Will monitor.      Problem: Infection - Vancomycin-Resistant Enterococci:  Goal: Absence of vancomycin-resistant Enterococci infection  Absence of

## 2018-10-16 NOTE — PROGRESS NOTES
noted.  · Nutrition-Focused Physical Findings: Appears obese; 125 ml measured stool output recorded on 10/15; +2 pitting RLE and LLE edema  · Wound Type: None  · Current Nutrition Therapies:  · Oral Diet Orders: Clear Liquid, Low Microbial   · Oral Diet intake: 1-25% (broth)  · Oral Nutrition Supplement (ONS) Orders: Clear Liquid Oral Supplement  · ONS intake: 0%  · Anthropometric Measures:  · Ht: 5' 9\" (175.3 cm)   · Current Body Wt: 261 lb (118.4 kg)  · Admission Body Wt: 265 lb (120.2 kg)  · Usual Body Wt:  (260-280 lbs per pt)  · Ideal Body Wt: 160 lb (72.6 kg),  · BMI Classification: BMI 35.0 - 39.9 Obese Class II  · Comparative Standards (Estimated Nutrition Needs):  · Estimated Daily Total Kcal: 5792-8279  · Estimated Daily Protein (g): 110-146    Estimated Intake vs Estimated Needs: Intake Less Than Needs    Nutrition Risk Level: High    Nutrition Interventions:   Continue current diet, Continue current ONS  Continued Inpatient Monitoring    Nutrition Evaluation:   · Evaluation: Goals set   · Goals: Pt will consume and tolerate at least 50% of meals/supplements offered    · Monitoring: Diet Progression, Meal Intake, Supplement Intake, Weight, Pertinent Labs, Diet Tolerance, Nausea or Vomiting, Diarrhea    See Adult Nutrition Doc Flowsheet for more detail.      Electronically signed by Jeffy Lee RD, REYES on 10/16/18 at 10:22 AM    Contact Number: [phone]

## 2018-10-16 NOTE — PLAN OF CARE
microbial diet. Will continue to monitor. Problem: Infection - Central Venous Catheter-Associated Bloodstream Infection:  Goal: Will show no infection signs and symptoms  Will show no infection signs and symptoms   Outcome: Met This Shift  CVC site remains free of signs/symptoms of infection. No drainage, edema, erythema, pain, or warmth noted at site. Dressing changes continue per protocol and on an as needed basis - see flowsheet. Compliant with [facility] Bath Protocol:  Performed CHG bath today per [facility] protocol utilizing Bed bath with CHG wipes. CVC site cleansed with CHG wipe over dressing, skin surrounding dressing, and first 6\" of IV tubing. Pt tolerated well. Continued to encourage daily CHG bathing per [facility] protocol. Problem: Venous Thromboembolism:  Goal: Will show no signs or symptoms of venous thromboembolism  Will show no signs or symptoms of venous thromboembolism   Outcome: Met This Shift  Refusing DVT Prevention: Pt is at risk for DVT d/t decreased mobility and cancer treatment. Pt educated on importance of activity. Pt has orders for SCDs while in bed, however pt currently refusing treatment. Reviewed risks of DVT & PE development while inpatient. Dr. Swapna Mitchell notified of patient's refusal and re-education of importance of prophylaxis. No new orders at this time. Will continue to re-instruct patient and intervene as appropriate. Problem: Activity:  Goal: Ability to tolerate increased activity will improve  Ability to tolerate increased activity will improve   Outcome: Ongoing      Unstable:  Due to pts condition, physical activity not encouraged by nursing at this time. Problem: Nutrition  Goal: Optimal nutrition therapy  Outcome: Met This Shift  Pt is tolerating clear liquid diet at this time. Problem: Nausea/Vomiting:  Goal: Absence of nausea/vomiting  Absence of nausea/vomiting   Outcome: Ongoing  No n/v at this time.      Problem: Risk for Impaired Skin Integrity  Goal: Tissue integrity - skin and mucous membranes  Structural intactness and normal physiological function of skin and  mucous membranes. Outcome: Ongoing  Excoriation to bottom, venelex ointment applied. Problem: Diarrhea:  Goal: Bowel elimination is within specified parameters  Bowel elimination is within specified parameters   Outcome: Met This Shift  Continues with very small episodes diarrhea at this time.      Problem: Infection - Vancomycin-Resistant Enterococci:  Goal: Absence of vancomycin-resistant Enterococci infection  Absence of vancomycin-resistant Enterococci infection   Outcome: Met This Shift

## 2018-10-16 NOTE — PROGRESS NOTES
10/16/18   0400   BILIDIR  1.8*   BILITOT  2.2*     Admission Weight: 265 lb (120.2 kg)  Current Weight: Weight: 261 lb (118.4 kg). - Cont Actigall. 7. Pulmonary:  SOB with laying flat, tachypnea  - PFTs:  decreased FEV1  - Encourage IS and ambulation   -Xopenex 0.63 mg Q 6 hours prn  -ABG 10/15:  WNL     8.  GI / Nutrition:  Poor oral intake d/t nausea and grade 3 mucositis   Acute diverticulitis:  Pain has improved  - Cont IV abx, as above   - Cont clear liquid diet   Nausea: Due to chemotherapy and neutropenia   - Cont Zofran, Compazine as needed, avoid Ativan d/t drowsiness    - Cont Maalox and PPI for indigestion   Diarrhea:  Still w/ loose BMs   - C. Diff negative (9/28/18) - Cont imodium as needed   Stomatitis, grade 3:  Difficulty w/ swallowing, but still able to get liquids and some pills down  - Hold morphine d/t mental status changes  - Cont MMW prn  - Cont Dex swish   - Cont IV meds when possible  -  TPN stopped 10/15    9. Central line:  Patient pulled lumen off on 10/15  -Unable to exchange line on 10/15 due to inability to lay flat  -Re-attempt line placement today       10. HTN:  BP stable  - Norvasc 5 mg daily Stop 10/14) and Propanolol 80 mg daily (change to 20 mg TID 10/14)   - Lasix gtt     11. Left Great Ingrown Toenail Removal:  No acute issues   - Dr. Majo Bertrand to follow as needed     12. Insomnia:  Chronic  - Avoid Ambien and Ativan d/t AMS     13.  Neuro:  Acute encephalopathy- drug induced vs metabolic encephalopathy.    - CT Head (10/5/18) - No acute findings   -Ammonia level WNL  -Uremia and hypernatremia, consult renal  - Cont to follow     14. :  Penis and scrotal pain, possibly fungal infection  - Cont Miconazole powder    15.   Cardiac:  SOB when laying flat  -ECHO:  Limited exam but appears to be preserved systolic function, no pericardia effusion      - DVT Prophylaxis: Platelets <69,453 cells/dL - prophylactic lovenox on hold and mechanical prophylaxis with bilateral SCDs

## 2018-10-16 NOTE — PLAN OF CARE
Problem: Nutrition  Goal: Optimal nutrition therapy  Outcome: Ongoing  Nutrition Problem: Increased nutrient needs  Intervention: Food and/or Nutrient Delivery: Continue current diet, Continue current ONS  Nutritional Goals: Pt will consume and tolerate at least 50% of meals/supplements offered

## 2018-10-16 NOTE — CONSULTS
Nephrology Consult Note     Patient's PCP: Isaiah Proctor MD    CC: Weakness, Fatigue, Fever  HISTORY OF PRESENT ILLNESS:    Patient is a 67 y.o. 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 Today the patient is confused and somnolent. He complianas of fever, chills, N/V. The patient denies any diarrhea, chest pain, or cough. I spoke with the son this morning and he stated that his father is not usually this disoriented. He stated that it seemed to start late last night/early this morning. Interval History: Patient had T max 103 overnight, BC positive for Enterococcus and started on Meropenem, Vancomycin, Acyclovir, Zyvox. Viral work up for EBV, CMV, BK virus pending. . Baseline BUN/Cr  28/0.6-0.9 respectively now elevated to 90/1.6 10/16/18. Past Medical / Surgical History:    Past Medical History:   Diagnosis Date    Arthritis     Cancer ([facility] [address]     AML    Dental crowns present     multiple upper and lower, plus one implant - top front middle    Enlarged prostate     Hemochromatosis     phlebotomy monthly.  no longer a problem    History of blood transfusion     Hx of blood clots 10/2017    Left groin    Hypertension     Shoulder pain, right     VRE (vancomycin resistant enterococcus) culture positive 10/02/2018     Past Surgical History:   Procedure Laterality Date    BACK SURGERY      CARPAL TUNNEL RELEASE Right     CHOLECYSTECTOMY      DENTAL SURGERY      implant top front middle    FOOT SURGERY Right     triple arthrodesis    KNEE ARTHROSCOPY Right     x 2    KNEE ARTHROSCOPY Left     KNEE ARTHROSCOPY Left 6/25/14    partial lateral menisectomy medial meniscal chondroplasty    MT INSJ TUNNELED CVC W/O SUBQ PORT/ AGE 5 YR/> Left 9/20/2018    TRIPLE LUMEN NUNO PLACEMENT UNDER FLUOROSCOPY performed by Edilia Quinonez MD at [address] [place] [address] CTR VAD W/SUBQ PORT/
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
## 2018-10-16 NOTE — CONSULTS
Full consult to follow.  Labs and notes reviewed    Thanks  Nephrology  [phone]  Office: [phone]  Cell: [phone]  Fax: [phone]

## 2018-10-17 NOTE — PROGRESS NOTES
displayed. LIVP:   Recent Labs      10/15/18   0416  10/16/18   0400  10/17/18   0250   AST  38*  50*  44*   ALT  40  48*  49*   BILIDIR  1.9*  1.8*  1.8*   BILITOT  2.5*  2.2*  2.3*   ALKPHOS  94  100  98     Uric Acid:    Recent Labs      10/17/18   0250   LABURIC  6.7     Coags:   Recent Labs      10/15/18   0416  10/16/18   0400   PROTIME  20.6*  19.1*   INR  1.81*  1.68*   APTT  37.5*  36.5*     Tacro:    Recent Labs      10/15/18   0815  10/16/18   0830   TACROLEV  15.7  13.5     CT abd/pelvis 10/02/18  1. Acute sigmoid diverticulitis. No evidence of perforation or abscess. 2. Small left pleural effusion    Microbiology:  1. Urine Culture (10/2/18):  STAPHYLOCOCCUS SIMULANS     Antibiotic Interpretation DIMITRY Unit   ceFAZolin Resistant <=4 mcg/mL   ciprofloxacin Resistant >2 mcg/mL   nitrofurantoin Sensitive <=32 mcg/mL   oxacillin Resistant >2 mcg/mL   tetracycline Sensitive <=4 mcg/mL   trimethoprim-sulfamethoxazole Resistant >2/38 mcg/mL   vancomycin Sensitive 2 mcg/mL       2. Blood culture:  VRE (10/15/18)  Culture Blood #1 [[id number]] (Abnormal) Collected: 10/15/18 0815   Order Status: Completed Specimen: Blood Updated: 10/16/18 1344    Blood Culture, Routine -- (A)    Gram stain Aerobic bottle:   Gram positive cocci in chains and/or pairs   resembling Streptococcus   Information to follow   Gram stain Anaerobic bottle:   Gram positive cocci in chains and/or pairs   resembling Streptococcus   Information to follow     Organism Enterococcus VRE(Queta/B) DNA Detected (A)         PROBLEM LIST:         1. 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 Hereditary hemochromatosis - needs post BMT phlebotomy   3. LLE DVT (11/2017)  4. Right sided chest pain  5. HTN  6. Gout    Post - Transplant Complications:  1. Nausea / Indigestion  2. Insomnia   3. Acute diverticulitis  4. Mucositis, grade 3  5. Staph Simulans UTI (10/2/18)  6. Elevated LFTs  7. DEVON  8. Acute Encephalopathy   9.   Neutropenic

## 2018-10-17 NOTE — PROGRESS NOTES
CREATININE  1.6*  1.8*  1.9*   GLUCOSE  185*  154*  156*     LFT's: Recent Labs      10/15/18   0416  10/16/18   0400  10/17/18   0250   AST  38*  50*  44*   ALT  40  48*  49*   BILITOT  2.5*  2.2*  2.3*   ALKPHOS  94  100  98     Troponin: No results for input(s): TROPONINI in the last 72 hours. BNP: No results for input(s): BNP in the last 72 hours. ABGs: Recent Labs      10/15/18   1305   PHART  7.420   LWO9ZMC  43.0   PO2ART  91.3     INR:   Recent Labs      10/15/18   0416  10/16/18   0400   INR  1.81*  1.68*     Lipids: No results for input(s): CHOL, HDL in the last 72 hours. Invalid input(s): LDLCALCU    U/A:  Recent Labs      10/15/18   0521   COLORU  Yellow   PHUR  6.0   LABCAST  0-1 Coarse Gran*   WBCUA  0-2   RBCUA  None seen   BACTERIA  3+*   CLARITYU  CLOUDY*   SPECGRAV  1.015   LEUKOCYTESUR  Negative   UROBILINOGEN  0.2   BILIRUBINUR  Negative   BLOODU  Negative   GLUCOSEU  Negative   AMORPHOUS  2+*        ASSESSMENT AND PLAN:   Patient is a 67 y.o. 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 Patient was found to have positive blood cultures for enterococcus DNA and was started on Meropenem.      DEVON: Etiology appears to be pre renal d/t fluid removal through diuresis even though the patient has had good urine output 3.9L in 24 hours. Baseline BUN/Cr 28/0.9. Expect continued elevated BUN/Cr as we continue to diurese the patient. DEVON could also have other factorials such as the patient receiving Tacrolimus, and Acyclovir both know to possibly cause nephrotoxicity. Patient had 3L output yesterday but is still positive 2L.  Will remove 2L today at dialysis  -Stopped Lasix   -Place [facility]  -Strict I/O  -Renal panel q12 hours   -Repeat Echo as limited previous study: to rule out any cardio renal syndrome  -Serum Uric Acid  -Microalbumin/Cr urine ratio       Azotemia:  on

## 2018-10-17 NOTE — PLAN OF CARE
Problem: Falls - Risk of:  Goal: Will remain free from falls  Will remain free from falls   Outcome: Ongoing  + High Fall Risk per WRIGHT/ABCDS: Explained fall risk precautions to pt and rationale behind their use to keep the patient safe. Pt bed is in low position, side rails up, call light and belongings are in reach. Fall wristband applied and present on pts wrist.  Bed alarm on. Video monitor and close supervision in place. Problem: Pain:  Goal: Pain level will decrease  Pain level will decrease   Outcome: Ongoing  No complaints of pain at this time. Problem: Bleeding:  Goal: Will show no signs and symptoms of excessive bleeding  Will show no signs and symptoms of excessive bleeding   Outcome: Ongoing  Patient's hemoglobin this AM:   Recent Labs      10/17/18   0250   HGB  6.9*     Patient's platelet count this AM:   Recent Labs      10/17/18   0250   PLT  7*    Thrombocytopenia Precautions in place. Patient showing no signs or symptoms of active bleeding. Patient transfused blood products per orders - see flowsheet. Patient verbalizes understanding of all instructions. Will continue to assess and implement POC. Call light within reach and hourly rounding in place. Problem: PROTECTIVE PRECAUTIONS  Goal: Patient will remain free of nosocomial Infections  Outcome: Ongoing  Protective precautions in place. Problem: Infection - Central Venous Catheter-Associated Bloodstream Infection:  Goal: Will show no infection signs and symptoms  Will show no infection signs and symptoms   Outcome: Ongoing  CVC site remains free of signs/symptoms of infection. No drainage, edema, erythema, pain, or warmth noted at site. Dressing changes continue per protocol and on an as needed basis - see flowsheet. Compliant with [facility] Bath Protocol:  Performed CHG bath today per [facility] protocol utilizing Bed bath with CHG wipes.   CVC site cleansed with CHG wipe over dressing, skin surrounding dressing, and first 6\" of IV

## 2018-10-18 NOTE — PLAN OF CARE
with CHG wipes. CVC site cleansed with CHG wipe over dressing, skin surrounding dressing, and first 6\" of IV tubing. Pt tolerated well. Continued to encourage daily CHG bathing per [facility] protocol. Problem: Discharge Planning:  Goal: Discharged to appropriate level of care  Discharged to appropriate level of care   Outcome: Ongoing  Will continue with treatment plan. Wife verbalize understanding. Problem: Nausea/Vomiting:  Goal: Absence of nausea/vomiting  Absence of nausea/vomiting   Outcome: Met This Shift  Pt denies nausea at this time. Pt has mucositis with thick secretions. Pt spit up saliva at times. Will monitor. Problem: Risk for Impaired Skin Integrity  Goal: Tissue integrity - skin and mucous membranes  Structural intactness and normal physiological function of skin and  mucous membranes. Outcome: Ongoing  Pt continues with skin breakdown on coccyx and groin scrotum area. applied venelex cream and micafungin powder to areas. Pt repositioned with pillow support. Pt has bilateral lower extremity edema. Problem: Diarrhea:  Goal: Bowel elimination is within specified parameters  Bowel elimination is within specified parameters   Outcome: Ongoing  Pt had one episode of diarrhea. Pt is cdiff negative. Will monitor. Problem: Infection - Vancomycin-Resistant Enterococci:  Goal: Absence of vancomycin-resistant Enterococci infection  Absence of vancomycin-resistant Enterococci infection   Outcome: Ongoing  Pt continues in isolation for vre.

## 2018-10-18 NOTE — PROGRESS NOTES
WBC  0.1*  0.1*   --   0.2*   HGB  7.3*  6.9*   --   7.6*   HCT  20.3*  19.8*   --   20.8*   PLT  6*  7*  9*  19*       BMP:   Recent Labs      10/17/18   0839  10/17/18   1931  10/18/18   0329   NA  147*  143  143   K  3.4*  3.4*  3.4*   CL  105  102  104   CO2  25  26  26   BUN  108*  78*  92*   CREATININE  1.9*  1.4*  1.7*   GLUCOSE  156*  166*  186*     LFT's:   Recent Labs      10/16/18   0400  10/17/18   0250  10/18/18   0329   AST  50*  44*  33   ALT  48*  49*  36   BILITOT  2.2*  2.3*  2.2*   ALKPHOS  100  98  98     Troponin: No results for input(s): TROPONINI in the last 72 hours. BNP: No results for input(s): BNP in the last 72 hours. ABGs:   Recent Labs      10/15/18   1305   PHART  7.420   [id number]  43.0   PO2ART  91.3     INR:   Recent Labs      10/16/18   0400  10/18/18   0329   INR  1.68*  1.69*     Lipids: No results for input(s): CHOL, HDL in the last 72 hours. Invalid input(s): LDLCALCU    U/A:  No results for input(s): NITRITE, COLORU, PHUR, LABCAST, WBCUA, RBCUA, MUCUS, TRICHOMONAS, YEAST, BACTERIA, CLARITYU, SPECGRAV, LEUKOCYTESUR, UROBILINOGEN, BILIRUBINUR, BLOODU, GLUCOSEU, AMORPHOUS in the last 72 hours.     Invalid input(s): Camilla Tom     ASSESSMENT AND PLAN:     Code Status: Full  FEN: Low MIcrobial  PPX: SCD  DISPO: [facility]      Thank you  Nephrology  Bam Penny [address] [facility], [address]  Fax: [phone]

## 2018-10-18 NOTE — PLAN OF CARE
Problem: Falls - Risk of:  Goal: Will remain free from falls  Will remain free from falls   Outcome: Ongoing  Pt high fall risk, RACHAEL on, camera in use, sitter in room. Problem: Pain:  Goal: Pain level will decrease  Pain level will decrease   Outcome: Ongoing  Pt denies pain at this time    Problem: PROTECTIVE PRECAUTIONS  Goal: Patient will remain free of nosocomial Infections  Outcome: Ongoing   Protective precautions followed this shift     Problem: Infection - Central Venous Catheter-Associated Bloodstream Infection:  Goal: Will show no infection signs and symptoms  Will show no infection signs and symptoms   Outcome: Ongoing  CVC site remains free of signs/symptoms of infection. No drainage, edema, erythema, pain, or warmth noted at site. Dressing changes continue per protocol and on an as needed basis - see flowsheet. Problem: Venous Thromboembolism:  Goal: Will show no signs or symptoms of venous thromboembolism  Will show no signs or symptoms of venous thromboembolism   Outcome: Ongoing      Problem: Nutrition  Goal: Optimal nutrition therapy  Outcome: Ongoing  Pt on clear liquid diet    Problem: Nausea/Vomiting:  Goal: Absence of nausea/vomiting  Absence of nausea/vomiting   Outcome: Ongoing  No nausea or vomiting this shift, will monitor    Problem: Risk for Impaired Skin Integrity  Goal: Tissue integrity - skin and mucous membranes  Structural intactness and normal physiological function of skin and  mucous membranes.    Outcome: Ongoing  Pt has excoriation on buttocks and reddened trav area, will monitor

## 2018-10-19 NOTE — PROGRESS NOTES
cetirizine, [COMPLETED] loperamide **FOLLOWED BY** loperamide, sodium chloride, simethicone, aluminum & magnesium hydroxide-simethicone, ondansetron, ondansetron, bisacodyl, sodium chloride, alteplase, magnesium hydroxide, magnesium sulfate, potassium chloride, Saline Mouthwash, prochlorperazine **OR** prochlorperazine    Allergies: Allergies   Allergen Reactions    Tegaderm Ag Mesh 2\"X2\" [Wound Dressings] Rash    Voriconazole Other (See Comments)     Hallucinations    Keflex [Cephalexin]      Irregular heartbeat and blood in urine       PHYSICAL EXAM:       Vitals: /63   Pulse 71   Temp 96.9 °F (36.1 °C) (Axillary)   Resp 18   Ht 5' 9\" (1.753 m)   Wt 283 lb 1.1 oz (128.4 kg)   SpO2 94%   BMI 41.80 kg/m²     I/O:      Intake/Output Summary (Last 24 hours) at 10/19/18 1246  Last data filed at 10/19/18 0553   Gross per 24 hour   Intake             2240 ml   Output             3460 ml   Net            -1220 ml     No intake/output data recorded. I/O last 3 completed shifts: In: 7804 [I.V.:1380; Blood:260]  Out: 0555 [Urine:600; Stool:60]    Physical Examination:   · General appearance: Appears comfortable at rest, lethargic   · HEENT: Atraumatic, normocephalic, moist mucus membranes   · Respiratory: bilateral wheezing   · Cardiovascular: regular S1/S2, with no Murmur, rub or gallop. No JVD  · Abdomen: Soft, non-tender, non-distended  · Musculoskeletal: No clubbing, cyanosis, 3+ lower extremity edema, peripheral pulses present, cap refill < 2sec  · Neurologic: unable to assess. No results for input(s): PHART, XMH2VQB, PO2ART in the last 72 hours.     DATA:       Labs  CBC:   Recent Labs      10/17/18   0250  10/17/18   1110  10/18/18   0329  10/19/18   0230   WBC  0.1*   --   0.2*  0.2*   HGB  6.9*   --   7.6*  7.1*   HCT  19.8*   --   20.8*  19.8*  19.7*   PLT  7*  9*  19*  7*       BMP:   Recent Labs      10/18/18   0329  10/18/18   2015  10/19/18   0230   NA  143  144  141   K  3.4*  3.5  3.7

## 2018-10-19 NOTE — PLAN OF CARE
Problem: Falls - Risk of:  Goal: Will remain free from falls  Will remain free from falls   Outcome: Ongoing  + High Fall Risk per WRIGHT/ABCDS: Explained fall risk precautions to pt and family and rationale behind their use to keep the patient safe. Pt bed is in low position, side rails up, call light and belongings are in reach. Fall wristband applied and present on pts wrist.  Bed alarm on. Pt encouraged to call for assistance. Will continue with hourly rounds for PO intake, pain needs, toileting and repositioning as needed.      Problem: Pain:  Goal: Pain level will decrease  Pain level will decrease   Outcome: Ongoing   Pt says no when asking if he is in pain.      Problem: Bleeding:  Goal: Will show no signs and symptoms of excessive bleeding  Will show no signs and symptoms of excessive bleeding   Outcome: Ongoing     Patient's hemoglobin this AM:   Recent Labs      10/18/18   0329   HGB  7.6*     Patient's platelet count this AM:   Recent Labs      10/18/18   0329   PLT  19*    Thrombocytopenia Precautions in place. Patient showing no signs or symptoms of active bleeding. Transfusion not indicated at this time. Patient verbalizes understanding of all instructions. Will continue to assess and implement POC. Call light within reach and hourly rounding in place.      Problem: PROTECTIVE PRECAUTIONS  Goal: Patient will remain free of nosocomial Infections  Outcome: Ongoing   Pt currently in a private, positive pressure room. Educated pt on wearing a mask when neutropenic and/or leaving the floor. No living plants or flowers allowed. Also reinforced importance of hand hygiene. Pt following a low microbial diet.       Problem: Infection - Central Venous Catheter-Associated Bloodstream Infection:  Goal: Will show no infection signs and symptoms  Will show no infection signs and symptoms   Outcome: Ongoing  [address] [facility] site remains free of signs/symptoms of infection.  No drainage, edema, erythema, pain, or warmth

## 2018-10-19 NOTE — PROGRESS NOTES
Occupational Therapy / Physical Therapy   Hold Note     Attempted to see pt this pm. Pt per RN not able to participate in therapy this date and scheduled for multiple tests/ procedures this pm. Will follow up per plan of care. Rn aware.      Meaghan Coles OTR/L  [place], [place]

## 2018-10-19 NOTE — PROGRESS NOTES
grade 3  5. Staph Simulans UTI (10/2/18)  6. Elevated LFTs  7. DEVON  8. Acute Encephalopathy   9. Neutropenic Fever  10. VRE bacteremia      TREATMENT:        1. Vidaza x 1 cycle (10/30/17)   2. Vyxeos (12/30/2017  3. Intermediate Dose Anna-C x 3 cycles (2/2018 - 4/2018)  4. MUD Allo-pbpc BMT (ProTmune protocol)     ASSESSMENT AND PLAN:        1. AML:  Currently in CR  - S/p MUD Allo-pbpc BMT (ProTmune protocol)      Preparative Regimen: Melphalan and Fludarabine  Date of BMT:  9/27/18  Source of stem cells: PBPC / 6.08x10^6 [id number]/kg  Donor/Recipient Blood Type:  B Positive / B Positive  Donor Sex:  Male / NMDP (DID# [id number])   CMV Donor / Recipient: Negative / Negative      Day + 21    Per study, patient will need BM bx performed on D+21 (10/18/18) if not engrafted. BM needs to be sent for Pathology, Flow and Cytogenetics     2. ID:  VRE bacteremia, afebrile currently  -Blood culture 10/15: + VRE  -Repeat [facility] 10/18  - cont treatment for acute diverticulitis  - S/p treatment for Staph UTI, Urine cx (10/2/18) - Staph Simulans  - CT a/p (10/2/18) - Acute sigmoid diverticulitis  - Fungitel and galactomannan (10/12/18) - Neg  - Cont IV Acyclovir prophylaxis  - Micafungin (tx dose) Day +7  - Continue linezolid D3 (10/16)  - Merrem Day + 14, change to renal dosing today  -Viral PCR 10/11/18:  EBV, CMV, HHV6, BK negative  -CXray neg (10/15)    Abx History:  Zosyn Day + 4 (stopped 10/5/18)  IV Vanco x 6 days (d/c 10/09/18)  Vanco x 5 days (d/c 10/16)     3.   Heme:  Chemotherapy induced pancytopenia  - Hives w/ platelets; pre-medicate w/ benadryl prior   - H/o hereditary hemochromatosis - needs post BMT phlebotomy   - Transfuse for Hgb < 7 and Platelets < 70M.    - 1 hour post platelet count non-sufficient increase:  PRA negative  - Elevated PT/INR:  Vit K 10 mg IV 10/15  - Continue G-CSF daily 10/15 with multiple complications  - Haptoglobin WNL, LDH (slightly elevated), VILMA negative(10/16)    - prolonged

## 2018-10-19 NOTE — PROGRESS NOTES
Metabolic:  Elevated creatinine, azotemia, hypernatremia, hyperPhos. Good UOP but still positive 2 L  - Consult Renal Anrdea Solano), appreciate recs  - Stop D5W @ 50 cc/hr  - Stopped TPN 10/15 d/t uremia  - Replace potassium and magnesium per policy. 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 Follow closely. Stable 10/13 -10/14/18  - S/p MTX per ProTmume protocol: 15 mg/m2 on day + 1;  MTX 10 mg/m2 on day + 3, 6. (did not receive day + 11 d/t mucositis)  - Prograf 0.5 mg BID (decreased d/t elevated Cr), monitor levels daily - stable; needs adequate GVH prophylaxis     Tacro Level:  Lab Results   Component Value Date    TACROLEV 12.8 10/18/2018    TACROLEV 13.8 10/17/2018    TACROLEV 13.5 10/16/2018     6. VOD:  No evidence of VOD, but T.bili elevated but stalbe. No RUQ pain or hepatomegaly. - ABD US w/ dopplers (10/5/18) - Negative w/ normal flow    -Repeat Abd US w dopplers (10/15/18)- limited exam but negative with normal flow    Recent Labs      10/19/18   0230   BILIDIR  1.8*   BILITOT  2.7*     Admission Weight: 265 lb (120.2 kg)  Current Weight: Weight: 283 lb 1.1 oz (128.4 kg). - Cont Actigall. 7. Pulmonary:  SOB with laying flat, tachypnea  - PFTs:  decreased FEV1  - Encourage IS and ambulation   -Xopenex 0.63 mg Q 6 hours prn  -ABG 10/15:  WNL     8.  GI / Nutrition:  Poor oral intake d/t nausea and grade 3 mucositis   Acute diverticulitis:  Pain has improved  - Cont IV abx, as above   Nausea: Due to chemotherapy and neutropenia   - Cont Zofran, Compazine as needed, avoid Ativan d/t drowsiness    - Cont Maalox and PPI for indigestion   Diarrhea:  Still w/ loose BMs   - C.  Diff negative (9/28/18) - Cont imodium as needed   Stomatitis, grade 3:  Difficulty w/ swallowing, but still able to get liquids and some pills down  - Hold morphine d/t mental status changes  - Cont MMW prn  - Cont IV meds when possible  -  TPN stopped 10/15, continue to monitor nutritional needs daily. Unable to restart TPN d/t elevated LFTs/azotemia. Severe mucositis prevents TF, but may need to revisit TPN here  Buttocks skin breakdown:  -Consult WOCN, clean area frequently    - difficult to use TPN, given + cultures and elev liver; although it can be attempted; alternate is DOBhoff, placed with plt transfusion (two catheters placed and one removed in similar situation, without bleeding)    9. Central line: Addressed by IR     10. HTN:  BP stable  - Norvasc 5 mg daily Stop 10/14) and Propanolol 80 mg daily (change to 20 mg TID 10/14)   - Lasix gtt     11. Left Great Ingrown Toenail Removal:  No acute issues   - Dr. Emily Ronquillo to follow as needed     12. Insomnia:  Chronic  - Avoid Ambien and Ativan d/t AMS     13.  Neuro:  Acute encephalopathy- drug induced vs metabolic encephalopathy vs infection (bacteremia). Multifactorial   - CT Head (10/5/18) - No acute findings   -Ammonia level WNL  -Azotemia and hypernatremia, consult renal  - MRI brain today, if able to be accomplished    14. :  Penis and scrotal pain, possibly fungal infection  - Cont Miconazole powder    15. Cardiac:  SOB when laying flat  -ECHO:  Limited exam but appears to be preserved systolic function, no pericardia effusion      - DVT Prophylaxis: Platelets <41,290 cells/dL - prophylactic lovenox on hold and mechanical prophylaxis with bilateral SCDs while in bed in place. Contraindications to pharmacologic prophylaxis: Thrombocytopenia  Contraindications to mechanical prophylaxis: None    - Disposition:  Home once blood counts recover and toxicities resolved following allogeneic transplant        Rl Burns DO, MS

## 2018-10-20 NOTE — FLOWSHEET NOTE
HD complete. VSS throughout treatment. Rinseback per protocol. All blood returned. 3.0 L net fluid removed. 1 unit PRBC's given. No s/s of complications r/t Ultrafiltration, HD, or blood product. RCW vascath functions well, prescribed BFR achieved and maintained throughout treatment without pressure alarms. No medications given. Heparin to dwell via RCW vascath x 2 lumens. Report given. Safe patient handoff achieved.   NUVIA Wagner, RN       10/20/18 0500 10/20/18 0645 10/20/18 0815   Vital Signs   Temp 97.7 °F (36.5 °C) 97.6 °F (36.4 °C) 97.3 °F (36.3 °C)   Temp Source Axillary Axillary Axillary   Pulse 76 81 88   Heart Rate Source Monitor Monitor Monitor   Resp 22 20 20   /62 107/61 116/62   BP Location Left Arm Left Arm Left Arm   BP Upper/Lower Upper Upper Upper   MAP (mmHg) 90 78 80   Patient Position --  Lying left side Semi fowlers   Level of Consciousness --  --  0   MEWS Score --  --  1   Patient Currently in Pain --  Denies  (No s/s of complications r/t blood tranfusion) Denies   Height and Weight   Weight 281 lb 1.4 oz (127.5 kg) --  274 lb 4 oz (124.4 kg)   Weight Method Bed scale --  Bed scale   BMI (Calculated) 41.6 --  40.6   Oxygen Therapy   SpO2 95 % 95 % 96 %   Pulse Oximeter Device Mode Intermittent --  Intermittent   Pulse Oximeter Device Location Finger --  Finger   O2 Device Nasal cannula --  Nasal cannula   O2 Flow Rate (L/min) 2 L/min --  2 L/min

## 2018-10-20 NOTE — PLAN OF CARE
Problem: Falls - Risk of:  Goal: Will remain free from falls  Will remain free from falls   Outcome: Ongoing  Pt is a high fall risk d/t . See Shanti Furry Fall Score. Bed/chair alarm in use. Pt bed is in low position, side rails up, call light and belongings are in reach. Pt encouraged to call for assistance, pt calling appropriately. Will continue with hourly rounds for po intake, pain needs, toileting and repositioning as needed.         Problem: Pain:  Goal: Pain level will decrease  Pain level will decrease   Outcome: Ongoing  Pt unable to verbalize pain at this time, pt confused and disoriented but able to rest the majority of the day. No pain medication given.       Problem: Bleeding:  Goal: Will show no signs and symptoms of excessive bleeding  Will show no signs and symptoms of excessive bleeding   Outcome: Ongoing  Patient's hemoglobin this AM:   Recent Labs      10/20/18   0300   HGB  5.9*     Patient's platelet count this AM:   Recent Labs      10/20/18   0300   PLT  8*    Thrombocytopenia Precautions in place. Patient showing no signs or symptoms of active bleeding. Patient transfused blood products per orders - see flowsheet. Patient verbalizes understanding of all instructions. Will continue to assess and implement POC. Call light within reach and hourly rounding in place.      Problem: PROTECTIVE PRECAUTIONS  Goal: Patient will remain free of nosocomial Infections  Outcome: Ongoing  Pt remains in protective precautions. Pt educated on wearing mask when in hallways. Pt, staff, and visitors adhering to handwashing guidelines. Pt educated to shower or bathe daily with chlorhexidine and linens changed daily per protocol. Pt verbalizes understanding of low microbial diet.  Will continue to monitor.      Problem: Infection - Central Venous Catheter-Associated Bloodstream Infection:  Goal: Will show no infection signs and symptoms  Will show no infection signs and symptoms   Outcome: Ongoing  CVC site remains Outcome: Ongoing  Pt skin assessed this shift. Pt oral mucositis improving, pt noted to have redness and swelling but no open sores, pt with thick ropey secretions at this time. Pt noted to have excoriation to buttocks and trav area. Venelex cream and barrier cream applied to buttocks. Miconazole powder applied to trav area. Pt with +3 pitting BLE edema noted. Pt repositioned as much as pt would tolerate. Pt refusing elevation of legs or heels at this time, but RN was able to get pt to reposition himself off of his buttocks      Problem: Infection - Vancomycin-Resistant Enterococci:  Goal: Absence of vancomycin-resistant Enterococci infection  Absence of vancomycin-resistant Enterococci infection   Outcome: Ongoing  Patient remains in isolation for VRE. Contact isolation maintained per policy, reminded patient the importance of hand washing. Patient verbalized understanding. Will continue to monitor.      Problem: Mental Status - Impaired:  Intervention: Assess signs and symptoms of altered mental status  Pt continues to be confused and disoriented throughout shift. Pt continues to pull at lines when awake and removes oxygen frequently. Pt given one time dose of haldol prior to MRI, pt tolerated well and was able to stay settled during bmbx this afternoon.

## 2018-10-20 NOTE — PROGRESS NOTES
[facility] All[facility] Progress Note    10/20/2018    Corrinne Meyers    :  1945    MRN:  [id number]    Referring MD: Toño Stein MD, PhD    Subjective: Delirium continues; HD the past two days; combative with oral meds    KPS:  50%     Isolation:  VRE / Contact    Medications    Scheduled Meds:   sodium chloride  250 mL Intravenous Once    tacrolimus  0.5 mg Oral BID    furosemide  80 mg Intravenous Once    haloperidol lactate  5 mg Intravenous Once    meropenem  500 mg Intravenous Q24H    sodium chloride  250 mL Intravenous Once    acyclovir  350 mg Intravenous Q24H    linezolid  600 mg Intravenous Q12H    tbo-filgrastim  300 mcg Subcutaneous QPM    propranolol  20 mg Oral TID    insulin regular  0-10 Units Subcutaneous Q6H    micafungin (MYCAMINE) IVPB  150 mg Intravenous Daily    VENELEX   Topical BID    pantoprazole  40 mg Intravenous Daily    And    sodium chloride (PF)  10 mL Intravenous Daily    miconazole   Topical BID    Saline Mouthwash  15 mL Swish & Spit 4x Daily AC & HS    sodium chloride flush  10 mL Intravenous 2 times per day    ursodiol  250 mg Oral 4x Daily AC & HS     Continuous Infusions:   dextrose      sodium chloride 20 mL/hr at 10/18/18 0627    sodium chloride 20 mL/hr at 10/19/18 1632     PRN Meds:haloperidol lactate, albumin human, sodium chloride, heparin (porcine), haloperidol lactate, levalbuterol, dextrose, glucose, dextrose, glucagon (rDNA), dextrose, diphenhydrAMINE, medicated lip ointment, magic (miracle) mouthwash, acetaminophen, cetirizine, [COMPLETED] loperamide **FOLLOWED BY** loperamide, sodium chloride, simethicone, aluminum & magnesium hydroxide-simethicone, ondansetron, ondansetron, bisacodyl, sodium chloride, alteplase, magnesium hydroxide, magnesium sulfate, potassium chloride, Saline Mouthwash, prochlorperazine **OR** prochlorperazine    ROS:  See above, remainder of 10 point ROS is negative     Physical Exam:    I&O: Intake/Output Summary (Last 24 hours) at 10/20/18 1011  Last data filed at 10/20/18 0829   Gross per 24 hour   Intake             1000 ml   Output             7275 ml   Net            -6275 ml       Vital Signs:  /67   Pulse 82   Temp 97.4 °F (36.3 °C) (Axillary)   Resp 20   Ht 5' 9\" (1.753 m)   Wt 274 lb 4 oz (124.4 kg)   SpO2 98%   BMI 40.50 kg/m²     Weight:    Wt Readings from Last 3 Encounters:   10/20/18 274 lb 4 oz (124.4 kg)   08/14/18 266 lb 12.1 oz (121 kg)   10/09/14 294 lb (133.4 kg)       General: alert, ill appearing, delirius    HEENT: normocephalic, alopecia, PERRL, no scleral erythema or icterus, Oral mucosa w/ diffuse erythema, ulcerations and edema   NECK: supple without palpable adenopathy  BACK: Straight  SKIN: erythematous, sand paper like rash on face and ant superior chest.   CHEST: Rhonchi throughout  CV: Normal S1 S2, RRR, no MRG  ABD: NT, ND, no rebound or guarding, normoactive BS, no palpable masses or hepatosplenomegaly  M/S:  no erythema   EXTREMITIES: 2+ BLE edema, denies calf tenderness  NEURO: CN II - XII grossly intact  :  Pain and erythema of penis and scrotum, perianal areas of skin breakdown   CATHETER: Left TLH ([facility], 9/20/18) - erythema and excoriation from previous bandage    Data:   CBC:   Recent Labs      10/18/18   0329  10/19/18   0230  10/20/18   0300   WBC  0.2*  0.2*  0.2*   HGB  7.6*  7.1*  5.9*   HCT  20.8*  19.8*  19.7*  16.2*   MCV  81.4  81.3  80.4   PLT  19*  7*  8*     BMP/Mag:  Recent Labs      10/18/18   0329   10/19/18   0230  10/19/18   1608  10/20/18   0300   NA  143   < >  141  144  143  141   K  3.4*   < >  3.7  3.6  3.5  3.5   CL  104   < >  101  104  105  103   CO2  26   < >  25  26  26  26   PHOS  6.0*  6.0*   < >  5.3*  5.7*  4.4   BUN  92*   < >  75*  89*  68*  67*   CREATININE  1.7*   < >  1.5*  1.8*  1.5*  1.5*   MG  1.80   --   1.80   --   1.80    < > = values in this interval not displayed.      LIVP:   Recent Labs Vidaza x 1 cycle (10/30/17)   2. Vyxeos (12/30/2017  3. Intermediate Dose Anna-C x 3 cycles (2/2018 - 4/2018)  4. MUD Allo-pbpc BMT ([facility] protocol)     ASSESSMENT AND PLAN:        1. AML:  Currently in CR  - S/p MUD Allo-pbpc BMT ([facility] protocol)      Preparative Regimen: Melphalan and Fludarabine  Date of BMT:  9/27/18  Source of stem cells: PBPC / 6.08x10^6 [id number]/kg  Donor/Recipient Blood Type:  B Positive / B Positive  Donor Sex:  Male / NMDP (DID# [id number])   CMV Donor / Recipient: Negative / Negative      Day + 23: BMBx, another attempt today    Per study, patient will need BM bx performed on D+21 (10/18/18) if not engrafted; difficult to accomplish yesterday due to clinical change in mental status. BM needs to be sent for Pathology, Flow and Cytogenetics     2. ID:  VRE bacteremia, afebrile currently  -Blood culture 10/15: + VRE  -Repeat [facility] 10/18  - cont treatment for acute diverticulitis  - S/p treatment for Staph UTI, Urine cx (10/2/18) - Staph Simulans  - CT a/p (10/2/18) - Acute sigmoid diverticulitis  - Fungitel and galactomannan (10/12/18) - Neg  - Cont IV Acyclovir prophylaxis  - Micafungin (tx dose) Day +9  - Continue linezolid D 5 (10/16)  - Merrem Day + 16, continue until engraftment of neutrophils  -Viral PCR 10/11/18:  EBV, CMV, HHV6, BK negative  -CXray neg (10/15)    Abx History:  Zosyn Day + 4 (stopped 10/5/18)  IV Vanco x 6 days (d/c 10/09/18)  Vanco x 5 days (d/c 10/16)     3.   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    - 1 hour post platelet count non-sufficient increase:  PRA negative  - Elevated PT/INR:  Vit K 10 mg IV 10/15  - Continue G-CSF daily 10/15 with multiple complications  - Haptoglobin WNL, LDH (slightly elevated), VILMA negative(10/16)    - prolonged coags, elevated ddimer suggest DIC - have to think about TTP/HUS; [id number] obtained     4. Metabolic:  Elevated creatinine, azotemia, hypernatremia, hyperPhos. Good UOP but still positive 2 L  - Consult Renal Kaykay Bernard), appreciate recs  - Stop D5W @ 50 cc/hr  - Stopped TPN 10/15 d/t uremia  - Replace potassium and magnesium per policy. 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 Follow closely. Stable 10/13 -10/14/18  - S/p MTX per [facility] protocol: 15 mg/m2 on day + 1;  MTX 10 mg/m2 on day + 3, 6. (did not receive day + 11 d/t mucositis)  - Prograf 0.5 mg BID (decreased d/t elevated Cr), monitor levels daily - stable; needs adequate GVH prophylaxis     Tacro Level:  Lab Results   Component Value Date    TACROLEV 10.4 10/19/2018    TACROLEV 12.8 10/18/2018    TACROLEV 13.8 10/17/2018     6. VOD:  No evidence of VOD, but T.bili elevated but stalbe. No RUQ pain or hepatomegaly. - ABD US w/ dopplers (10/5/18) - Negative w/ normal flow    -Repeat Abd US w dopplers (10/15/18)- limited exam but negative with normal flow    Recent Labs      10/20/18   0300   BILIDIR  1.5*   BILITOT  2.6*     Admission Weight: 265 lb (120.2 kg)  Current Weight: Weight: 274 lb 4 oz (124.4 kg). - Cont Actigall. 7. Pulmonary:  SOB with laying flat, tachypnea  - PFTs:  decreased FEV1  - Encourage IS and ambulation   -Xopenex 0.63 mg Q 6 hours prn  -ABG 10/15:  WNL     8.  GI / Nutrition:  Poor oral intake d/t nausea and grade 3 mucositis   Acute diverticulitis:  Pain has improved  - Cont IV abx, as above   Nausea: Due to chemotherapy and neutropenia   - Cont Zofran, Compazine as needed, avoid Ativan d/t drowsiness    - Cont Maalox and PPI for indigestion   Diarrhea:  Still w/ loose BMs   - C.  Diff negative (9/28/18) - Cont imodium as needed   Stomatitis, grade 3:  Difficulty w/ swallowing, but still able to get liquids and some pills down  -

## 2018-10-20 NOTE — PLAN OF CARE
monitor. Problem: Infection - Central Venous Catheter-Associated Bloodstream Infection:  Goal: Will show no infection signs and symptoms  Will show no infection signs and symptoms   Outcome: Ongoing  CVC site remains free of signs/symptoms of infection. No drainage, edema, erythema, pain, or warmth noted at site. Dressing changes continue per protocol and on an as needed basis - see flowsheet. Compliant with [facility] Bath Protocol:  Performed CHG bath today per [facility] protocol utilizing Bed bath with CHG wipes. CVC site cleansed with CHG wipe over dressing, skin surrounding dressing, and first 6\" of IV tubing. Pt tolerated well. Continued to encourage daily CHG bathing per [facility] protocol. Problem: Discharge Planning:  Goal: Discharged to appropriate level of care  Discharged to appropriate level of care   Outcome: Ongoing  Pt deposition unknown at this time.  Will continue to monitor,

## 2018-10-20 NOTE — FLOWSHEET NOTE
10/19/18 1754 10/19/18 2100 10/19/18 2115   Treatment   Time On 1754 --  --    Time Off --  2100 --    Vital Signs   BP (!) 96/51 (!) 95/57 (!) 108/56   Weight 287 lb 4.2 oz (130.3 kg) 280 lb 10.3 oz (127.3 kg) --    Weight Method Actual;Bed scale Actual;Bed scale --    Treatment Initiation   Dialyze Hours 3  (1 hr UF, 2 hr HD) --  --    Dialysis Bath   K+ (Potassium) 4 --  --    Ca+ (Calcium) 2.25 --  --    Na+ (Sodium) 140 --  --    HCO3 (Bicarb) 35 --  --    Hemodialysis (linkable) Internal jugular Right Neck   Placement Date: 10/17/18   Access Type: Internal jugular  Orientation: Right  Access Location: Neck   Site Assessment --  Clean;Dry; Intact --    Access Interventions --  Aseptic Technique --    Dressing Status --  Clean;Dry; Intact --        Treatment time: 3 hrs (1 hr UF, 2hrs HD)    Net UF: 3 liters    Pre weight: 130.3 kg bed scale  Post weight: 127.3 kg bed scale  EDW: TBD (Acute pt)    Access used: [address]  Access function: No issues noted    Medications or blood products given: Albumin 12.5 gm IV x 3 doses for blood pressure support for fluid removal, Heparin dwells    Regular outpatient schedule: TBD (Acute pt)    Summary of response to treatment: Pt tolerated tx and fluid removal with medication administration. Copy of dialysis treatment record placed in chart, to be scanned into EMR.

## 2018-10-20 NOTE — PROGRESS NOTES
Diabetes Child        Scheduled Meds:   sodium chloride 250 mL Once   tacrolimus 0.5 mg BID   furosemide 80 mg Once   haloperidol lactate 5 mg Once   meropenem 500 mg Q24H   sodium chloride 250 mL Once   acyclovir 350 mg Q24H   linezolid 600 mg Q12H   tbo-filgrastim 300 mcg QPM   propranolol 20 mg TID   insulin regular 0-10 Units Q6H   micafungin (MYCAMINE) IVPB 150 mg Daily   VENELEX  BID   pantoprazole 40 mg Daily   And     sodium chloride (PF) 10 mL Daily   miconazole  BID   Saline Mouthwash 15 mL 4x Daily AC & HS   sodium chloride flush 10 mL 2 times per day   ursodiol 250 mg 4x Daily AC & HS      Continuous Infusion:   dextrose    sodium chloride Last Rate: 20 mL/hr at 10/18/18 1362   sodium chloride Last Rate: 20 mL/hr at 10/19/18 1632      PRN Meds:  haloperidol lactate 2 mg PRN   albumin human 12.5 g PRN   sodium chloride 100 mL PRN   heparin (porcine) 3,200 Units PRN   haloperidol lactate 0.5 mg Q6H PRN   levalbuterol 0.63 mg Q6H PRN   dextrose 12.5 g PRN   glucose 15 g PRN   dextrose 12.5 g PRN   glucagon (rDNA) 1 mg PRN   dextrose 100 mL/hr PRN   diphenhydrAMINE 12.5 mg PRN   medicated lip ointment  PRN   magic (miracle) mouthwash 5 mL 4x Daily PRN   acetaminophen 650 mg Q4H PRN   cetirizine 10 mg Daily PRN   loperamide 2 mg PRN   sodium chloride  Continuous PRN   simethicone 80 mg Q6H PRN   aluminum & magnesium hydroxide-simethicone 30 mL Q6H PRN   ondansetron 8 mg Q8H PRN   ondansetron 8 mg Q8H PRN   bisacodyl 10 mg Daily PRN   sodium chloride  Continuous PRN   alteplase 2 mg PRN   magnesium hydroxide 10 mL Daily PRN   magnesium sulfate 4 g PRN   potassium chloride 20 mEq PRN   Saline Mouthwash 15 mL Q4H PRN   prochlorperazine 10 mg Q4H PRN   Or     prochlorperazine 10 mg Q4H PRN       ALLERGIES  Tegaderm ag mesh 2\"x2\" [wound dressings];  Voriconazole; and Keflex [cephalexin]    Diet: DIET TUBE FEED CONTINUOUS/CYCLIC NPO; Renal Formula; Nasogastric; Continuous; 10; 10; 24          REVIEW OF SYSTEMS:

## 2018-10-21 NOTE — PROGRESS NOTES
prochlorperazine    ROS:  See above, remainder of 10 point ROS is negative     Physical Exam:    I&O:      Intake/Output Summary (Last 24 hours) at 10/21/18 0931  Last data filed at 10/21/18 0816   Gross per 24 hour   Intake              280 ml   Output              300 ml   Net              -20 ml       Vital Signs:  /71   Pulse 103   Temp 98.2 °F (36.8 °C) (Axillary)   Resp 18   Ht 5' 9\" (1.753 m)   Wt 274 lb 4 oz (124.4 kg)   SpO2 94%   BMI 40.50 kg/m²     Weight:    Wt Readings from Last 3 Encounters:   10/20/18 274 lb 4 oz (124.4 kg)   08/14/18 266 lb 12.1 oz (121 kg)   10/09/14 294 lb (133.4 kg)       General: alert, ill appearing, delirius    HEENT: normocephalic, alopecia, PERRL, no scleral erythema or icterus, Oral mucosa w/ diffuse erythema, ulcerations and edema   NECK: supple without palpable adenopathy  BACK: Straight  SKIN: erythematous, sand paper like rash on face and ant superior chest.   CHEST: Rhonchi throughout  CV: Normal S1 S2, RRR, no MRG  ABD: NT, ND, no rebound or guarding, normoactive BS, no palpable masses or hepatosplenomegaly  M/S:  no erythema   EXTREMITIES: 2+ BLE edema, denies calf tenderness  NEURO: CN II - XII grossly intact  :  Pain and erythema of penis and scrotum, perianal areas of skin breakdown   CATHETER: Left TLH ([facility], 9/20/18) - erythema and excoriation from previous bandage    Data:   CBC:   Recent Labs      10/19/18   0230  10/20/18   0300  10/21/18   0330   WBC  0.2*  0.2*  0.3*   HGB  7.1*  5.9*  6.6*   HCT  19.8*  19.7*  16.2*  18.6*   MCV  81.3  80.4  80.5   PLT  7*  8*  6*     BMP/Mag:  Recent Labs      10/19/18   0230   10/20/18   0300  10/20/18   1547  10/21/18   0330   NA  141   < >  143  141  141  140  141   K  3.7   < >  3.5  3.5  3.9  3.8  3.8   CL  101   < >  105  103  103  101  101   CO2  25   < >  26  26  27  27  27   PHOS  5.3*   < >  4.4  4.0  4.5   BUN  75*   < >  68*  67*  53*  67*  66*   CREATININE  1.5*   < >  1.5*  1.5*

## 2018-10-21 NOTE — PLAN OF CARE
Problem: Falls - Risk of:  Goal: Will remain free from falls  Will remain free from falls   Outcome: Ongoing  Pt is a high fall risk d/t . See Dewayne Craft Fall Score. Bed/chair alarm in use.  Pt bed is in low position, side rails up, call light and belongings are in reach. Pt encouraged to call for assistance, pt calling appropriately. Will continue with hourly rounds for po intake, pain needs, toileting and repositioning as needed.         Problem: Pain:  Goal: Pain level will decrease  Pain level will decrease   Outcome: Ongoing  Pt unable to verbalize pain at this time, pt confused and disoriented but able to rest the majority of the day.  No pain medication given.       Problem: Bleeding:  Goal: Will show no signs and symptoms of excessive bleeding  Will show no signs and symptoms of excessive bleeding   Outcome: Ongoing       Problem: PROTECTIVE PRECAUTIONS  Goal: Patient will remain free of nosocomial Infections  Outcome: Ongoing  Pt remains in protective precautions.  Pt educated on wearing mask when in hallways. Pt, staff, and visitors adhering to handwashing guidelines. Pt educated to shower or bathe daily with chlorhexidine and linens changed daily per protocol. Pt verbalizes understanding of low microbial diet. Will continue to monitor.      Problem: Infection - Central Venous Catheter-Associated Bloodstream Infection:  Goal: Will show no infection signs and symptoms  Will show no infection signs and symptoms   Outcome: Ongoing  CVC site remains free of signs/symptoms of infection. No drainage, edema, erythema, pain, or warmth noted at site.  Dressing changes continue per protocol and on an as needed basis - see flowsheet.      Refusing BCC Bath Protocol:  Despite multiple attempts by this RN, pt refusing shower or bed bath with CHG today.  Discussed risks associated with not following BCC bath protocol including increased risk of CVC line infection & sepsis in an immunocompromised pt.  Will discuss continued refusal with treatment team if pt continues to refuse daily bath protocol for 2 or more days. [address] [facility] site cleansed with CHG wipe over dressing, skin surrounding dressing, and first 6\" of IV tubing.  Pt tolerated well.  Continued to encourage daily CHG bathing per [facility] protocol.     Problem: Discharge Planning:  Goal: Discharged to appropriate level of care  Discharged to appropriate level of care   Outcome: Ongoing        Problem: Venous Thromboembolism:  Goal: Will show no signs or symptoms of venous thromboembolism  Will show no signs or symptoms of venous thromboembolism   Outcome: Ongoing  Refusing DVT Prevention: Pt is at risk for DVT d/t decreased mobility and cancer treatment.  Pt educated on importance of activity. Pt has orders for SCDs while in bed, however pt currently refusing treatment.  Reviewed risks of DVT & PE development while inpatient.   Provider aware of patient's refusal and re-education of importance of prophylaxis.  No new orders at this time. Reema Dasilva continue to re-instruct patient and intervene as appropriate.     Problem: Activity:  Goal: Ability to tolerate increased activity will improve  Ability to tolerate increased activity will improve   Outcome: Ongoing  Due to pts condition, physical activity not encouraged by nursing at this time.     Problem: Risk for Impaired Skin Integrity  Goal: Tissue integrity - skin and mucous membranes  Structural intactness and normal physiological function of skin and  mucous membranes.    Outcome: Ongoing  Pt skin assessed this shift.  Pt oral mucositis improving, pt noted to have redness and swelling but no open sores, pt with thick ropey secretions at this time.  Pt noted to have excoriation to buttocks and trav area.   Venelex cream and barrier cream applied to buttocks.  Miconazole powder applied to trav area.  Pt with +3 pitting BLE edema noted.  Pt repositioned as much as pt would tolerate.  Pt refusing elevation of legs or heels at this time, but RN

## 2018-10-21 NOTE — PLAN OF CARE
Problem: Falls - Risk of:  Goal: Will remain free from falls  Will remain free from falls   Outcome: Ongoing  Patient is a high fall risk. See Darshan Horns Fall Score. Precautions in place, pt educated on importance of calling for help before getting up. Pt checked on frequently, tray table and call light in place. Bed in low position. Patient calling out appropriately. Will continue to monitor. Problem: Pain:  Goal: Pain level will decrease  Pain level will decrease   Outcome: Ongoing  No report of pain this shift. Problem: Bleeding:  Goal: Will show no signs and symptoms of excessive bleeding  Will show no signs and symptoms of excessive bleeding   Outcome: Ongoing  Patient's hemoglobin this AM:   Recent Labs      10/21/18   0330   HGB  6.6*     Patient's platelet count this AM:   Recent Labs      10/21/18   0330   PLT  6*    Thrombocytopenia Precautions in place. Patient showing no signs or symptoms of active bleeding. Patient transfused blood products per orders - see flowsheet. Patient verbalizes understanding of all instructions. Will continue to assess and implement POC. Call light within reach and hourly rounding in place. Problem: PROTECTIVE PRECAUTIONS  Goal: Patient will remain free of nosocomial Infections  Outcome: Ongoing  Pt remains in protective precautions. Pt educated on wearing mask when in hallways. Pt, staff, and visitors adhering to handwashing guidelines. Pt educated to shower or bathe daily with chlorhexidine and linens changed daily per protocol. Pt verbalizes understanding of low microbial diet. Will continue to monitor. Problem: Infection - Central Venous Catheter-Associated Bloodstream Infection:  Goal: Will show no infection signs and symptoms  Will show no infection signs and symptoms   Outcome: Ongoing  CVC site remains free of signs/symptoms of infection. No drainage, edema, erythema, pain, or warmth noted at site.  Dressing changes continue per protocol and on an as needed basis - see flowsheet. Compliant with [facility] Bath Protocol:  Performed CHG bath today per [facility] protocol utilizing Bed bath with CHG wipes. CVC site cleansed with CHG wipe over dressing, skin surrounding dressing, and first 6\" of IV tubing. Pt tolerated well. Continued to encourage daily CHG bathing per [facility] protocol. Problem: Discharge Planning:  Goal: Discharged to appropriate level of care  Discharged to appropriate level of care   Outcome: Ongoing  Pt verbalizes understanding of discharge plan at this time. Will continue to monitor and provide support. Problem: Venous Thromboembolism:  Goal: Will show no signs or symptoms of venous thromboembolism  Will show no signs or symptoms of venous thromboembolism   Outcome: Ongoing  Refusing DVT Prevention: Pt is at risk for DVT d/t decreased mobility and cancer treatment. Pt educated on importance of activity. Pt has orders for SCDs while in bed, however pt currently refusing treatment. Reviewed risks of DVT & PE development while inpatient. Provider aware of patient's refusal and re-education of importance of prophylaxis. No new orders at this time. Will continue to re-instruct patient and intervene as appropriate. Problem: Nutrition  Goal: Optimal nutrition therapy  Outcome: Ongoing  Patient has core pac with Renal formula @10 ml with 50 ml water flush q4hrs. Patient tolerating formula. Refusing oral pills. Physician notified. Problem: Nausea/Vomiting:  Goal: Absence of nausea/vomiting  Absence of nausea/vomiting   Outcome: Ongoing  No report of nausea. Problem: Diarrhea:  Goal: Bowel elimination is within specified parameters  Bowel elimination is within specified parameters   Outcome: Ongoing  No bowel movement this shift. Will continue to monitor.     Problem: Infection - Vancomycin-Resistant Enterococci:  Goal: Absence of vancomycin-resistant Enterococci infection  Absence of vancomycin-resistant Enterococci infection

## 2018-10-21 NOTE — PROGRESS NOTES
tomorrow. Discussed with Dr Eve Wilkins   10/10/18 d+13 0.9 1.3 1 mg po bid 7 With dose increased yesterday and level up, will continue present dose. Next level Friday. 10/12/18; d+15 1.1 1.5 1 mg po bid 7.7 No change; levels MWF   10/15/18  D+18 1.4 2.5 1 mg po bid 15.7 Level supra therapeutic with increase in renal and hepatic function. AM dose  held pending level. Will resume prograf at 0.5 mg po bid this evening with daily levels. 10/16/18  D+19 1.6 2.2 0.5 mg po bid 13.5 Will continue current dosing with daily levels. Discussed with Dr Giancarlo Page    10/17/18  D+20 1.8 2.3 0.5 mg po bid 13.8 No change   10/18/18; d+21 1.7 on Hemodialysis 2.2 0.5 mg po bid 12.8 No change;   (noted: patient did not receive 10/17/18 pm dose due to patient confusion and agitation)   10/19/15; d+22 1.5 on hemodialysis 2.7 0.5mg po bid (suspension as of 10/18 pm dose) 10.4 Discussed with - will continue current dosing of Prograf as suspension formulation that started with 10/18 2100 dose; continue daily levels   10/21/18; d+24 1.7 on hemodialysis 2.5 0.5mg po bid suspension 6.4 Discussed with Margarita Ramos - will continue same Prograf dosing for today and will check level in am                                       Please call with questions. Joshua Benoit  [facility] Clinical Pharmacist    10/21/2018 12:39 PM

## 2018-10-21 NOTE — PROGRESS NOTES
tacrolimus 0.5 mg BID   furosemide 80 mg Once   haloperidol lactate 5 mg Once   meropenem 500 mg Q24H   sodium chloride 250 mL Once   acyclovir 350 mg Q24H   linezolid 600 mg Q12H   tbo-filgrastim 300 mcg QPM   propranolol 20 mg TID   insulin regular 0-10 Units Q6H   micafungin (MYCAMINE) IVPB 150 mg Daily   VENELEX  BID   pantoprazole 40 mg Daily   And     sodium chloride (PF) 10 mL Daily   miconazole  BID   Saline Mouthwash 15 mL 4x Daily AC & HS   sodium chloride flush 10 mL 2 times per day   ursodiol 250 mg 4x Daily AC & HS      Continuous Infusion:     dextrose    sodium chloride Last Rate: 20 mL/hr at 10/18/18 0364   sodium chloride Last Rate: 20 mL/hr at 10/19/18 1632      PRN Meds:    haloperidol lactate 2 mg PRN   albumin human 12.5 g PRN   sodium chloride 100 mL PRN   heparin (porcine) 3,200 Units PRN   haloperidol lactate 0.5 mg Q6H PRN   levalbuterol 0.63 mg Q6H PRN   dextrose 12.5 g PRN   glucose 15 g PRN   dextrose 12.5 g PRN   glucagon (rDNA) 1 mg PRN   dextrose 100 mL/hr PRN   diphenhydrAMINE 12.5 mg PRN   medicated lip ointment  PRN   magic (miracle) mouthwash 5 mL 4x Daily PRN   acetaminophen 650 mg Q4H PRN   cetirizine 10 mg Daily PRN   loperamide 2 mg PRN   sodium chloride  Continuous PRN   simethicone 80 mg Q6H PRN   aluminum & magnesium hydroxide-simethicone 30 mL Q6H PRN   ondansetron 8 mg Q8H PRN   ondansetron 8 mg Q8H PRN   bisacodyl 10 mg Daily PRN   sodium chloride  Continuous PRN   alteplase 2 mg PRN   magnesium hydroxide 10 mL Daily PRN   magnesium sulfate 4 g PRN   potassium chloride 20 mEq PRN   Saline Mouthwash 15 mL Q4H PRN   prochlorperazine 10 mg Q4H PRN   Or     prochlorperazine 10 mg Q4H PRN       ALLERGIES  Tegaderm ag mesh 2\"x2\" [wound dressings];  Voriconazole; and Keflex [cephalexin]    Diet: DIET TUBE FEED CONTINUOUS/CYCLIC NPO; Renal Formula; Nasogastric; Continuous; 10; 10; 24          REVIEW OF SYSTEMS:  Positives in bold       Constitutional:  fever, chills, weakness, (SINGLE AP VIEW) [[id number]] Collected: 10/20/18 1324      Order Status: Completed Updated: 10/20/18 1327     Narrative:       AP ABDOMEN AT 12:11 PM:    REASON FOR EXAM: Corpak placement    FINDINGS:    Portable AP upright view of the upper abdomen demonstrates Corpak tube with tip just distal to the gastroesophageal junction just within the proximal body of the stomach. Nonobstructive bowel gas pattern in the upper abdomen.     Impression:         Corpak just distal to the gastroesophageal junction. PHYSICAL EXAM:  Patient Vitals for the past 8 hrs:   BP Temp Temp src Pulse Resp SpO2   10/21/18 1044 128/64 98.4 °F (36.9 °C) Axillary 94 18 97 %   10/21/18 0827 135/71 98.2 °F (36.8 °C) Axillary 103 18 94 %   10/21/18 0800 135/63 98.6 °F (37 °C) Axillary 97 18 93 %   10/21/18 0655 120/69 98.2 °F (36.8 °C) Oral 93 18 96 %   10/21/18 0633 125/67 98 °F (36.7 °C) Oral 96 18 95 %      Patient Vitals for the past 96 hrs (Last 3 readings):   Weight   10/20/18 0815 274 lb 4 oz (124.4 kg)   10/20/18 0500 281 lb 1.4 oz (127.5 kg)   10/19/18 2100 280 lb 10.3 oz (127.3 kg)       Constitutional: not in distress,  Opens eyes but not answering questions  Skin:  No rashes seen or palpated. Head, face, neck: neck symmetric, normal appearance, no neck mass, no goiter, no JVD, trachea midline. ENMT:  external ears normal, nasal mucosa unremarkable, no lip ulcers or pharyngeal exudates. Eyes:  No scleral icterus, no conjunctival injection. Lids unremarkable. Cardiovascular: heart regular rate and rhythm, no thrill, no heart murmur, +1 edema. Respiratory: lungs scattered rhonchi respiratory effort normal, No use of accessory muscles. Pulses: carotid, radial pulse  present and normal. No bruits present. Gastrointestinal:  bowels sounds normal, no rebound or rigidity, soft. Neurologic: opens eyes but does not answer questions No focal defects.

## 2018-10-22 NOTE — PROGRESS NOTES
loperamide **FOLLOWED BY** loperamide, sodium chloride, simethicone, aluminum & magnesium hydroxide-simethicone, ondansetron, ondansetron, bisacodyl, sodium chloride, alteplase, magnesium hydroxide, magnesium sulfate, potassium chloride, Saline Mouthwash, prochlorperazine **OR** prochlorperazine    Allergies: Allergies   Allergen Reactions    Tegaderm Ag Mesh 2\"X2\" [Wound Dressings] Rash    Voriconazole Other (See Comments)     Hallucinations    Keflex [Cephalexin]      Irregular heartbeat and blood in urine       PHYSICAL EXAM:       Vitals: /79 Comment: 15 min v/s for platelets  Pulse 902   Temp 98.8 °F (37.1 °C) (Axillary)   Resp 24   Ht 5' 9\" (1.753 m)   Wt 274 lb 4 oz (124.4 kg)   SpO2 100%   BMI 40.50 kg/m²     I/O:    Intake/Output Summary (Last 24 hours) at 10/22/18 0910  Last data filed at 10/22/18 0837   Gross per 24 hour   Intake             2700 ml   Output             1850 ml   Net              850 ml     I/O this shift:  In: 300 [Blood:300]  Out: -   I/O last 3 completed shifts: In: 2818 [I.V.:595; Blood:713; NG/GT:1206; IV Piggyback:304]  Out: 1850 [Urine:1850]    Physical Examination:   · General appearance: Appears comfortable at rest, Confused   · HEENT: Atraumatic, normocephalic, moist mucus membranes  · Respiratory: Normal respiratory effort. Wheezing   · Cardiovascular: regular S1/S2, with no Murmur, rub or gallop. No JVD  · Abdomen: Soft, non-tender, non-distended  · Musculoskeletal: No clubbing, cyanosis, 1+ pitting lower extremity edema, peripheral pulses present, cap refill < 2sec  · Neurologic: Neurovascularly grossly intact without any focal motor deficits.  Cranial nerves:  grossly non-focal.      Recent Labs      10/22/18   0248   PHART  7.514*   [id number]  35.8   PO2ART  115.9*       DATA:       Labs  CBC:   Recent Labs      10/20/18   0300  10/21/18   0330  10/22/18   0425   WBC  0.2*  0.3*  0.4*   HGB  5.9*  6.6*  7.8*   HCT  16.2*  18.6*  21.9*   PLT  8*  6*  10* BMP: Recent Labs      10/21/18   0330  10/21/18   1620  10/22/18   0425   NA  140  141  140  144  143   K  3.8  3.8  3.7  3.9  3.9   CL  101  101  100  105  103   CO2  27  27  27  27  27   BUN  67*  66*  73*  78*  79*   CREATININE  1.7*  1.8*  1.7*  1.7*  1.7*   GLUCOSE  186*  182*  212*  238*  233*     LFT's: Recent Labs      10/20/18   0300  10/21/18   0330  10/22/18   0425   AST  17  18  16   ALT  17  12  11   BILITOT  2.6*  2.5*  2.7*   ALKPHOS  88  96  112     Troponin: No results for input(s): TROPONINI in the last 72 hours. BNP: No results for input(s): BNP in the last 72 hours. ABGs: Recent Labs      10/22/18   0248   PHART  7.514*   [id number]  35.8   PO2ART  115.9*     INR:   Recent Labs      10/22/18   0425   INR  1.64*     Lipids: No results for input(s): CHOL, HDL in the last 72 hours. Invalid input(s): LDLCALCU    U/A:  Recent Labs      10/21/18   1620   COLORU  Yellow   PHUR  6.0   WBCUA  0-2   RBCUA  3-5*   BACTERIA  1+*   CLARITYU  SL CLOUDY*   SPECGRAV  1.015   LEUKOCYTESUR  Negative   UROBILINOGEN  0.2   BILIRUBINUR  Negative   BLOODU  TRACE-INTACT*   GLUCOSEU  Negative        ASSESSMENT AND PLAN:   Patient is a 66 y.o. 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 Patient was found to have positive blood cultures for enterococcus DNA and was started on Meropenem.      DEVON: Etiology appears originally DEVON from fluid removal through diuresis leading to ATN. Baseline BUN/Cr 28/0.9. DEVON could also have other factorials such as the patient receiving Tacrolimus, and Acyclovir both know to possibly cause nephrotoxicity. Patient is positive 1.3L since 10/21/18. 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 Hb 7.8, PL 10, T Bili 2.7, D Bili 1.6, I Bili 1.1, D dimer 2895, Chavira 13 16.

## 2018-10-22 NOTE — PROGRESS NOTES
9.  Neutropenic Fever  10. VRE bacteremia      TREATMENT:        1. Vidaza x 1 cycle (10/30/17)   2. Vyxeos (12/30/2017  3. Intermediate Dose Anna-C x 3 cycles (2/2018 - 4/2018)  4. MUD Allo-pbpc BMT ([facility] protocol)     ASSESSMENT AND PLAN:        1. AML:  Currently in CR  - S/p MUD Allo-pbpc BMT ([facility] protocol)      Preparative Regimen: Melphalan and Fludarabine  Date of BMT:  9/27/18  Source of stem cells: PBPC / 6.08x10^6 uy79gttht/kg  Donor/Recipient Blood Type:  B Positive / B Positive  Donor Sex:  Male / NMDP (DID# [id number])   CMV Donor / Recipient: Negative / Negative      Day + 25  BMBx 10/19/18:  Pending     2. ID:  VRE bacteremia, afebrile currently  -Blood culture 10/15: + VRE  -Repeat BC 10/17:  Negative  - S/p treatment for Staph UTI, Urine cx (10/2/18) - Staph Simulans  - CT a/p (10/2/18) - Acute sigmoid diverticulitis, received IV abx  - Fungitel and galactomannan (10/12/18) - Neg  - Cont IV Acyclovir prophylaxis  - Micafungin (tx dose) Day +11  - Linezolid D 7 (10/16)  - Merrem Day + 18  - Viral PCR 10/18/18:  EBV, CMV, BK negative; HHV6 detected but not quantifiable. Continue to follow weekly  - Consider LP but platelets consistently low    Abx History:  Zosyn Day + 4 (stopped 10/5/18)  IV Vanco x 6 days (d/c 10/09/18)  Vanco x 5 days (d/c 10/16)     3. Heme:  Chemotherapy induced pancytopenia  - Hives w/ platelets; pre-medicate w/ benadryl prior   - H/o hereditary hemochromatosis - needs post BMT phlebotomy   - Transfuse for Hgb < 7 and Platelets < 35H.    - 1 hour post platelet count non-sufficient increase:  PRA negative  - Elevated PT/INR:  Vit K 10 mg IV 10/15  - Continue G-CSF daily (10/15)  - Haptoglobin WNL, LDH (slightly elevated), VILMA negative(10/16)    - prolonged coags, elevated ddimer suggest DIC - have to think about TTP/HUS; [id number] 16     4. Metabolic:  Elevated creatinine, azotemia, hyperPhos.   Great UOP, weight decreasing  - Consult Renal Estephanie Jamil), appreciate

## 2018-10-22 NOTE — PROGRESS NOTES
Encephalopathy   9. Neutropenic Fever  10. VRE bacteremia      TREATMENT:        1. Vidaza x 1 cycle (10/30/17)   2. Vyxeos (12/30/2017  3. Intermediate Dose Anna-C x 3 cycles (2/2018 - 4/2018)  4. MUD Allo-pbpc BMT ([facility] protocol)     ASSESSMENT AND PLAN:        1. AML:  Currently in CR  - S/p MUD Allo-pbpc BMT ([facility] protocol)      Preparative Regimen: Melphalan and Fludarabine  Date of BMT:  9/27/18  Source of stem cells: PBPC / 6.08x10^6 hh80lnxlw/kg  Donor/Recipient Blood Type:  B Positive / B Positive  Donor Sex:  Male / NMDP (DID# [id number])   CMV Donor / Recipient: Negative / Negative      Day + 25  BMBx 10/19/18:  Pending       2. ID:  VRE bacteremia, afebrile currently  -Blood culture 10/15: + VRE  -Repeat BC 10/17:  Negative  - S/p treatment for Staph UTI, Urine cx (10/2/18) - Staph Simulans  - CT a/p (10/2/18) - Acute sigmoid diverticulitis, received IV abx  - Fungitel and galactomannan (10/12/18) - Neg  - Cont IV Acyclovir prophylaxis  - Micafungin (tx dose) Day +11  - Linezolid D 7 (10/16)  - Merrem Day + 18  -Viral PCR 10/18/18:  EBV, CMV, BK negative; HHV6 detected but not quantifiable. Continue to follow weekly  -Consider LP but platelets consistently low    Abx History:  Zosyn Day + 4 (stopped 10/5/18)  IV Vanco x 6 days (d/c 10/09/18)  Vanco x 5 days (d/c 10/16)     3. Heme:  Chemotherapy induced pancytopenia  - Hives w/ platelets; pre-medicate w/ benadryl prior   - H/o hereditary hemochromatosis - needs post BMT phlebotomy   - Transfuse for Hgb < 7 and Platelets < 12M.    - 1 hour post platelet count non-sufficient increase:  PRA negative  - Elevated PT/INR:  Vit K 10 mg IV 10/15  - Continue G-CSF daily (10/15)  - Haptoglobin WNL, LDH (slightly elevated), VILMA negative(10/16)    - prolonged coags, elevated ddimer suggest DIC - have to think about TTP/HUS; [id number] 16     4. Metabolic:  Elevated creatinine, azotemia, hyperPhos.   Great UOP, weight decreasing  - Consult Renal

## 2018-10-22 NOTE — PLAN OF CARE
Problem: Falls - Risk of:  Goal: Will remain free from falls  Will remain free from falls   Outcome: Ongoing  Orthostatic vital signs obtained at start of shift - see flowsheet for details. Pt does not meet criteria for orthostasis. Pt is a Med fall risk. See Vee Cheek Fall Score and ABCDS Injury Risk assessments.   + Screening for Orthostasis and/or + High Fall Risk per WRIGHT/ABCDS: Explained fall risk precautions to pt and family and rationale behind their use to keep the patient safe. Pt bed is in low position, side rails up, call light and belongings are in reach. Fall wristband applied and present on pts wrist.  Bed alarm on. Pt encouraged to call for assistance. Will continue with hourly rounds for PO intake, pain needs, toileting and repositioning as needed. Problem: Pain:  Goal: Pain level will decrease  Pain level will decrease   Outcome: Ongoing  No complaints of pain at this time. Will continue to monitor. Problem: Bleeding:  Goal: Will show no signs and symptoms of excessive bleeding  Will show no signs and symptoms of excessive bleeding   Outcome: Ongoing  Patient's hemoglobin this AM:   Recent Labs      10/22/18   0425   HGB  7.8*     Patient's platelet count this AM:   Recent Labs      10/22/18   0425   PLT  10*    Thrombocytopenia Precautions in place. Patient showing no signs or symptoms of active bleeding. Patient transfused blood products per orders - see flowsheet. Patient verbalizes understanding of all instructions. Will continue to assess and implement POC. Call light within reach and hourly rounding in place. Problem: PROTECTIVE PRECAUTIONS  Goal: Patient will remain free of nosocomial Infections  Outcome: Ongoing  Pt remains in neutropenic precautions per floor policy. Pt, visitors, and staff noted to be following precautions appropriately. Handwashing in place; pt wearing mask in hallway per protocol. Pt in private room. Low microbial diet in place.  Will continue to

## 2018-10-22 NOTE — PROGRESS NOTES
tomorrow. Discussed with Dr Evelyn Jerez   10/10/18 d+13 0.9 1.3 1 mg po bid 7 With dose increased yesterday and level up, will continue present dose. Next level Friday. 10/12/18; d+15 1.1 1.5 1 mg po bid 7.7 No change; levels MWF   10/15/18  D+18 1.4 2.5 1 mg po bid 15.7 Level supra therapeutic with increase in renal and hepatic function. AM dose  held pending level. Will resume prograf at 0.5 mg po bid this evening with daily levels. 10/16/18  D+19 1.6 2.2 0.5 mg po bid 13.5 Will continue current dosing with daily levels. Discussed with Dr Eldon Enamorado    10/17/18  D+20 1.8 2.3 0.5 mg po bid 13.8 No change   10/18/18; d+21 1.7 on Hemodialysis 2.2 0.5 mg po bid 12.8 No change;   (noted: patient did not receive 10/17/18 pm dose due to patient confusion and agitation)   10/19/15; d+22 1.5 on hemodialysis 2.7 0.5mg po bid (suspension as of 10/18 pm dose) 10.4 Discussed with - will continue current dosing of Prograf as suspension formulation that started with 10/18 2100 dose; continue daily levels   10/21/18; d+24 1.7 on hemodialysis 2.5 0.5mg po bid suspension 6.4 Discussed with Jess Wallace - will continue same Prograf dosing for today and will check level in am   10/22/18  D+25 1.7 on HD 2.7 0.5 mg po suspension bid 5 Will give extra 0.5 mg po now and change to 1 mg am and 0.5 mg pm. Discussed with Dr Eldon Enamorado                               Please call with questions. Zoya Julian, Pharm. D.  [facility] Clinical Pharmacist    10/22/2018 1:05 PM

## 2018-10-22 NOTE — PROGRESS NOTES
Physical Therapy / Occupational Therapy   No treatment  Attempted to see patient for PT / OT treatment. Spoke with RN who requests therapy hold treatment today and return 10/23 to see patient reporting he has been down for multiple procedures this morning and is getting ready to have dialysis. Will follow up per POC.      Jessica CORDOVA [address]  [place]ra Rosa OTR/L  [phone]

## 2018-10-23 NOTE — FLOWSHEET NOTE
Treatment time: 3 hours  Net UF: 2300 ml    Pre weight: 121.3 kg   Post weight: 119.1 kg  EDW: TBD kg    Access used: Right IJ  Access function: Good with  ml/min    Medications or blood products given: 25% albumin x 3 times    Regular outpatient schedule: T.T.S    Summary of response to treatment: asymptomatic hypotension, 25% albumin given and reduced UF goal.     Copy of dialysis treatment record placed in chart, to be scanned into EMR. 10/23/18 0730 10/23/18 1050   Vital Signs   /72 116/75   Temp 98.1 °F (36.7 °C) 97.3 °F (36.3 °C)   Pulse 93 102   Weight 267 lb 6.7 oz (121.3 kg)  (1 pillow, 1 sheet, 1 pad and bed extension part) 262 lb 9.1 oz (119.1 kg)   Weight Method Actual;Bed scale Actual;Bed scale   Percent Weight Change 1.42 -1.81   Post-Hemodialysis Assessment   Post-Treatment Procedures --  Blood returned;Catheter capped, clamped and heparinized x 2 ports   Machine Disinfection Process --  Acid/Vinegar Clean;Heat Disinfect; Exterior Machine Disinfection   Rinseback Volume (ml) --  400 ml   Total Liters Processed (l/min) --  33.8 l/min   Dialyzer Clearance --  Clear   Duration of Treatment (minutes) --  180 minutes   Heparin amount administered during treatment (units) --  0 units   Hemodialysis Intake (ml) --  550 ml   Hemodialysis Output (ml) --  2850 ml   NET Removed (ml) --  2300 ml   Tolerated Treatment --  Fair   Edema Generalized +1;Non-pitting Non-pitting   RLE Edema +2;Pitting +1;+2;Pitting   LLE Edema +2;Pitting +1;Pitting   Perineal Edema +1;Non-pitting Non-pitting

## 2018-10-23 NOTE — PROGRESS NOTES
Labs      10/21/18   0330  10/22/18   0425  10/23/18   0345   WBC  0.3*  0.4*  0.5*   HGB  6.6*  7.8*  7.2*   HCT  18.6*  21.9*  20.0*   PLT  6*  10*  18*       BMP: Recent Labs      10/22/18   0425  10/22/18   1621  10/23/18   0345   NA  144  143  140  142   K  3.9  3.9  4.2  4.0   CL  105  103  101  102   CO2  27  27  27  26   BUN  78*  79*  42*  65*   CREATININE  1.7*  1.7*  0.8  1.5*   GLUCOSE  238*  233*  144*  219*     LFT's: Recent Labs      10/21/18   0330  10/22/18   0425  10/23/18   0345   AST  18  16  15   ALT  12  11  12   BILITOT  2.5*  2.7*  2.6*   ALKPHOS  96  112  108     Troponin: No results for input(s): TROPONINI in the last 72 hours. BNP: No results for input(s): BNP in the last 72 hours. ABGs: Recent Labs      10/22/18   0248   PHART  7.514*   [id number]  35.8   PO2ART  115.9*     INR:   Recent Labs      10/22/18   0425   INR  1.64*     Lipids: No results for input(s): CHOL, HDL in the last 72 hours. Invalid input(s): LDLCALCU    U/A:  Recent Labs      10/21/18   1620   COLORU  Yellow   PHUR  6.0   WBCUA  0-2   RBCUA  3-5*   BACTERIA  1+*   CLARITYU  SL CLOUDY*   SPECGRAV  1.015   LEUKOCYTESUR  Negative   UROBILINOGEN  0.2   BILIRUBINUR  Negative   BLOODU  TRACE-INTACT*   GLUCOSEU  Negative        ASSESSMENT AND PLAN:   Patient is a 66 y.o. 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 Patient was found to have positive blood cultures for enterococcus DNA and was started on Meropenem.      DEVON: Etiology appears originally DEVON from fluid removal through diuresis leading to ATN. Baseline BUN/Cr 28/0.9. DEVON could also have other factorials such as the patient receiving Tacrolimus, and Acyclovir both know to possibly cause nephrotoxicity. Patient is positive 1.3L since 10/21/18.  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 Hb 7.8, PL 10, T Bili 2.7, D Bili 1.6, I Bili 1.1, D dimer 2895, Chavira 13 16. Tacrolimus level 5.0. Haptoglobin 46, INR 1.64  -Strict I/O  -Renal panel daily   -Repeat Echo as limited previous study: to rule out any cardio renal syndrome  -Repeat Haptoglobin   -Tacrolimus level  -Will do HD today if BP remains stable- will switch HD to every other day     Anemia- Hb has remained labile- etiology could be due to thrombotic microangiopathy. Haptoglobin level 46. Patient received pRBC on 10/21/18     Azotemia: BUN 78 on 10/22/18. TPN has been restarted that can lead to elevated BUN. -Will start dialysis today for increasing Uremia and continued confusion        The patient and / or the family were informed of the results of any tests, a time was given to answer questions, a plan was proposed and they agreed with plan. .     Code Status:  Full Code  FEN: Low Microbial   PPX: SCD  DISPO: [place] 10 MS IV  10/23/2018  8:12 AM        Patient was seen and examined and the case was discussed with the medical student . Manisha Salomon acted as my scribe.  I agree with assessment and plan    Seen on HD and will remove 2-3 liters. Now he will be scheduled 3/ weekly HD.  I expect him to come off dialysis soon    Thanks  Nephrology  [address], [address]  Office: [phone]  Cell: [phone]  Fax: [phone]

## 2018-10-23 NOTE — PROGRESS NOTES
RESPIRATORY THERAPY ASSESSMENT    Name:  Jacques Ríos  Medical Record Number:  [id number]  Age: 67 y.o. Gender: male  : 1945  Today's Date:  10/23/2018  Room:  [facility]3507-01    Assessment     Is the patient being admitted for a COPD or Asthma exacerbation? No   (If yes the patient will be seen every 4 hours for the first 24 hours and then reassessed)    Patient Admission Diagnosis      Allergies  Allergies   Allergen Reactions    Tegaderm Ag Mesh 2\"X2\" [Wound Dressings] Rash    Voriconazole Other (See Comments)     Hallucinations    Keflex [Cephalexin]      Irregular heartbeat and blood in urine       Minimum Predicted Vital Capacity:               Actual Vital Capacity:                Pulmonary History:No history  Home Oxygen Therapy:  room air  Home Respiratory Therapy:None   Current Respiratory Therapy: HHN Xopenex 0.63mg prn  Treatment Type: HHN  Medications: Levalbuterol HCL    Respiratory Severity Index(RSI)   Patients with orders for inhalation medications, oxygen, or any therapeutic treatment modality will be placed on Respiratory Protocol. They will be assessed with the first treatment and at least every 72 hours thereafter. The following severity scale will be used to determine frequency of treatment intervention.     Smoking History: No Smoking History = 0    Social History  Social History   Substance Use Topics    Smoking status: Never Smoker    Smokeless tobacco: Never Used    Alcohol use 0.0 oz/week     2 - 3 Cans of beer per week      Comment: rare ETOH now       Recent Surgical History: None = 0  Past Surgical History  Past Surgical History:   Procedure Laterality Date    BACK SURGERY      CARPAL TUNNEL RELEASE Right     CHOLECYSTECTOMY      DENTAL SURGERY      implant top front middle    FOOT SURGERY Right     triple arthrodesis    KNEE ARTHROSCOPY Right     x 2    KNEE ARTHROSCOPY Left     KNEE ARTHROSCOPY Left 14    partial lateral menisectomy medial meniscal

## 2018-10-23 NOTE — PROGRESS NOTES
oropharyngeal dysphagia at this time. Pt was reportedly on clear liquid diet previously which was (somehow?) discontinued, so pt was referred to  for swallow evaluation to determine if he is appropriate to resume clear liquid diet. Pt does not follow simple commands consistently and is confused. Pt showed very slow response time during assessment and required education and encouragement for PO trials, constantly stating, \"I don't know what's going on. \" Oral phase is characterized by poor labial seal, decreased/Absent AP transit, decreased bolus formation, and suspected premature spillage of bolus to pharynx. Pharyngeal phase is characterized by delayed initiation of swallow, reduced laryngeal elevation, suspected decreased airway protection, suspected pharyngeal pooling, and suspected pharyngeal residue. Pt was presented with trials of ice chips and thin liquids via cup sips. Pt required max cues/direction to participate in PO trials (pt confused and not following directions well). Pt did not attempt to maneuver ice chip in oral cavity. He said he didn't want to swallow it and instead let it melt in oral cavity. Absent swallow reflex noted. Pt took 3 sips of thin liquid water which took significant increased time due to need to be redirected continually. With thin liquids via cup sips, pt demonstrated delayed or immediate cough on 3/3 trials, as well as wet vocal quality due to increased secretions. Pt making several attempts to expectorate copius amounts of secretions; eventually he used suction to remove from oral cavity completely. No further PO trials given due to risk of aspiration and increased secretions. Recommend NPO with alternative means of nutrition/hydration at this time. Pt likely would not be appropriate for instrumental assessment due to confusion and not following directions consistently at this time. Dysphagia Outcome Severity Scale: Level 1: Severe dysphagia- NPO.  Unable to tolerate any PO

## 2018-10-24 NOTE — PROGRESS NOTES
Clinical Pharmacy Progress Note    Patient Name: Homar Sheth  YOB: 1945  Diagnosis: AML    GVHD Prophylaxis:  Tacrolimus (Prograf) starting Day -2 per FATE study  · Adjusted according to levels - drawn via red lumen  Methotrexate 30 mg IVP days +1, followed by Methotrexate 20 mg IVP +3, +6, +11 =>        (per FATE study: MTX 15mg/m2 IVP d+1 and MTX 10mg/m2 IVP on +3, +6, +11)     Tacrolimus (Prograf) levels starting day +1  Tacrolimus (Prograf) goal level:  5-15 ng/mL per FATE study    Date SCr Bili Prograf Dose Prograf Level Adjustments / Comments   9/20/18 0.5 0.9 - - Patient admitted for ALLO Lite MUD- Melphlan/Fludara. Will start oral Prograf 2.5 mg po bid on 9/25/18 with 1st level on 9/28/18 and MWF thereafter   9/28/18; day+1 <0.5 0.3 2.5mg po bid 18.3 Discussed with Dr. Estrada Medrano- Level supratherapeutic; will hold tonight's and Sat am's Prograf doses and obtain tacrolimus level on Sat 9/29 at 8:30 and then re-evaluate. 9/29; d+2 0.6 0.4 On hold 12.7 Resume tacrolimus this PM at 1.5 mg po bid with next level Mon 10/1.   10/1/18 d+4 0.6 0.7 1.5 mg po bid 11.4 no change   10/3/18 d+6 0.8 1 1.5 mg po bid 12.9 No change    10/5/18 1.1 1.4 1.5 mg po bid 16.1 Will change Prograf to 0.5 mg po daily and monitor renal and hepatic function with a Prograf level Sunday to ensure level is declining and adjust dose as necessary. Discussed with Dr Giancarlo Page   10/7; d10 1.1 1.2 0.5 mg po daily 6.4 Tacrolimus level therapeutic, no change in dose; continue levels MWF.    10/8/18  D+11 0.9 1.5 0.5 mg po daily 9.4 (drawn 1 hour after dose administered) With improved renal function and level from yesterday 6.4 and today's level 9.4 (drawn 1 hour post administration) will increase Prograf to 1 mg in AM and 0.5 mg nightly. Discussed with Dr Giancarlo Page. Will get additional level Tuesday before dose. 10/9/18 d+12 0.9 1.4 1 mg am/0.5 mg pm 5.3 Level trending to sub-therapeutic. Will increase dose to 1 mg po bid.  Level

## 2018-10-24 NOTE — PROGRESS NOTES
Pulmonary Followup Note    Indication for visit: Acute Hypoxia     Subjective: No acute events overnight. Has gone down on oxygen requirement to 2L HFNC. Was doing well this AM, was asking to use the bedpan, but no complaints about SOB.  sodium chloride  250 mL Intravenous Once    [START ON 10/25/2018] micafungin (MYCAMINE) IVPB  50 mg Intravenous Daily    pantoprazole sodium  40 mg Per NG tube QAM AC    tacrolimus  1 mg Oral Daily    tacrolimus  0.5 mg Per G Tube Nightly    meropenem  500 mg Intravenous Q24H    acyclovir  350 mg Intravenous Q24H    linezolid  600 mg Intravenous Q12H    tbo-filgrastim  300 mcg Subcutaneous QPM    propranolol  20 mg Oral TID    insulin regular  0-10 Units Subcutaneous Q6H    VENELEX   Topical BID    miconazole   Topical BID    Saline Mouthwash  15 mL Swish & Spit 4x Daily AC & HS    sodium chloride flush  10 mL Intravenous 2 times per day    ursodiol  250 mg Oral 4x Daily AC & HS       Continuous Infusions:   dextrose      sodium chloride Stopped (10/22/18 7925)    sodium chloride 20 mL/hr at 10/19/18 1632       PHYSICAL EXAMINATION:  /77   Pulse 96   Temp 96.7 °F (35.9 °C) (Temporal)   Resp 28   Ht 5' 9\" (1.753 m)   Wt 274 lb 4 oz (124.4 kg)   SpO2 97%   BMI 40.50 kg/m²     Gen: NAD. Mumbling but speaking in full sentences with out using accessory resp muscles,   HEENT: PERRL, EOMI, OP nl  Lung: Decreased breath sounds, clear to auscultation in the BL lung field, no wheezes, rales or rhonchi   CV: RRR without M/R/R  Abd: +BS, soft, NT/ND  Ext: No edema.     DATA  CBC:   Recent Labs      10/22/18   0425  10/23/18   0345  10/24/18   0415   WBC  0.4*  0.5*  0.7*   HGB  7.8*  7.2*  7.0*   HCT  21.9*  20.0*  19.7*   MCV  83.5  81.7  81.7   PLT  10*  18*  7*     BMP:   Recent Labs      10/23/18   0345  10/23/18   1630  10/24/18   0415   NA  142  140  138  140   K  4.0  3.9  4.0  3.9   CL  102  101  100  101

## 2018-10-24 NOTE — PLAN OF CARE
Ongoing  Refusing DVT Prevention: Pt is at risk for DVT d/t decreased mobility and cancer treatment.  Pt educated on importance of activity. Pt has orders for SCDs while in bed, however pt currently refusing treatment.  Reviewed risks of DVT & PE development while inpatient.   Treatment team aware of patient's refusal and re-education of importance of prophylaxis.  No new orders at this time. Billye Pica continue to re-instruct patient and intervene as appropriate. Problem: Activity:  Goal: Ability to tolerate increased activity will improve  Ability to tolerate increased activity will improve    Outcome: Ongoing  Unstable:  Due to pts condition, physical activity not encouraged by nursing at this time. Problem: Nausea/Vomiting:  Goal: Absence of nausea/vomiting  Absence of nausea/vomiting   Outcome: Ongoing  Pt free from nausea/vomiting throughout the night, will continue to monitor. Problem: Diarrhea:  Goal: Bowel elimination is within specified parameters  Bowel elimination is within specified parameters   Outcome: Ongoing  Pt free from diarrhea throughout the night, will continue to monitor. Problem: Infection - Vancomycin-Resistant Enterococci:  Goal: Absence of vancomycin-resistant Enterococci infection  Absence of vancomycin-resistant Enterococci infection   Outcome: Ongoing  Pt remains in isolation for active VRE infection, maintained isolation precautions, will continue to monitor. Problem: Mental Status - Impaired:  Goal: Mental status will improve  Mental status will improve   Outcome: Ongoing  Pt appears to be A&O x2 but unable to fully assess due to pt's inability to speak clearly, calm and cooperative throughout the night, follows commands appropriately, will continue to monitor.     Problem: Skin Integrity:  Goal: Absence of new skin breakdown  Absence of new skin breakdown   Outcome: Ongoing  Pt at risk for impaired skin integrity, excoriation noted on buttocks, applying veneclex as needed,

## 2018-10-24 NOTE — PROGRESS NOTES
Speech Language Pathology  Attempt    Shine Magana  1945    Attempted to see pt for dysphagia follow-up. Chart reviewed and discussed with RN, Chase Hathaway. Per RN, pt is lethargic at this time and not following commands consistently. Pt has had no significant change/improvement in status since last evaluated (10/23/18). Will follow-up next session as pt is appropriate.     Thanks,  ALFREDO [facility]  Speech-Language Pathologist

## 2018-10-24 NOTE — PLAN OF CARE
tolerate increased activity will improve  Ability to tolerate increased activity will improve    Outcome: Ongoing      Problem: Nausea/Vomiting:  Goal: Absence of nausea/vomiting  Absence of nausea/vomiting   Outcome: Met This Shift  No n/v this shift. Problem: Mental Status - Impaired:  Goal: Mental status will improve  Mental status will improve   Outcome: Ongoing  Pt able to respond to yes/no questions. Problem: Skin Integrity:  Goal: Absence of new skin breakdown  Absence of new skin breakdown   Outcome: Met This Shift  Venelex and micontin powder applied this shift per orders. Problem: Serum Glucose Level - Abnormal:  Goal: Ability to maintain appropriate glucose levels has stabilized  Ability to maintain appropriate glucose levels has stabilized   Accuchcecks q6hrs this shift. Pt given insulin per orders.

## 2018-10-24 NOTE — PROGRESS NOTES
1614 ml   Output             3370 ml   Net            -1756 ml       Vital Signs:  BP 99/71   Pulse 85   Temp 97.6 °F (36.4 °C) (Axillary)   Resp 22   Ht 5' 9\" (1.753 m)   Wt 275 lb 9.2 oz (125 kg)   SpO2 98%   BMI 40.70 kg/m²     Weight:    Wt Readings from Last 3 Encounters:   10/23/18 275 lb 9.2 oz (125 kg)   08/14/18 266 lb 12.1 oz (121 kg)   10/09/14 294 lb (133.4 kg)       General: alert, ill appearing, delirius    HEENT: normocephalic, alopecia, PERRL, no scleral erythema or icterus, Oral mucosa w/ improving mucositis  NECK: supple without palpable adenopathy  BACK: Straight  SKIN: erythematous, sand paper like rash on face and ant superior chest.   CHEST: Rhonchi throughout  CV: Normal S1 S2, RRR, no MRG  ABD: NT, ND, no rebound or guarding, normoactive BS, no palpable masses or hepatosplenomegaly  M/S:  no erythema   EXTREMITIES: 2+ BLE edema, denies calf tenderness  NEURO: CN II - XII grossly intact  :  Pain and erythema of penis and scrotum, perianal areas of skin breakdown   CATHETER: Left TLH (Barrat, 9/20/18) - erythema and excoriation from previous bandage    Data:   CBC:   Recent Labs      10/22/18   0425  10/23/18   0345  10/24/18   0415   WBC  0.4*  0.5*  0.7*   HGB  7.8*  7.2*  7.0*   HCT  21.9*  20.0*  19.7*   MCV  83.5  81.7  81.7   PLT  10*  18*  7*     BMP/Mag:  Recent Labs      10/22/18   0425   10/23/18   0345  10/23/18   1630  10/24/18   0415   NA  144  143   < >  142  140  138  140   K  3.9  3.9   < >  4.0  3.9  4.0  3.9   CL  105  103   < >  102  101  100  101   CO2  27  27   < >  26  27  24  25   PHOS  5.3*   < >  4.9  4.2  4.9   BUN  78*  79*   < >  65*  50*  69*  60*   CREATININE  1.7*  1.7*   < >  1.5*  1.3  1.7*  1.7*   MG  1.90   --   1.80   --   1.80    < > = values in this interval not displayed.      LIVP:   Recent Labs      10/22/18   0425  10/23/18   0345  10/24/18   0415   AST  16  15  34   ALT  11  12  18   BILIDIR  1.6*  1.3*  1.3*   BILITOT  2.7* [id number]  Donor/Recipient Blood Type:  B Positive / B Positive  Donor Sex:  Male / NMDP (DID# [id number])   CMV Donor / Recipient: Negative / Negative      Day + 27     2. 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   - CT a/p (10/2/18) - Acute sigmoid diverticulitis, received IV abx  - S/p treatment for Staph UTI, Urine cx (10/2/18) - Staph Simulans  - Blood cx (10/15/18): + VRE, repeat blood cx (10/17/18): Negative  - Fungitel and galactomannan (10/12/18) - Negative  - Viral PCR weekly:  EBV, CMV, BK negative; HHV6 (10/18/18) detected but not quantifiable  - Unable to do LP d/t thrombocytopenia   - Cont IV Acyclovir prophylaxis  - Merrem Day + 20  - Micafungin (tx dose) Day + 13, change to 50 mg today  - Linezolid (started 10/16/18) Day + 9    Abx History:  Zosyn Day + 4 (stopped 10/5/18)  IV Vanco x 6 days (d/c 10/09/18)  Vanco x 5 days (d/c 10/16)     3. Heme:  Chemotherapy induced pancytopenia  - Hives w/ platelets; pre-medicate w/ benadryl prior   - H/o hereditary hemochromatosis - needs post BMT phlebotomy   - PRA (10/16/18) - negative  - Coagulopathy:  S/p Vit K 10 mg IV 10/15/18  - Haptoglobin WNL, LDH (slightly elevated, now WNL), VILMA negative (10/16/18)  - [id number] (10/18/18) - 16, ? TTP/HUS   - Transfuse for Hgb < 7 and Platelets < 29L.    - Cont G-CSF daily (started 10/15/18)  - PRBC and Plt transfusion today; try next transfusions without pre meds, since he becomes so somnolent     4. 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 Also w/ azotemia  - s/p lasix gtt & diurel (stopped 10/16/18)   - Cont HD three times weekly(started 10/17/18), per nephrology, appreciate recs   - Replace potassium and magnesium per policy.        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   Follow closely, but has been stable   - S/p MTX per ProTmume protocol: 15 mg/m2 on day + 1;

## 2018-10-24 NOTE — PROGRESS NOTES
Education  Role of PT. Questionable understanding. Safety Devices  Pt left with needs in reach. In bed with bed alarm on. RN present and updated. Wife present. Assessment:  Pt needing encouragement to participate this session. Wife present and encouraging throughout. Pt would benefit from continued skilled PT as he is well below baseline for mobility. AM-PAC score  AM-PAC Inpatient Mobility Raw Score : 8  AM-PAC Inpatient T-Scale Score : 28.52  Mobility Inpatient CMS 0-100% Score: 86.62  Mobility Inpatient CMS G-Code Modifier : CM    Goals: (as determined and assessed by primary PT)  Time Frame for Short term goals: Discharge  Short term goal 1: indep bed mobility   Short term goal 2: Indep transfers   Short term goal 3: Indep gait with rollator x 3-4 unit laps. Plan:  Times per week: 2-5;    Current Treatment Recommendations: Gait Training, Functional Mobility Training, Endurance Training, Manual Therapy - Soft Tissue Mobilization, Home Exercise Program    Therapy Time    Individual  Concurrent  Group  Co-treatment    Time In  1446            Time Out  1525            Minutes  39              Timed Code Treatment Minutes: 39  Total Treatment Minutes: 39    Will continue per plan of care. If patient is discharged prior to next treatment, this note will serve as the discharge summary.     [place], [id number]

## 2018-10-25 PROBLEM — R40.20 LOC (LOSS OF CONSCIOUSNESS) (HCC): Status: ACTIVE | Noted: 2018-01-01

## 2018-10-25 PROBLEM — I48.0 PAF (PAROXYSMAL ATRIAL FIBRILLATION) (HCC): Status: ACTIVE | Noted: 2018-01-01

## 2018-10-25 NOTE — CONSULTS
[facility] and Physical  [facility]    [facility]    Chief Complaint:    HPI:     Patient is a 67 y.o. male \" became unresponsive twice on HD and code was called. BP did not drop significantly and pulse was never lost. He was just not responsive to verbal or touch. CT head was normal. No CPR was done\". He was in sinus at first code 5:55 am with subsequent sinus slowing and av block typical of apnea/vagal input with quick resolution. Second episode was 8:01 , patient was in afib with controlled rate. Monitor reveals no bradycardia and was undergoing dialysis. One note ? seizure activity. Cards consult. Past Medical History:   Diagnosis Date    Arthritis     Cancer ([facility] [address]     AML    Dental crowns present     multiple upper and lower, plus one implant - top front middle    Enlarged prostate     Hemochromatosis     phlebotomy monthly.  no longer a problem    History of blood transfusion     Hx of blood clots 10/2017    Left groin    Hypertension     Shoulder pain, right     VRE (vancomycin resistant enterococcus) culture positive 10/02/2018      Past Surgical History:   Procedure Laterality Date    BACK SURGERY      CARPAL TUNNEL RELEASE Right     CHOLECYSTECTOMY      DENTAL SURGERY      implant top front middle    FOOT SURGERY Right     triple arthrodesis    KNEE ARTHROSCOPY Right     x 2    KNEE ARTHROSCOPY Left     KNEE ARTHROSCOPY Left 6/25/14    partial lateral menisectomy medial meniscal chondroplasty    ND INSJ TUNNELED CVC W/O SUBQ PORT/ AGE 5 YR/> Left 9/20/2018    TRIPLE LUMEN NUNO PLACEMENT UNDER FLUOROSCOPY [facility] W/SUBQ [facility]/ CTR/PRPH INSJ Left 9/20/2018    PORT REMOVAL performed by Erinn Ramos MD at [facility]      plasma button    TONSILLECTOMY          Prescriptions Prior to Admission: ursodiol (CARYL) 250 MG tablet, Take 250 mg by mouth 4 times daily (before meals and nightly)  zolpidem breathing. Patient had to be scanned   sitting up in chair.   Normal left ventricle size. There is mild concentric left ventricular   hypertrophy.   Overall left ventricular systolic function difficult to assess accurately   but appears to be preserved .   No diagnostic regional wall motion abnormalities grossly with definity   Diastolic dysfunction can not be determined.   Trivial tricuspid regurgitation.   Bi-atrial enlargement.       Recent Labs      10/24/18   0415  10/25/18   0320  10/25/18   0423  10/25/18   0942   NA  138  140  138  137  137   K  4.0  3.9  3.7  3.6  4.0   CL  100  101  98*  98*  98*   CO2  24 25 21 21  18*   PHOS  4.9  6.4*   --   5.2*   BUN  69*  60*  110*  110*  70*   CREATININE  1.7*  1.7*  2.4*  2.5*  1.8*     Recent Labs      10/24/18   0415  10/25/18   0320  10/25/18   0942   WBC  0.7*  1.4*  1.7*   HGB  7.0*  8.5*  7.9*   HCT  19.7*  23.5*  22.2*   MCV  81.7  81.3  81.9   PLT  7*  7*  34*     No results found for: CKTOTAL, CKMB, CKMBINDEX, TROPONINI      Assessment:     Principal Problem:    MUD Allo-pb BMT  Active Problems:    Port-A-Cath in place    Acute myeloid leukemia not having achieved remission (HCC)    AML (acute myeloid leukemia) in remission ([facility] [address]    LOC (loss of consciousness) ([facility])    PAF (paroxysmal atrial fibrillation) ([facility])  Resolved Problems:    * No resolved hospital problems. *      Plan:     1. The two code episodes to do appear cardiac despite two different rhythm disturbances. 2. The patient is in afib and could be CV if does not convert spontaneously. 3. With severe thrombocytopenia etc anticoag not a consideration. 4. The team is pursuing presumed CNS event as cause for abrupt change.   5. Reviewed with patient's wife and team.

## 2018-10-25 NOTE — PROGRESS NOTES
Clinical Pharmacy Progress Note    Patient Name: Josué Salazar  YOB: 1945  Diagnosis: AML    GVHD Prophylaxis:  Tacrolimus (Prograf) starting Day -2 per FATE study  · Adjusted according to levels - drawn via red lumen  Methotrexate 30 mg IVP days +1, followed by Methotrexate 20 mg IVP +3, +6, +11 =>        (per FATE study: MTX 15mg/m2 IVP d+1 and MTX 10mg/m2 IVP on +3, +6, +11)     Tacrolimus (Prograf) levels starting day +1  Tacrolimus (Prograf) goal level:  5-15 ng/mL per FATE study    Date SCr Bili Prograf Dose Prograf Level Adjustments / Comments   9/20/18 0.5 0.9 - - Patient admitted for ALLO Lite MUD- Melphlan/Fludara. Will start oral Prograf 2.5 mg po bid on 9/25/18 with 1st level on 9/28/18 and MWF thereafter   9/28/18; day+1 <0.5 0.3 2.5mg po bid 18.3 Discussed with Dr. Angelica Nguyen- Level supratherapeutic; will hold tonight's and Sat am's Prograf doses and obtain tacrolimus level on Sat 9/29 at 8:30 and then re-evaluate. 9/29; d+2 0.6 0.4 On hold 12.7 Resume tacrolimus this PM at 1.5 mg po bid with next level Mon 10/1.   10/1/18 d+4 0.6 0.7 1.5 mg po bid 11.4 no change   10/3/18 d+6 0.8 1 1.5 mg po bid 12.9 No change    10/5/18 1.1 1.4 1.5 mg po bid 16.1 Will change Prograf to 0.5 mg po daily and monitor renal and hepatic function with a Prograf level Sunday to ensure level is declining and adjust dose as necessary. Discussed with Dr Jolynn Cardoso   10/7; d10 1.1 1.2 0.5 mg po daily 6.4 Tacrolimus level therapeutic, no change in dose; continue levels MWF.    10/8/18  D+11 0.9 1.5 0.5 mg po daily 9.4 (drawn 1 hour after dose administered) With improved renal function and level from yesterday 6.4 and today's level 9.4 (drawn 1 hour post administration) will increase Prograf to 1 mg in AM and 0.5 mg nightly. Discussed with Dr Jolynn Cardoso. Will get additional level Tuesday before dose. 10/9/18 d+12 0.9 1.4 1 mg am/0.5 mg pm 5.3 Level trending to sub-therapeutic. Will increase dose to 1 mg po bid.  Level

## 2018-10-25 NOTE — PROGRESS NOTES
of the bilateral lower extremities. There is no    evidence of deep or superficial venous thrombosis. ASSESSMENT/PLAN:  Mr. Marko Florence is a 66 yo M w/ PMHx of AML s/p  BMT (on 9/27/18), hx of LLE DVT, VRE, HTN. From a respiratory standpoint and mentation, patient has acutely declined. Along with a change in neuro check, it would be advised to r/o any CNS abnormality, however, for further imaging and airway protection in the setting of such and acute decline, patient was intubated this AM. Upon intubation, patient had low BP so bolus fluids were initiated in order to adequately perfuse patient without putting any further strain on the heart in setting of new onset Afib. In addition, CT head, abd w/ oral contrast and MRI of head were ordered along with US Abd. Patient flipped back into NSR at 1225 this afternoon. 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     -intubated and sedated with propofol gtt  -s/p 1 L NS bolus, 500 cc NS ordered for this afternoon in order to maintai SBP   -Levophed gtt ordered   -cont abx in setting of possible GVHD and sepsis   -awaiting MRI head, CT abd, and ultrasound abd     Assessment and plan discussed with attending physician, Dr. Alexa Otto M.D. Cely Delgadillo M.D.   Internal Medicine PGY-1  Pager: [phone]

## 2018-10-25 NOTE — PROCEDURES
Intubation:     Indication: acute respiratory failure    Anesthesia: Propofol 50mg IV    Procedure:  Consent was obtained from the wife due to the emergency nature of procedure. Patient was preoxygenated with Ambu bag and 100% O2 for about three minutes. All equipments was checked. Glidescope was inserted into the oral cavity and placed in proper position. Vocal cords were visualized. ETT size 8.0 was passed through the vocal cords and stylet was removed. There was bilateral breath sounds and positive CO2 colorimetric detector. Tube was fixed at 23 cm at the lip. There was no immediate complication. Procedure was performed by Dr. Tomas Best under my direct supervision.

## 2018-10-25 NOTE — SIGNIFICANT EVENT
Code blue called at 8:01am.  Pt bradycardic initially, but never lost pulse. Quickly regained pulse rate to 110s and movement throughout all extremities. He was minimally responsive to voice and/or voice command. Noted to not be pt's baseline. He was approx 45 min away from finishing a dialysis session. Dialysis was immediately stopped. Primary tx team at bedside. Exam:   Vital signs:  BP 100s/70s    -120   O2 mid 90s  Cardiac: Irregular rhythm, likely afib  Pulmonary:  Diffuse crackles throughout  Abdomen: soft, NT, ND, BS throughout, had just had BM in bed prior to call  Extremities: no swelling, deformities, or pitting edema  MSK:  Moves all extremities, but not to command  Neuro: altered, PERRL, not following commands      Assessment:   - Acute mental status change, possibly from flash pulmonary edema and afib. - Acute bradycardia could have been a vagal reaction after large BM during dialysis, but unclear.       Plan:    - will obtain CT head to r/o acute changes  - repeat lab check (K, Mg, Phos, Ca)  - obtain EKG, if afib, will start low dose diltiazem for rate control  - defer further plans for dialysis to nephrology      Signed,    Mara William MD PGY3  Internal Medicine  [phone]

## 2018-10-25 NOTE — CONSULTS
 MO INSJ TUNNELED CVC W/O SUBQ PORT/ AGE 5 YR/> Left 9/20/2018    TRIPLE LUMEN NUNO PLACEMENT UNDER FLUOROSCOPY performed by Claudette Zendejas MD at [address] [place] [address] CTR VAD W/SUBQ PORT/ CTR/PRPH INSJ Left 9/20/2018    PORT REMOVAL performed by Claudette Zendejas MD at [place] [address]      plasma button    TONSILLECTOMY         Family History:  family history includes Cancer in his mother; Diabetes in his child; Heart Disease in his father and mother; High Blood Pressure in his brother, father, and mother. Social History:  he reports that he has never smoked. He has never used smokeless tobacco. He reports that he drinks alcohol. He reports that he does not use drugs. Social History     Social History    Marital status:      Spouse name: N/A    Number of children: N/A    Years of education: N/A     Occupational History    Not on file. Social History Main Topics    Smoking status: Never Smoker    Smokeless tobacco: Never Used    Alcohol use 0.0 oz/week     2 - 3 Cans of beer per week      Comment: rare ETOH now    Drug use: No    Sexual activity: Not on file     Other Topics Concern    Not on file     Social History Narrative    No narrative on file       Medications: Allergies   Allergen Reactions    Tegaderm Ag Mesh 2\"X2\" [Wound Dressings] Rash    Voriconazole Other (See Comments)     Hallucinations    Keflex [Cephalexin]      Irregular heartbeat and blood in urine       Prescriptions Prior to Admission: ursodiol (CARYL) 250 MG tablet, Take 250 mg by mouth 4 times daily (before meals and nightly)  zolpidem (AMBIEN) 10 MG tablet, Take 10 mg by mouth nightly as needed. .  baclofen (LIORESAL) 10 MG tablet, Take 10 mg by mouth 3 times daily  acetaminophen (TYLENOL) 325 MG tablet, Take 650 mg by mouth every 6 hours as needed for Pain  propranolol (INDERAL) 80 MG tablet, Take 80 mg by mouth daily   amLODIPine (NORVASC) 5 MG tablet, Take 5 mg by mouth daily.  [DISCONTINUED] ibuprofen (ADVIL;MOTRIN) 200 MG tablet, Take 200 mg by mouth every 4-6 hours as needed for Pain    Review of Systems:  ROS:  Constitutional- No weight loss or fevers  Eyes- No diplopia. No photophobia. Ears/nose/throat- No dysphagia. No Dysarthria  Cardiovascular- No palpitations. No chest pain  Respiratory- No dyspnea. No Cough  Gastrointestinal- No Abdominal pain. No Vomiting. Genitourinary- No incontinence. No urinary retention  Musculoskeletal- No myalgia. No arthralgia  Skin- No rash. No easy bruising. Psychiatric- No depression. No anxiety  Endocrine- No diabetes. No thyroid issues. Hematologic- No bleeding difficulty. No fatigue  Neurologic- as per HPI. OBJECTIVE     Vitals:  Neurological Exam:  Exam:  Blood pressure 103/67, pulse 61, temperature 92.5 °F (33.6 °C), temperature source Core, resp. rate 16, height 5' 9\" (1.753 m), weight 272 lb 11.3 oz (123.7 kg), SpO2 100 %. Constitutional    Vital signs: BP, HR, and RR reviewed         He is intubated, sedated on propofol  Constitutional    Vital signs: BP, HR, and RR reviewed   General depressed mental status, no distress, well-nourished  Eyes: fundoscopic exam unable to assess  Cardiovascular: pulses symmetric in all 4 extremities. Mild peripheral edema. Neurologic  Mental status: limited exam given encephalopathy, sedation. No commands followed. CN2: Visual Fields: Blinks to threat. HOWIE. CN 3,4,6:  extraocular muscles intact with dolls maneuver  CN5: facial sensation: unable to assess  CN7:face grossly symmetric  CN9: limited exam given encephalopathy  CN11: limited exam given encephalopathy  CN12: limited exam given encephalopathy  Strength: Unable to assess. Deep tendon reflexes: normal in all 4 extremities  Sensory: Unable to assess  Cerebellar/coordination: Unable to assess  Tone: normal in all 4 extremities  Gait: limited exam given encephalopathy and intubated with ventilator.       Imaging:    MRI BRAIN WO 8.3 - 10.6 mg/dL    Phosphorus 6.4 (H) 2.5 - 4.9 mg/dL   APTT    Collection Time: 10/25/18  4:23 AM   Result Value Ref Range    aPTT 38.3 (H) 26.0 - 36.0 sec   Protime-INR    Collection Time: 10/25/18  4:23 AM   Result Value Ref Range    Protime 19.4 (H) 9.8 - 13.0 sec    INR 1.70 (H) 0.86 - 1.14   Fibrinogen    Collection Time: 10/25/18  4:23 AM   Result Value Ref Range    Fibrinogen 248 200 - 397 mg/dL   D-dimer, quantitative    Collection Time: 10/25/18  4:23 AM   Result Value Ref Range    D-Dimer, Quant 1188 (H) 0 - 229 ng/mL DDU   Basic metabolic panel    Collection Time: 10/25/18  4:23 AM   Result Value Ref Range    Sodium 137 136 - 145 mmol/L    Potassium 3.6 3.5 - 5.1 mmol/L    Chloride 98 (L) 99 - 110 mmol/L    CO2 21 21 - 32 mmol/L    Anion Gap 18 (H) 3 - 16    Glucose 298 (H) 70 - 99 mg/dL     (HH) 7 - 20 mg/dL    CREATININE 2.5 (H) 0.8 - 1.3 mg/dL    GFR Non-African American 25 (A) >60    GFR  31 (A) >60    Calcium 8.2 (L) 8.3 - 10.6 mg/dL   PREPARE PLATELETS (CROSSMATCH) Number of Doses Requested (1 dose = 4 to 6 units or 1 Single Donor): 1    Collection Time: 10/25/18  5:21 AM   Result Value Ref Range    Product Code Blood Bank [id number]     Description Blood Bank Platelets, Apheresis, Irradiated, Leuko-reduced     Unit Number [id number]     Dispense Status Blood Bank transfused    POCT Arterial    Collection Time: 10/25/18  6:01 AM   Result Value Ref Range    POC Sodium 138 136 - 145 mmol/L    POC Potassium 3.9 3.5 - 5.1 mmol/L    POC Glucose 282 (H) 70 - 99 mg/dl    Calcium, Ion 1.14 1.12 - 1.32 mmol/L    pH, Arterial 7.351 7.350 - 7.450    pCO2, Arterial 27.7 (L) 35.0 - 45.0 mm Hg    pO2, Arterial 75.7 75.0 - 108.0 mm Hg    HCO3, Arterial 15.4 (L) 21.0 - 29.0 mmol/L    Base Excess, Arterial -10 (L) -3 - 3    O2 Sat, Arterial 95 93 - 100 %    TCO2, Arterial 16 Not Established mmol/L    Lactate 7.47 (HH) 0.40 - 2.00 mmol/L    Sample Type ART     Performed on SEE BELOW    EKG 12 Lead    Collection Time: 10/25/18  8:19 AM   Result Value Ref Range    Ventricular Rate 109 BPM    Atrial Rate 136 BPM    QRS Duration 112 ms    Q-T Interval 366 ms    QTc Calculation (Bazett) 492 ms    R Axis 35 degrees    T Axis 52 degrees    Diagnosis       Atrial fibrillation with rapid ventricular responseSeptal infarct , age undeterminedAbnormal ECGConfirmed by [facility] TANI MCCORMICK, Tex Hunter ([id number]) on 10/25/2018 3:48:13 PM   Renal function panel    Collection Time: 10/25/18  9:42 AM   Result Value Ref Range    Sodium 137 136 - 145 mmol/L    Potassium 4.0 3.5 - 5.1 mmol/L    Chloride 98 (L) 99 - 110 mmol/L    CO2 18 (L) 21 - 32 mmol/L    Anion Gap 21 (H) 3 - 16    Glucose 204 (H) 70 - 99 mg/dL    BUN 70 (H) 7 - 20 mg/dL    CREATININE 1.8 (H) 0.8 - 1.3 mg/dL    GFR Non- 37 (A) >60    GFR  45 (A) >60    Calcium 7.7 (L) 8.3 - 10.6 mg/dL    Phosphorus 5.2 (H) 2.5 - 4.9 mg/dL    Alb 2.7 (L) 3.4 - 5.0 g/dL   Tacrolimus level (as a trough)    Collection Time: 10/25/18  9:42 AM   Result Value Ref Range    Tacrolimus Lvl 7.7 5.0 - 20.0 ng/mL   Lactic Acid, Plasma    Collection Time: 10/25/18  9:42 AM   Result Value Ref Range    Lactic Acid 7.4 (HH) 0.4 - 2.0 mmol/L   CBC Auto Differential    Collection Time: 10/25/18  9:42 AM   Result Value Ref Range    WBC 1.7 (L) 4.0 - 11.0 K/uL    RBC 2.72 (L) 4.20 - 5.90 M/uL    Hemoglobin 7.9 (L) 13.5 - 17.5 g/dL    Hematocrit 22.2 (L) 40.5 - 52.5 %    MCV 81.9 80.0 - 100.0 fL    MCH 29.0 26.0 - 34.0 pg    MCHC 35.4 31.0 - 36.0 g/dL    RDW 14.9 12.4 - 15.4 %    Platelets 34 (L) 408 - 450 K/uL    MPV 6.9 5.0 - 10.5 fL    Neutrophils % 85.0 %    Lymphocytes % 6.0 %    Monocytes % 9.0 %    Eosinophils % 0.0 %    Basophils % 0.0 %    Neutrophils # 1.4 (L) 1.7 - 7.7 K/uL    Lymphocytes # 0.1 (L) 1.0 - 5.1 K/uL    Monocytes # 0.2 0.0 - 1.3 K/uL    Eosinophils # 0.0 0.0 - 0.6 K/uL    Basophils # 0.0 0.0 - 0.2 K/uL    Anisocytosis 1+ (A)     Microcytes Occasional (A)

## 2018-10-25 NOTE — CONSULTS
Venous Bilateral   Final Result      XR ABDOMEN (KUB) (SINGLE AP VIEW)   Final Result       Feeding tube has been advanced, configuration consistent with post    pyloric placement in the proximal second portion of the duodenum. XR ABDOMEN (KUB) (SINGLE AP VIEW)   Final Result   1. Feeding tube identified with tip overlying the right upper quadrant,    approximately at the level of the pylorus. If postpyloric placement is    needed, recommend advancing by 5 cm. NM LUNG VENT/PERFUSION (VQ)   Final Result      Intermediate probability pattern for pulmonary embolus. Bilateral pleural effusions and lower lung atelectasis on CT matched with bilateral lower lung decrease perfusion and decreased ventilation. CT CHEST WO CONTRAST   Final Result   1. Progression of airspace disease in the lower lobes along with bilateral lower lobe consolidation and bilateral pleural effusions. 2. Fluid within the right proximal intermedius, evidence of aspiration or, pneumonia, inspissated mucus      XR CHEST PORTABLE   Final Result   1. Nonspecific bibasilar opacities may represent atelectasis or an    infectious or inflammatory process. 2.  Feeding tube tip projects in the proximal stomach. Consider    advancement and abdominal radiograph to confirm tube position, prior to    use. XR ABDOMEN (KUB) (SINGLE AP VIEW)   Final Result      Corpak just distal to the gastroesophageal junction. CT Head WO Contrast   Final Result      No acute intracranial abnormality. No significant interval change since 10/15/2018       IR FLUORO GUIDED CVA DEVICE PLACEMENT   Final Result   1. Satisfactory ultrasound and fluoroscopic-guided placement and positioning of right internal jugular triple-lumen 5 [place] central venous catheter. 2. Satisfactory position and placement of temporary right internal jugular Trialysis catheter.    3. Catheter tip of both are confirmed in satisfactory position at the 3:20 AM   Result Value Ref Range    Total Protein 4.2 (L) 6.4 - 8.2 g/dL    Alb 2.5 (L) 3.4 - 5.0 g/dL    Alkaline Phosphatase 137 (H) 40 - 129 U/L    ALT 18 10 - 40 U/L    AST 17 15 - 37 U/L    Total Bilirubin 1.8 (H) 0.0 - 1.0 mg/dL    Bilirubin, Direct 0.9 (H) 0.0 - 0.3 mg/dL    Bilirubin, Indirect 0.9 0.0 - 1.0 mg/dL   CBC auto differential    Collection Time: 10/25/18  3:20 AM   Result Value Ref Range    WBC 1.4 (L) 4.0 - 11.0 K/uL    RBC 2.89 (L) 4.20 - 5.90 M/uL    Hemoglobin 8.5 (L) 13.5 - 17.5 g/dL    Hematocrit 23.5 (L) 40.5 - 52.5 %    MCV 81.3 80.0 - 100.0 fL    MCH 29.3 26.0 - 34.0 pg    MCHC 36.0 31.0 - 36.0 g/dL    RDW 14.5 12.4 - 15.4 %    Platelets 7 (LL) 534 - 450 K/uL    MPV 8.9 5.0 - 10.5 fL    Neutrophils % 75.0 %    Lymphocytes % 9.0 %    Monocytes % 13.0 %    Eosinophils % 0.0 %    Basophils % 0.0 %    Neutrophils # 1.1 (L) 1.7 - 7.7 K/uL    Lymphocytes # 0.1 (L) 1.0 - 5.1 K/uL    Monocytes # 0.2 0.0 - 1.3 K/uL    Eosinophils # 0.0 0.0 - 0.6 K/uL    Basophils # 0.0 0.0 - 0.2 K/uL    Bands Relative 3 0 - 7 %    Toxic Granulation Present (A)     RBC Morphology Normal    Magnesium    Collection Time: 10/25/18  3:20 AM   Result Value Ref Range    Magnesium 1.90 1.80 - 2.40 mg/dL   Lactate Dehydrogenase    Collection Time: 10/25/18  3:20 AM   Result Value Ref Range     100 - 190 U/L   Renal function panel    Collection Time: 10/25/18  3:20 AM   Result Value Ref Range    Sodium 138 136 - 145 mmol/L    Potassium 3.7 3.5 - 5.1 mmol/L    Chloride 98 (L) 99 - 110 mmol/L    CO2 21 21 - 32 mmol/L    Anion Gap 19 (H) 3 - 16    Glucose 300 (H) 70 - 99 mg/dL     (HH) 7 - 20 mg/dL    CREATININE 2.4 (H) 0.8 - 1.3 mg/dL    GFR Non-African American 27 (A) >60    GFR  32 (A) >60    Calcium 8.2 (L) 8.3 - 10.6 mg/dL    Phosphorus 6.4 (H) 2.5 - 4.9 mg/dL   APTT    Collection Time: 10/25/18  4:23 AM   Result Value Ref Range    aPTT 38.3 (H) 26.0 - 36.0 sec   Protime-INR    Collection

## 2018-10-25 NOTE — PROGRESS NOTES
· Musculoskeletal: No tenderness. No edema    · Lymphadenopathy: Has no cervical adenopathy. · Neurological: Alert and oriented. Cranial nerve appears intact, No Gross deficit   · Skin: Skin is warm and dry. No rash noted. · Psychiatric: Has a normal mood, affect and behavior     Labs:  Reviewed. Recent Labs      10/23/18   1630  10/24/18   0415  10/25/18   0320  10/25/18   0423   NA  140  138  140  138  137   K  3.9  4.0  3.9  3.7  3.6   CL  101  100  101  98*  98*   CO2  27  24  25  21  21   PHOS  4.2  4.9  6.4*   --    BUN  50*  69*  60*  110*  110*   CREATININE  1.3  1.7*  1.7*  2.4*  2.5*     Recent Labs      10/23/18   0345  10/24/18   0415  10/25/18   0320   WBC  0.5*  0.7*  1.4*   HGB  7.2*  7.0*  8.5*   HCT  20.0*  19.7*  23.5*   MCV  81.7  81.7  81.3   PLT  18*  7*  7*     No results found for: CKTOTAL, CKMB, CKMBINDEX, TROPONINI  No results found for: BNP  Lab Results   Component Value Date    PROTIME 19.4 10/25/2018    PROTIME 18.7 10/22/2018    PROTIME 19.3 10/18/2018    INR 1.70 10/25/2018    INR 1.64 10/22/2018    INR 1.69 10/18/2018     Lab Results   Component Value Date    TRIG 170 10/15/2018       Telemetry: Personally reviewed: NSR, PACs, PVCs, ST, pause/2:1 HB, AF    Radiography: Personally reviewed: CXR  1. Moderate airspace disease/consolidation left lung base and possible pleural effusion    ECG: Personally reviewed:AF, , , QTc 492, ST elevation in II, aVF     ECHO:10/15/2018    Summary   Technically difficult and limited study due to patient unable to tolerate   laying/ sitting in bed and heavy breathing. Patient had to be scanned   sitting up in chair.   Normal left ventricle size.  There is mild concentric left ventricular   hypertrophy.   Overall left ventricular systolic function difficult to assess accurately   but appears to be preserved .   No diagnostic regional wall motion abnormalities grossly with definity   Diastolic dysfunction can not be determined.   Trivial tricuspid regurgitation.   Bi-atrial enlargement. 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    AML (acute myeloid leukemia) in remission ([facility] 09/20/2018    Port-A-Cath in place     Acute myeloid leukemia not having achieved remission ([facility]     Cervical radiculopathy at C7 10/14/2014    Cervical stenosis of spine 10/09/2014    Cervical spondylosis 10/09/2014    Cervical radiculopathy 09/27/2014    Morbid obesity with BMI of 40.0-44.9, adult ([facility] 06/09/2014    Primary localized osteoarthrosis, lower leg 06/02/2014    Tear of lateral cartilage or meniscus of knee, current 06/02/2014        Assessment:   1. AML  2. Resp failure  3. Bradycardia  4. Atrial fib    Cardiac Hx: Homar Sheth is a 67 y.o. 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 Patient had a BM while in dialysis, became bradycardic, never lost a pulse, became unresponsive and dialysis was stopped. Patient had a code called earlier this am for resp failure that resumed after AMBU bag. Currently in the process of being intubated. Bradycardia  - In the setting of BM during dialysis - ? vagal reaction  - Tele shows high degree AV block during this episode  - ST elevation in inferior in II, aVF  - Keep on tele  - ECG ordered and results personally reviewed     AF  - Appears to be in AF  - LA 4.4, vol 89  - Keep K+ > 4.0, Mg . 2.0  - Platelet 7, not a candidate for oral anticoagulation    Dr. Taylor Cano to see. EF appears to be normal  No known systolic HF  ASA and Statin for CAD  Anticoagulation for AF and heart failure  No tobacco use. All questions and concerns were addressed to the patient/family. Alternatives to my treatment were discussed. The note was completed using EMR.  Every effort was made to ensure

## 2018-10-25 NOTE — PROGRESS NOTES
negative     Physical Exam:    I&O:      Intake/Output Summary (Last 24 hours) at 10/25/18 0642  Last data filed at 10/24/18 1900   Gross per 24 hour   Intake           2896.5 ml   Output              350 ml   Net           2546.5 ml       Vital Signs:  /67   Pulse 93   Temp 96 °F (35.6 °C)   Resp 21   Ht 5' 9\" (1.753 m)   Wt 275 lb 9.2 oz (125 kg)   SpO2 93%   BMI 40.70 kg/m²     Weight:    Wt Readings from Last 3 Encounters:   10/25/18 275 lb 9.2 oz (125 kg)   08/14/18 266 lb 12.1 oz (121 kg)   10/09/14 294 lb (133.4 kg)       General: alert, ill appearing, delirius    HEENT: normocephalic, alopecia, PERRL, no scleral erythema or icterus, Oral mucosa w/ improving mucositis  NECK: supple without palpable adenopathy  BACK: Straight  SKIN: erythematous, sand paper like rash on face and ant superior chest.   CHEST: Rhonchi throughout  CV: Normal S1 S2, RRR, no MRG  ABD: NT, ND, no rebound or guarding, normoactive BS, no palpable masses or hepatosplenomegaly  M/S:  no erythema   EXTREMITIES: 2+ BLE edema, denies calf tenderness  NEURO: CN II - XII grossly intact  :  Pain and erythema of penis and scrotum, perianal areas of skin breakdown   CATHETER: Left TLH (Barr, 9/20/18) - erythema and excoriation from previous bandage    Data:   CBC:   Recent Labs      10/23/18   0345  10/24/18   0415  10/25/18   0320   WBC  0.5*  0.7*  1.4*   HGB  7.2*  7.0*  8.5*   HCT  20.0*  19.7*  23.5*   MCV  81.7  81.7  81.3   PLT  18*  7*  7*     BMP/Mag:  Recent Labs      10/23/18   0345  10/23/18   1630  10/24/18   0415  10/25/18   0320  10/25/18   0423   NA  142  140  138  140  138  137   K  4.0  3.9  4.0  3.9  3.7  3.6   CL  102  101  100  101  98*  98*   CO2  26  27  24  25  21  21   PHOS  4.9  4.2  4.9  6.4*   --    BUN  65*  50*  69*  60*  110*  110*   CREATININE  1.5*  1.3  1.7*  1.7*  2.4*  2.5*   MG  1.80   --   1.80  1.90   --      LIVP:   Recent Labs      10/23/18   0345  10/24/18   0415  10/25/18   0320 Cont IV meds when possible  - Replaced corpack - begin semi elemental TF (10/23/18) @ goal of 85 mL/hr    -Current  mL Q 4 hours  - Consult WOCN, clean area frequently  -SLP:  NPO, severe pharyngeal dysphagia     9.  Cardiac:  Cont to have SOB when laying flat  - ECHO (10/15/18): Limited exam but appears to be preserved systolic function, no pericardial effusion  - S/p Norvasc 5 mg daily (stopped 10/14/18)  - Cont Propanolol 20 mg daily (decreased 10/14/18)     - EKG with A fib - ask cardiology to see     10. Left Great Ingrown Toenail Removal:  No acute issues   - Dr. Ricardo Solo to follow as needed     11. Insomnia:  Chronic  - Avoid Ambien and Ativan d/t AMS     12.  Neuro:  Acute encephalopathy, multifactorial - drug induced vs metabolic encephalopathy vs infection (VRE (bacteremia). -CT head (10/25/18)- No acute findings    - CT Head (10/19/18) - No acute findings   - Elevated LFTs, but ammonia level WNL  - Azotemia and hypernatremia - improved, renal following   - Unable to obtain MRI brain (10/19/18) d/t confusion/agitation/inability to lay still or flate; will revisit in light of events    13. :  Penis and scrotal pain, possibly fungal infection. Improved  - Cont Miconazole powder      14. Hyperglycemia:  Exacerbated by tube feedings  -Continue regular insulin, medium sliding scale with accuchecks Q 6 hours  -Start Lantus 10 units SubQ nightly (10/25)    - DVT Prophylaxis: Platelets <78,092 cells/dL - prophylactic lovenox on hold and mechanical prophylaxis with bilateral SCDs while in bed in place. Contraindications to pharmacologic prophylaxis: Thrombocytopenia  Contraindications to mechanical prophylaxis: None    - Disposition:  Home once blood counts recover and toxicities resolved following allogeneic transplant      KEL Robison - JOSE Mcclain.  Channing Bond DO, MS

## 2018-10-26 NOTE — PROGRESS NOTES
Electrophysiology - PROGRESS NOTE    Admit Date: 9/20/2018     Chief Complaint: bradycardia, AF     Interval History:   Patient seen and examined and notes reviewed. Patient is being followed for bradycardia that happened while patient was in dialysis 0n 10/25/18, 2 hours later had resp failure that resolved with assisted venilation. Patient now intubated. CT abd showed bilar pl eff, R>L, small amt ascites, mild LLQ edema/inflammation. CT of the head showed 2 cerebellar ischemic infarcts. No AF seen overnight and patient currently in ventricular bigeminy. Echo showed a normal EF and grade I diastolic dysfunction. Currently on CRRT.      In: 258.9 [I.V.:192.9]  Out: 1193    Wt Readings from Last 2 Encounters:   10/25/18 272 lb 11.3 oz (123.7 kg)   08/14/18 266 lb 12.1 oz (121 kg)         Data:   Scheduled Meds:   Scheduled Meds:   tobramycin  180 mg Intravenous Once    potassium phosphate IVPB  6 mmol Intravenous Once    Or    potassium phosphate IVPB  12 mmol Intravenous Once    Or    potassium phosphate IVPB  18 mmol Intravenous Once    Or    potassium phosphate IVPB  24 mmol Intravenous Once    piperacillin-tazobactam  3.375 g Intravenous Q6H    micafungin (MYCAMINE) IVPB  50 mg Intravenous Daily    tacrolimus  0.5 mg Oral Nightly    pantoprazole sodium  40 mg Per NG tube QAM AC    tacrolimus  1 mg Oral Daily    acyclovir  350 mg Intravenous Q24H    linezolid  600 mg Intravenous Q12H    tbo-filgrastim  300 mcg Subcutaneous QPM    VENELEX   Topical BID    miconazole   Topical BID    Saline Mouthwash  15 mL Swish & Spit 4x Daily AC & HS    sodium chloride flush  10 mL Intravenous 2 times per day    ursodiol  250 mg Oral 4x Daily AC & HS     Continuous Infusions:   prismaSATE BGK 4/2.5 2,500 mL/hr (10/26/18 0954)    prismaSATE BGK 4/2.5 500 mL/hr (10/25/18 2037)    prismaSATE BGK 4/2.5 500 mL/hr (10/26/18 0751)    propofol 15 mcg/kg/min (10/26/18 0957)    norepinephrine 5 mcg/min (10/26/18 1041)    dextrose      sodium chloride Stopped (10/22/18 2345)    sodium chloride 20 mL/hr at 10/19/18 1632     PRN Meds:.potassium chloride, magnesium sulfate, calcium gluconate IVPB **OR** calcium gluconate IVPB **OR** calcium gluconate IVPB **OR** calcium gluconate IVPB, iohexol, haloperidol lactate, albumin human, sodium chloride, heparin (porcine), haloperidol lactate, levalbuterol, glucose, dextrose, glucagon (rDNA), dextrose, medicated lip ointment, magic (miracle) mouthwash, acetaminophen, cetirizine, [COMPLETED] loperamide **FOLLOWED BY** loperamide, sodium chloride, simethicone, aluminum & magnesium hydroxide-simethicone, ondansetron, ondansetron, bisacodyl, sodium chloride, alteplase, magnesium hydroxide, Saline Mouthwash, prochlorperazine **OR** prochlorperazine  Continuous Infusions:   prismaSATE BGK 4/2.5 2,500 mL/hr (10/26/18 0954)    prismaSATE BGK 4/2.5 500 mL/hr (10/25/18 2037)    prismaSATE BGK 4/2.5 500 mL/hr (10/26/18 0751)    propofol 15 mcg/kg/min (10/26/18 0957)    norepinephrine 5 mcg/min (10/26/18 1041)    dextrose      sodium chloride Stopped (10/22/18 2345)    sodium chloride 20 mL/hr at 10/19/18 1632       Intake/Output Summary (Last 24 hours) at 10/26/18 1056  Last data filed at 10/26/18 1000   Gross per 24 hour   Intake            258.9 ml   Output             1193 ml   Net           -934.1 ml       CBC:   Lab Results   Component Value Date    WBC 1.6 10/26/2018    HGB 7.7 10/26/2018    PLT 11 10/26/2018     BMP:  Lab Results   Component Value Date     10/26/2018     10/26/2018    K 4.9 10/26/2018    K 4.8 10/26/2018    K 3.3 10/25/2018     10/26/2018     10/26/2018    CO2 20 10/26/2018    CO2 20 10/26/2018    BUN 51 10/26/2018    BUN 56 10/26/2018    CREATININE 1.2 10/26/2018    CREATININE 1.2 10/26/2018    GLUCOSE 186 10/26/2018    GLUCOSE 187 10/26/2018     INR:   Lab Results   Component Value

## 2018-10-26 NOTE — PROGRESS NOTES
Went to change patient, pt became super agiaited and then became unresponsive called rapid. Sternal rubbed patient with no response,  stopped breathing at this time, but had a pulse. Pt came to on his own. STAT EKG. Troponin and lactic was drawn. EKG showed afib with RVR.

## 2018-10-26 NOTE — PROGRESS NOTES
tactile stimulation. Cerebellar/coordination:  limited examination due to encephalopathy. Tone: generalized flaccidity. Gait: Deferred at this time due to safety concerns. Labs:  Hepatic:   Recent Labs      10/25/18   0320  10/25/18   2130  10/26/18   0410   AST  17  16  16   ALT  18  15  16   BILITOT  1.8*  2.0*  2.7*   ALKPHOS  137*  116  121       INR:   Recent Labs      10/25/18   0423   INR  1.70*   Results for Latisha Ornelas (MRN [id number]) as of 10/26/2018 12:02   Ref. Range 10/26/2018 04:10   Sodium Latest Ref Range: 136 - 145 mmol/L 140   Potassium Latest Ref Range: 3.5 - 5.1 mmol/L 4.9   Chloride Latest Ref Range: 99 - 110 mmol/L 101   CO2 Latest Ref Range: 21 - 32 mmol/L 20 (L)   BUN Latest Ref Range: 7 - 20 mg/dL 51 (H)   Creatinine Latest Ref Range: 0.8 - 1.3 mg/dL 1.2   Anion Gap Latest Ref Range: 3 - 16  19 (H)   GFR Non- Latest Ref Range: >60  59 (A)   GFR  Latest Ref Range: >60  >60   Glucose Latest Ref Range: 70 - 99 mg/dL 186 (H)   Calcium Latest Ref Range: 8.3 - 10.6 mg/dL 7.1 (L)   Phosphorus Latest Ref Range: 2.5 - 4.9 mg/dL 4.4      Ref. Range 10/26/2018 04:10   WBC Latest Ref Range: 4.0 - 11.0 K/uL 1.6 (L)   RBC Latest Ref Range: 4.20 - 5.90 M/uL 2.64 (L)   Hemoglobin Quant Latest Ref Range: 13.5 - 17.5 g/dL 7.7 (L)   Hematocrit Latest Ref Range: 40.5 - 52.5 % 21.2 (L)   MCV Latest Ref Range: 80.0 - 100.0 fL 80.2   MCH Latest Ref Range: 26.0 - 34.0 pg 29.2   MCHC Latest Ref Range: 31.0 - 36.0 g/dL 36.5 (H)   MPV Latest Ref Range: 5.0 - 10.5 fL 7.1   RDW Latest Ref Range: 12.4 - 15.4 % 15.0   Platelet Count Latest Ref Range: 135 - 450 K/uL 11 (LL)       Studies:    MRI of the head:10/26/18        ECHO:10/25/2018  Normal /left ventricle size.  There is mild concentric left ventricular   hypertrophy.   Overall left ventricular systolic function appears normal with an ejection   fraction visually estimated at 55%.   No regional wall motion abnormalities

## 2018-10-26 NOTE — PROGRESS NOTES
tomorrow. Discussed with Dr Idalia Bernal   10/10/18 d+13 0.9 1.3 1 mg po bid 7 With dose increased yesterday and level up, will continue present dose. Next level Friday. 10/12/18; d+15 1.1 1.5 1 mg po bid 7.7 No change; levels MWF   10/15/18  D+18 1.4 2.5 1 mg po bid 15.7 Level supra therapeutic with increase in renal and hepatic function. AM dose  held pending level. Will resume prograf at 0.5 mg po bid this evening with daily levels. 10/16/18  D+19 1.6 2.2 0.5 mg po bid 13.5 Will continue current dosing with daily levels. Discussed with Dr Lidia Yan    10/17/18  D+20 1.8 2.3 0.5 mg po bid 13.8 No change   10/18/18; d+21 1.7 on Hemodialysis 2.2 0.5 mg po bid 12.8 No change;   (noted: patient did not receive 10/17/18 pm dose due to patient confusion and agitation)   10/19/15; d+22 1.5 on hemodialysis 2.7 0.5mg po bid (suspension as of 10/18 pm dose) 10.4 Discussed with - will continue current dosing of Prograf as suspension formulation that started with 10/18 2100 dose; continue daily levels   10/21/18; d+24 1.7 on hemodialysis 2.5 0.5mg po bid suspension 6.4 Discussed with  - will continue same Prograf dosing for today and will check level in am   10/22/18  D+25 1.7 on HD 2.7 0.5 mg po suspension bid 5 Will give extra 0.5 mg po now and change to 1 mg am and 0.5 mg pm. Discussed with Dr Lidia Yan   10/23/18 d+26 1.5 on HD 2.6 1 mg am and 0.5 mg PM susp 9.1 No change    10/24/18 d+27 1.7 on HD 2.4 1 mg am and 0.5 mg pm susp 6.7 No change   10/25/15;d+28 2.5 changed to CVVHD 1.8 1mg qam and 0.5mg qpm  (suspension 7.7 No change   10/26/18 d+29     Level not drawn                                                               Please call with questions. Reginald Laureano, Pharm. D.  [phone] [place] [address] Clinical Pharmacist    10/26/2018 4:19 PM

## 2018-10-26 NOTE — PLAN OF CARE
well.  Continued to encourage daily CHG bathing per [facility] protocol. Problem: Discharge Planning:  Goal: Discharged to appropriate level of care  Discharged to appropriate level of care   Outcome: Ongoing  Family at bedside and is aware of plan of care. Will continue to monitor. Problem: Venous Thromboembolism:  Goal: Will show no signs or symptoms of venous thromboembolism  Will show no signs or symptoms of venous thromboembolism   Outcome: Ongoing  Adherent with DVT Prevention: Pt is at risk for DVT d/t decreased mobility and cancer treatment. Pt educated on importance of activity. Pt has orders for SCDs while in bed. Pt verbalizes understanding of need for prophylaxis while inpatient. Problem: Skin Integrity:  Goal: Absence of new skin breakdown  Absence of new skin breakdown   Outcome: Ongoing  Pt being repositioned Q2 Hours. No evidence of new skin breakdown at this time. Zinc paste and Venelox cream being applied to buttocks. Will continue to monitor. Problem: Gas Exchange - Impaired:  Goal: Levels of oxygenation will improve  Levels of oxygenation will improve   Outcome: Ongoing  Pt remains on the ventilator at this time. Will continue to monitor. Problem: Restraint Use - Nonviolent/Non-Self-Destructive Behavior:  Goal: Absence of restraint indications  Absence of restraint indications   Outcome: Not Met This Shift  Pt remains in bilateral wrist restraints at this time. See restraint documentation.

## 2018-10-26 NOTE — PROGRESS NOTES
chloride, simethicone, aluminum & magnesium hydroxide-simethicone, ondansetron, ondansetron, bisacodyl, sodium chloride, alteplase, magnesium hydroxide, Saline Mouthwash, prochlorperazine **OR** prochlorperazine    ROS:  See above, remainder of 10 point ROS is negative     Physical Exam:    I&O:      Intake/Output Summary (Last 24 hours) at 10/26/18 0855  Last data filed at 10/26/18 0800   Gross per 24 hour   Intake            114.9 ml   Output             1071 ml   Net           -956.1 ml       Vital Signs:  /61   Pulse 61   Temp (!) 91.9 °F (33.3 °C) (Core)   Resp 16   Ht 5' 9\" (1.753 m)   Wt 272 lb 11.3 oz (123.7 kg)   SpO2 100%   BMI 40.27 kg/m²     Weight:    Wt Readings from Last 3 Encounters:   10/25/18 272 lb 11.3 oz (123.7 kg)   08/14/18 266 lb 12.1 oz (121 kg)   10/09/14 294 lb (133.4 kg)       General: alert, ill appearing, delirius    HEENT: normocephalic, alopecia, PERRL, no scleral erythema or icterus, Oral mucosa w/ improving mucositis  NECK: supple without palpable adenopathy  BACK: Straight  SKIN: erythematous, sand paper like rash on face and ant superior chest.   CHEST: Rhonchi throughout  CV: Normal S1 S2, RRR, no MRG  ABD: NT, ND, no rebound or guarding, normoactive BS, no palpable masses or hepatosplenomegaly  M/S:  no erythema   EXTREMITIES: 2+ BLE edema, denies calf tenderness  NEURO: CN II - XII grossly intact  :  Pain and erythema of penis and scrotum, perianal areas of skin breakdown   CATHETER: Left TLH (Barrat, 9/20/18) - erythema and excoriation from previous bandage    Data:   CBC:   Recent Labs      10/25/18   0320  10/25/18   0942  10/26/18   0410   WBC  1.4*  1.7*  1.6*   HGB  8.5*  7.9*  7.7*   HCT  23.5*  22.2*  21.2*   MCV  81.3  81.9  80.2   PLT  7*  34*  11*     BMP/Mag:  Recent Labs      10/25/18   0942  10/25/18   2130  10/26/18   0410   NA  137  138  140  139   K  4.0  3.3*  4.9  4.8   CL  98*  104  101  100   CO2  18*  16*  20*  20*   PHOS  5.2*  5.4* Donor / Recipient: Negative / Negative      Day + 29     2. ID:  Profound hypothermia and bradycardia w/ ongoing septic shock. 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 Small amount of ascites. Small amount of perihepatic, perisplenic and pelvic fluid. Mild left lower quadrant edema/inflammation. This may be slightly less than seen previously on the study of October 2 which demonstrated mild diverticulitis. - S/p Bronch/Diatherix (10/25/18) - Pending   - Sepsis:  Cont Levophed, titrate   - Cont IV Acyclovir & micafungin prophylaxis  - Linezolid (started 10/16/18) Day + 11  - Zosyn Day + 2   - Tobramycin Day + 1      Abx History:  Merrem Day + 21 (stopped 10/25/18)  Zosyn Day + 4 (stopped 10/5/18)  IV Vanco x 6 days (d/c 10/09/18)  Vanco x 5 days (d/c 10/16)  Priscilla 150 mg IV x 13 days     3. Heme:  Chemotherapy induced pancytopenia, WBC recovering  - H/o hereditary hemochromatosis - needs post BMT phlebotomy   - Hives w/ platelets; pre-medicate w/ benadryl prior (holding d/t significant sedation, will monitor closely)   - PRA (10/16/18) - negative  - Haptoglobin WNL, LDH (slightly elevated, now WNL), VILMA negative (10/16/18)  - [id number] (10/18/18) - 16, ? TTP/HUS   - Transfuse for Hgb < 7 and Platelets < 17U (intubation)  - Cont G-CSF daily (started 10/15/18)  - Plt transfusion today    4.   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 Follow closely, but has been stable   - S/p MTX per ProTmume protocol: 15 mg/m2 on day + 1;  MTX 10 mg/m2 on day + 3, 6. (did not receive day + 11 d/t mucositis)  - Cont Prograf 1 mg Q am and 0.5 mg Q PM w/ daily level      Tacro Level:  Lab Results   Component Value Date    TACROLEV 7.7 10/25/2018    TACROLEV 6.7 10/24/2018    TACROLEV 9.1 10/23/2018     6. VOD:  No evidence of VOD, but T.bili elevated but stable. - ABD US w/ dopplers (10/5/18) - Negative w/ normal flow    - Repeat Abd US w dopplers (10/15/18) - limited exam but negative with normal flow    Recent Labs      10/26/18   0410   BILIDIR  1.8*   BILITOT  2.7*     Admission Weight: 265 lb (120.2 kg)  Current Weight: Weight: 272 lb 11.3 oz (123.7 kg). - Cont Actigall. 7. Pulmonary:  Acute onset hypoxemic respiratory failure (10/21/18 & 10/25/18), concern for mucus plugging and/or fluid overload   - PFTs prior to Tx:  decreased FEV1  - Elevated D-Dimer > 2000, 1188 (10/25/18)   - VQ scan (10/22/18) - Intermediate probability pattern for pulmonary embolus. Bilateral pleural effusions and lower lung atelectasis on CT matched with bilateral lower lung decrease perfusion and decreased ventilation. CT Chest (10/22/18):  Progression of airspace disease in the lower lobes along with bilateral lower lobe consolidation and bilateral pleural effusions.   Fluid within the right proximal intermedius, evidence of aspiration or, pneumonia, inspissated mucus  - BLE Doppler (10/22/18) - Negative  - Xopenex 0.63 mg Q 6 hours prn  - Possible PE but risks for treatment outweigh benefits d/t severe thrombocytopenia  - Pulm/Critical Care following, appreciate recs  - Intubated (10/25/18)   Sedation:  Propofol     8.  GI /

## 2018-10-26 NOTE — PLAN OF CARE
Problem: Falls - Risk of:  Goal: Will remain free from falls  Will remain free from falls   Outcome: Met This Shift  Patient intubated & sedated. Resting in bed with side rails x 3. Bed in lowest position. Door to room open. Bed alert on, fall precautions in place. Viviana camera continued. Remains free from falls and injury throughout shift. Problem: Pain:  Goal: Pain level will decrease  Pain level will decrease   Outcome: Ongoing  Pt intubated and sedated, showing no s/s of pain. Problem: Bleeding:  Goal: Will show no signs and symptoms of excessive bleeding  Will show no signs and symptoms of excessive bleeding   Outcome: Ongoing  Patient's hemoglobin this AM:   Recent Labs      10/26/18   0410   HGB  7.7*     Patient's platelet count this AM:   Recent Labs      10/26/18   0410   PLT  11*    Thrombocytopenia not present at this time. Patient showing no signs or symptoms of active bleeding. Pt to be transfused once product is available. Patient verbalizes understanding of all instructions. Will continue to assess and implement POC. Call light within reach and hourly rounding in place. Problem: Infection - Central Venous Catheter-Associated Bloodstream Infection:  Goal: Will show no infection signs and symptoms  Will show no infection signs and symptoms   Outcome: Met This Shift  CVC site remains free of signs/symptoms of infection. No drainage, edema, erythema, pain, or warmth noted at site. Dressing changes continue per protocol and on an as needed basis - see flowsheet. Compliant with [facility] Bath Protocol:  Performed CHG bath today per [facility] protocol utilizing Bed bath with CHG wipes. CVC site cleansed with CHG wipe over dressing, skin surrounding dressing, and first 6\" of IV tubing. Pt tolerated well. Continued to encourage daily CHG bathing per [facility] protocol.     Problem: Serum Glucose Level - Abnormal:  Goal: Ability to maintain appropriate glucose levels has stabilized  Ability to maintain

## 2018-10-26 NOTE — PROGRESS NOTES
Pt was intubated and 50 of propofol given. Pt tolerated procedure well. Size 8 ett tube 23 at the lip. OG was placed 66 at the lip. KUB was ordered. Will continue to monitor,.

## 2018-10-26 NOTE — PROGRESS NOTES
10/25/18   0320  10/25/18   0942  10/26/18   0410   WBC  1.4*  1.7*  1.6*   HGB  8.5*  7.9*  7.7*   HCT  23.5*  22.2*  21.2*   PLT  7*  34*  11*       BMP:   Recent Labs      10/25/18   0942  10/25/18   2130  10/26/18   0410   NA  137  138  140  139   K  4.0  3.3*  4.9  4.8   CL  98*  104  101  100   CO2  18*  16*  20*  20*   BUN  70*  68*  51*  56*   CREATININE  1.8*  1.6*  1.2  1.2   GLUCOSE  204*  199*  186*  187*     LFT's:   Recent Labs      10/25/18   0320  10/25/18   2130  10/26/18   0410   AST  17  16  16   ALT  18  15  16   BILITOT  1.8*  2.0*  2.7*   ALKPHOS  137*  116  121     Troponin:   Recent Labs      10/25/18   0942  10/25/18   2130   TROPONINI  0.05*  0.03*     BNP: No results for input(s): BNP in the last 72 hours. ABGs:   Recent Labs      10/25/18   0601  10/25/18   1534   PHART  7.351  7.337*   [id number]  27.7*  32.8*   PO2ART  75.7  161.6*     INR:   Recent Labs      10/25/18   0423   INR  1.70*     Lipids: No results for input(s): CHOL, HDL in the last 72 hours. Invalid input(s): LDLCALCU    U/A:  No results for input(s): NITRITE, COLORU, PHUR, LABCAST, WBCUA, RBCUA, MUCUS, TRICHOMONAS, YEAST, BACTERIA, CLARITYU, SPECGRAV, LEUKOCYTESUR, UROBILINOGEN, BILIRUBINUR, BLOODU, GLUCOSEU, AMORPHOUS in the last 72 hours. Invalid input(s): KETONESU     ASSESSMENT AND PLAN:   Patient is a 66 y.o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 Patient was found to have positive blood cultures for enterococcus DNA and was started on Meropenem.      DEVON: Etiology appears to be ATN. Baseline BUN/Cr 28/0.9. Pattricia Holstein could also have other factorials such as the patient receiving Tacrolimus, and Acyclovir both know to possibly cause nephrotoxicity.   -Strict I/O  -Renal panel daily      Anemia- Hb has remained labile-  Haptoglobin level 46.  Patient received pRBC on 10/21/18  Management per

## 2018-10-26 NOTE — PLAN OF CARE
monitor. Problem: Diarrhea:  Goal: Bowel elimination is within specified parameters  Bowel elimination is within specified parameters   Outcome: Ongoing  Pt with two large episodes of diarrhea. Will continue to monitor,     Problem: Restraint Use - Nonviolent/Non-Self-Destructive Behavior:  Goal: Absence of restraint indications  Absence of restraint indications   Outcome: Ongoing  Pt remains in bilateral soft wrist restraints due to poor safety awareness and pulling at lines/tubes. Visual check every hour, and restraint need re-evaluated every two hours per hospital policy. See restraint flowsheet. Goal is for patient to remain free of harm/injury.  Will continue to monitor

## 2018-10-27 NOTE — SIGNIFICANT EVENT
Failed attempt at arterial line placement by Dr. Zbigniew Dietrich and myself. Lengthy discussion with patient's wife Octaviano Gregg and sons regarding change in status over the past several hours, particularly the increase in lactate and shock liver. Over the course of the night, Mr. Jamie Betancourt has required an increased in pressor support to max dose of phenylephrine, levophed, and vasopressin. Discussed starting epinephrine drip but do not believe it will provide much change in outcome. Propofol unfortunately had to be stopped due to decreasing blood pressures and patient is now exhibiting paradoxical breathing. [place] stat labs to trend shock liver and lactate, per wife, if those values and white count are the factors in adjusting treatment towards comfort rather than aggressive medical management. Addendum 4:22am: Patient's wife, Octaviano Gregg, does not want to pursue full resuscitative measures should the need arise. Will adjust code status from full code to [facility] for now. Addendum 4:54am: Discussed new labs with patient's family and patient's wife Octaviano Gregg and sheree in agreement that they would to pursue comfort care at this point. They requested to restart propofol, the patient remain on propofol and proceed with comfort extubation as the last step. Pushed 20mg propofol and started propofol gtt at 50mcg/kg/min for now until patient appears to be comfortable. Will titrate from that point onward with morphine IV. Will titrate off pressors once patient appears comfortable and then finally extubation.     Darinel Contreras MD, PGY3  [phone]  10/27/18  4:07 AM

## 2018-10-28 LAB
ASPERGILLUS GALACTO AG: NEGATIVE
ASPERGILLUS GALACTO INDEX: 0.03
ASPERGILLUS GALACTO INDEX: 0.09
ASPERGILLUS GALACTO INDEX: 0.14

## 2018-10-29 LAB
FINAL REPORT: NORMAL
FINAL REPORT: NORMAL
PRELIMINARY: NORMAL
PRELIMINARY: NORMAL

## 2018-10-30 LAB
BLOOD CULTURE, ROUTINE: NORMAL
CULTURE, BLOOD 2: NORMAL
EKG ATRIAL RATE: 64 BPM
EKG DIAGNOSIS: NORMAL
EKG P AXIS: 42 DEGREES
EKG P-R INTERVAL: 318 MS
EKG Q-T INTERVAL: 408 MS
EKG QRS DURATION: 100 MS
EKG QTC CALCULATION (BAZETT): 420 MS
EKG R AXIS: -7 DEGREES
EKG T AXIS: 36 DEGREES
EKG VENTRICULAR RATE: 64 BPM

## 2018-10-31 LAB — CULTURE, FUNGUS BLOOD: NORMAL

## 2018-11-01 LAB — MISCELLANEOUS LAB TEST ORDER: ABNORMAL

## 2018-11-02 LAB
FINAL REPORT: NORMAL
FINAL REPORT: NORMAL
PRELIMINARY: NORMAL
PRELIMINARY: NORMAL

## 2018-11-03 LAB
FINAL REPORT: NORMAL
FINAL REPORT: NORMAL
PRELIMINARY: NORMAL
PRELIMINARY: NORMAL

## 2018-11-05 LAB
CULTURE, FUNGUS BLOOD: NORMAL
CULTURE, FUNGUS BLOOD: NORMAL
FUNGUS (MYCOLOGY) CULTURE: NORMAL
FUNGUS (MYCOLOGY) CULTURE: NORMAL
FUNGUS STAIN: NORMAL
FUNGUS STAIN: NORMAL

## 2018-11-07 LAB
FINAL REPORT: NORMAL
FINAL REPORT: NORMAL
Lab: NORMAL
Lab: NORMAL
PRELIMINARY: NORMAL
PRELIMINARY: NORMAL
REPORT: NORMAL
REPORT: NORMAL
THIS TEST SENT TO: NORMAL
THIS TEST SENT TO: NORMAL

## 2018-11-19 LAB
CULTURE, FUNGUS BLOOD: NORMAL
CULTURE, FUNGUS BLOOD: NORMAL

## 2018-12-11 LAB
AFB CULTURE (MYCOBACTERIA): NORMAL
AFB CULTURE (MYCOBACTERIA): NORMAL
AFB SMEAR: NORMAL
AFB SMEAR: NORMAL

## 2019-05-24 LAB — REPORT: NORMAL

## 2019-08-30 LAB
Lab: NORMAL
REPORT: NORMAL
THIS TEST SENT TO: NORMAL

## (undated) DEVICE — 3M™ TEGADERM™ TRANSPARENT FILM DRESSING FRAME STYLE, 1626W, 4 IN X 4-3/4 IN (10 CM X 12 CM), 50/CT 4CT/CASE: Brand: 3M™ TEGADERM™

## (undated) DEVICE — GOWN,SIRUS,POLYRNF,BRTHSLV,LG,30/CS: Brand: MEDLINE

## (undated) DEVICE — SYRINGE MED 10ML TRNSLUC BRL PLUNG BLK MRK POLYPR CTRL

## (undated) DEVICE — GAUZE,SPONGE,4"X4",16PLY,XRAY,STRL,LF: Brand: MEDLINE

## (undated) DEVICE — CAP IV VLV LUER ACCS DISINFECT SWABPACK XT

## (undated) DEVICE — SURGICAL SET UP - SURE SET: Brand: MEDLINE INDUSTRIES, INC.

## (undated) DEVICE — SPONGE GZ W4XL8IN COT WVN 12 PLY

## (undated) DEVICE — SYRINGE, LUER LOCK, 10ML: Brand: MEDLINE

## (undated) DEVICE — PACK,BASIC: Brand: MEDLINE

## (undated) DEVICE — GOWN,SIRUS,POLYRNF,SETINSLV,XL,20/CS: Brand: MEDLINE

## (undated) DEVICE — GARMENT,MEDLINE,DVT,INT,CALF,MED, GEN2: Brand: MEDLINE

## (undated) DEVICE — ELECTRODE PT RET AD L9FT HI MOIST COND ADH HYDRGEL CORDED

## (undated) DEVICE — SURE SET-DOUBLE BASIN-LF: Brand: MEDLINE INDUSTRIES, INC.

## (undated) DEVICE — TURNOVER KIT RM INF CTRL TECH

## (undated) DEVICE — CHLORAPREP 26ML ORANGE

## (undated) DEVICE — SUTURE VCRL SZ 3-0 L18IN ABSRB UD L26MM SH 1/2 CIR J864D

## (undated) DEVICE — SUTURE PROL SZ 2-0 L36IN NONABSORBABLE BLU SH L26MM 1/2 CIR 8523H

## (undated) DEVICE — SYRINGE MED 10ML POLYPR LUERSLIP TIP FLAT TOP W/O SFTY DISP

## (undated) DEVICE — SKIN AFFIX SURG ADHESIVE 72/CS 0.55ML: Brand: MEDLINE

## (undated) DEVICE — SHEET,T,THYROID,STERILE: Brand: MEDLINE

## (undated) DEVICE — Z DISCONTINUED USE 2749457 TUBING SAMP AD W12.5XH8.4IN D9.1IN NSL ORAL SMRT CAPNOLINE

## (undated) DEVICE — CUFF RESTRN WRST OR ANK 45FT AD FOAM

## (undated) DEVICE — INTENDED FOR TISSUE SEPARATION, AND OTHER PROCEDURES THAT REQUIRE A SHARP SURGICAL BLADE TO PUNCTURE OR CUT.: Brand: BARD-PARKER ® CARBON RIB-BACK BLADES

## (undated) DEVICE — DRAPE C ARM UNIV W41XL74IN CLR PLAS XR VELC CLSR POLY STRP

## (undated) DEVICE — PENCIL ES L3M ROCK SWCH S STL HEX LOK BLDE ELECTRD HOLSTER

## (undated) DEVICE — DRESSING GERM DIA1IN CNTR H DIA7MM BLU CHG ANTIMIC PROTCT

## (undated) DEVICE — GOWN,SIRUS,POLYRNF,SETINSLV,L,20/CS: Brand: MEDLINE

## (undated) DEVICE — COVER LT HNDL BLU PLAS

## (undated) DEVICE — STANDARD HYPODERMIC NEEDLE,POLYPROPYLENE HUB: Brand: MONOJECT

## (undated) DEVICE — SUTURE MCRYL SZ 4-0 L27IN ABSRB UD L19MM PS-2 1/2 CIR PRIM Y426H

## (undated) DEVICE — GLOVE SURG SZ 7 L12IN FNGR THK87MIL WHT LTX FREE

## (undated) DEVICE — DRAPE,T,LAPARO,TRANS,STERILE: Brand: MEDLINE

## (undated) DEVICE — CONNECTOR IV PRIMING 0.19ML 0 REFLX NO FLTR MAX0

## (undated) DEVICE — TRAP SPEC COLL 40CC MUCOUS CALIB UNIV CONN FOR OPN SUCT